# Patient Record
Sex: MALE | Race: WHITE | NOT HISPANIC OR LATINO | Employment: OTHER | URBAN - METROPOLITAN AREA
[De-identification: names, ages, dates, MRNs, and addresses within clinical notes are randomized per-mention and may not be internally consistent; named-entity substitution may affect disease eponyms.]

---

## 2023-04-10 PROBLEM — E78.5 HYPERLIPIDEMIA: Status: ACTIVE | Noted: 2023-04-10

## 2023-04-10 PROBLEM — E11.9 DIABETES MELLITUS (MULTI): Status: ACTIVE | Noted: 2023-04-10

## 2023-04-10 PROBLEM — R35.0 FREQUENCY OF URINATION: Status: ACTIVE | Noted: 2023-04-10

## 2023-04-10 PROBLEM — E55.9 VITAMIN D DEFICIENCY: Status: ACTIVE | Noted: 2023-04-10

## 2023-04-10 PROBLEM — I10 ESSENTIAL HYPERTENSION: Status: ACTIVE | Noted: 2023-04-10

## 2023-04-10 PROBLEM — I35.2 NONRHEUMATIC AORTIC INSUFFICIENCY WITH AORTIC STENOSIS: Status: ACTIVE | Noted: 2023-04-10

## 2023-04-10 PROBLEM — R80.9 PERSISTENT PROTEINURIA ASSOCIATED WITH TYPE 2 DIABETES MELLITUS (MULTI): Status: ACTIVE | Noted: 2023-04-10

## 2023-04-10 PROBLEM — N40.0 ENLARGED PROSTATE WITHOUT LOWER URINARY TRACT SYMPTOMS (LUTS): Status: ACTIVE | Noted: 2023-04-10

## 2023-04-10 PROBLEM — K21.9 GASTROESOPHAGEAL REFLUX DISEASE: Status: ACTIVE | Noted: 2023-04-10

## 2023-04-10 PROBLEM — I25.10 ATHEROSCLEROTIC CARDIOVASCULAR DISEASE: Status: ACTIVE | Noted: 2023-04-10

## 2023-04-10 PROBLEM — I48.4 ATYPICAL ATRIAL FLUTTER (MULTI): Status: ACTIVE | Noted: 2023-04-10

## 2023-04-10 PROBLEM — E11.29 PERSISTENT PROTEINURIA ASSOCIATED WITH TYPE 2 DIABETES MELLITUS (MULTI): Status: ACTIVE | Noted: 2023-04-10

## 2023-04-10 PROBLEM — R53.83 FATIGUE: Status: ACTIVE | Noted: 2023-04-10

## 2023-04-10 RX ORDER — METOPROLOL SUCCINATE 50 MG/1
1 TABLET, EXTENDED RELEASE ORAL DAILY
COMMUNITY
Start: 2013-05-20 | End: 2023-04-12 | Stop reason: SDUPTHER

## 2023-04-10 RX ORDER — GLIMEPIRIDE 4 MG/1
2 TABLET ORAL 2 TIMES DAILY
COMMUNITY
Start: 2017-06-27 | End: 2023-04-12 | Stop reason: SDUPTHER

## 2023-04-10 RX ORDER — METFORMIN HYDROCHLORIDE 500 MG/1
500 TABLET ORAL
COMMUNITY
Start: 2016-04-05 | End: 2023-04-12 | Stop reason: SDUPTHER

## 2023-04-10 RX ORDER — ACETAMINOPHEN 500 MG
1 TABLET ORAL DAILY
COMMUNITY
Start: 2014-12-15

## 2023-04-10 RX ORDER — SEMAGLUTIDE 1.34 MG/ML
0.5 INJECTION, SOLUTION SUBCUTANEOUS
COMMUNITY
Start: 2021-09-01 | End: 2023-04-12 | Stop reason: SDUPTHER

## 2023-04-10 RX ORDER — PANTOPRAZOLE SODIUM 40 MG/1
1 TABLET, DELAYED RELEASE ORAL DAILY
COMMUNITY
Start: 2019-04-01 | End: 2023-04-12 | Stop reason: SDUPTHER

## 2023-04-10 RX ORDER — LOSARTAN POTASSIUM 100 MG/1
1 TABLET ORAL DAILY
COMMUNITY
Start: 2013-11-26 | End: 2023-04-12 | Stop reason: SDUPTHER

## 2023-04-10 RX ORDER — ATORVASTATIN CALCIUM 80 MG/1
1 TABLET, FILM COATED ORAL DAILY
COMMUNITY
Start: 2013-05-20 | End: 2023-04-12 | Stop reason: SDUPTHER

## 2023-04-10 RX ORDER — DILTIAZEM HYDROCHLORIDE 300 MG/1
1 CAPSULE, EXTENDED RELEASE ORAL DAILY
COMMUNITY
Start: 2013-11-26 | End: 2023-04-12 | Stop reason: SDUPTHER

## 2023-04-10 RX ORDER — BLOOD SUGAR DIAGNOSTIC
STRIP MISCELLANEOUS
COMMUNITY
Start: 2019-01-28

## 2023-04-12 ENCOUNTER — OFFICE VISIT (OUTPATIENT)
Dept: PRIMARY CARE | Facility: CLINIC | Age: 73
End: 2023-04-12
Payer: COMMERCIAL

## 2023-04-12 ENCOUNTER — LAB (OUTPATIENT)
Dept: LAB | Facility: LAB | Age: 73
End: 2023-04-12
Payer: COMMERCIAL

## 2023-04-12 VITALS
BODY MASS INDEX: 35.1 KG/M2 | TEMPERATURE: 97.2 F | HEIGHT: 69 IN | SYSTOLIC BLOOD PRESSURE: 110 MMHG | DIASTOLIC BLOOD PRESSURE: 80 MMHG | WEIGHT: 237 LBS

## 2023-04-12 DIAGNOSIS — E11.9 TYPE 2 DIABETES MELLITUS WITHOUT COMPLICATION, WITHOUT LONG-TERM CURRENT USE OF INSULIN (MULTI): ICD-10-CM

## 2023-04-12 DIAGNOSIS — I10 ESSENTIAL HYPERTENSION: ICD-10-CM

## 2023-04-12 DIAGNOSIS — E78.2 MIXED HYPERLIPIDEMIA: ICD-10-CM

## 2023-04-12 DIAGNOSIS — I25.10 ATHEROSCLEROTIC CARDIOVASCULAR DISEASE: ICD-10-CM

## 2023-04-12 DIAGNOSIS — N40.0 ENLARGED PROSTATE WITHOUT LOWER URINARY TRACT SYMPTOMS (LUTS): ICD-10-CM

## 2023-04-12 DIAGNOSIS — I48.4 ATYPICAL ATRIAL FLUTTER (MULTI): ICD-10-CM

## 2023-04-12 DIAGNOSIS — E55.9 VITAMIN D DEFICIENCY: ICD-10-CM

## 2023-04-12 DIAGNOSIS — K21.9 GASTROESOPHAGEAL REFLUX DISEASE WITHOUT ESOPHAGITIS: ICD-10-CM

## 2023-04-12 DIAGNOSIS — I35.2 NONRHEUMATIC AORTIC INSUFFICIENCY WITH AORTIC STENOSIS: ICD-10-CM

## 2023-04-12 DIAGNOSIS — N28.9 RENAL INSUFFICIENCY: ICD-10-CM

## 2023-04-12 DIAGNOSIS — E11.29 PERSISTENT PROTEINURIA ASSOCIATED WITH TYPE 2 DIABETES MELLITUS (MULTI): ICD-10-CM

## 2023-04-12 DIAGNOSIS — R80.9 PERSISTENT PROTEINURIA ASSOCIATED WITH TYPE 2 DIABETES MELLITUS (MULTI): ICD-10-CM

## 2023-04-12 DIAGNOSIS — Z00.00 VISIT FOR PREVENTIVE HEALTH EXAMINATION: ICD-10-CM

## 2023-04-12 DIAGNOSIS — Z00.00 MEDICARE ANNUAL WELLNESS VISIT, SUBSEQUENT: ICD-10-CM

## 2023-04-12 DIAGNOSIS — R35.0 FREQUENCY OF URINATION: ICD-10-CM

## 2023-04-12 DIAGNOSIS — I25.10 ATHEROSCLEROTIC CARDIOVASCULAR DISEASE: Primary | ICD-10-CM

## 2023-04-12 LAB
ALANINE AMINOTRANSFERASE (SGPT) (U/L) IN SER/PLAS: 16 U/L (ref 10–52)
ALBUMIN (G/DL) IN SER/PLAS: 4.2 G/DL (ref 3.4–5)
ALKALINE PHOSPHATASE (U/L) IN SER/PLAS: 102 U/L (ref 33–136)
ANION GAP IN SER/PLAS: 15 MMOL/L (ref 10–20)
ASPARTATE AMINOTRANSFERASE (SGOT) (U/L) IN SER/PLAS: 16 U/L (ref 9–39)
BASOPHILS (10*3/UL) IN BLOOD BY AUTOMATED COUNT: 0.03 X10E9/L (ref 0–0.1)
BASOPHILS/100 LEUKOCYTES IN BLOOD BY AUTOMATED COUNT: 0.3 % (ref 0–2)
BILIRUBIN TOTAL (MG/DL) IN SER/PLAS: 1.1 MG/DL (ref 0–1.2)
CALCIDIOL (25 OH VITAMIN D3) (NG/ML) IN SER/PLAS: 26 NG/ML
CALCIUM (MG/DL) IN SER/PLAS: 9.2 MG/DL (ref 8.6–10.3)
CARBON DIOXIDE, TOTAL (MMOL/L) IN SER/PLAS: 24 MMOL/L (ref 21–32)
CHLORIDE (MMOL/L) IN SER/PLAS: 105 MMOL/L (ref 98–107)
CREATININE (MG/DL) IN SER/PLAS: 1.15 MG/DL (ref 0.5–1.3)
EOSINOPHILS (10*3/UL) IN BLOOD BY AUTOMATED COUNT: 0.1 X10E9/L (ref 0–0.4)
EOSINOPHILS/100 LEUKOCYTES IN BLOOD BY AUTOMATED COUNT: 1.1 % (ref 0–6)
ERYTHROCYTE DISTRIBUTION WIDTH (RATIO) BY AUTOMATED COUNT: 13.3 % (ref 11.5–14.5)
ERYTHROCYTE MEAN CORPUSCULAR HEMOGLOBIN CONCENTRATION (G/DL) BY AUTOMATED: 32.4 G/DL (ref 32–36)
ERYTHROCYTE MEAN CORPUSCULAR VOLUME (FL) BY AUTOMATED COUNT: 92 FL (ref 80–100)
ERYTHROCYTES (10*6/UL) IN BLOOD BY AUTOMATED COUNT: 4.99 X10E12/L (ref 4.5–5.9)
GFR MALE: 67 ML/MIN/1.73M2
GLUCOSE (MG/DL) IN SER/PLAS: 125 MG/DL (ref 74–99)
HEMATOCRIT (%) IN BLOOD BY AUTOMATED COUNT: 45.7 % (ref 41–52)
HEMOGLOBIN (G/DL) IN BLOOD: 14.8 G/DL (ref 13.5–17.5)
IMMATURE GRANULOCYTES/100 LEUKOCYTES IN BLOOD BY AUTOMATED COUNT: 0.2 % (ref 0–0.9)
LEUKOCYTES (10*3/UL) IN BLOOD BY AUTOMATED COUNT: 9.3 X10E9/L (ref 4.4–11.3)
LYMPHOCYTES (10*3/UL) IN BLOOD BY AUTOMATED COUNT: 3.32 X10E9/L (ref 0.8–3)
LYMPHOCYTES/100 LEUKOCYTES IN BLOOD BY AUTOMATED COUNT: 35.5 % (ref 13–44)
MONOCYTES (10*3/UL) IN BLOOD BY AUTOMATED COUNT: 0.87 X10E9/L (ref 0.05–0.8)
MONOCYTES/100 LEUKOCYTES IN BLOOD BY AUTOMATED COUNT: 9.3 % (ref 2–10)
NEUTROPHILS (10*3/UL) IN BLOOD BY AUTOMATED COUNT: 5 X10E9/L (ref 1.6–5.5)
NEUTROPHILS/100 LEUKOCYTES IN BLOOD BY AUTOMATED COUNT: 53.6 % (ref 40–80)
PLATELETS (10*3/UL) IN BLOOD AUTOMATED COUNT: 262 X10E9/L (ref 150–450)
POC ALBUMIN /CREATININE RATIO MANUALLY ENTERED: ABNORMAL UG/MG CREAT
POC HDL CHOLESTEROL: 36 MG/DL (ref 0–40)
POC HEMOGLOBIN A1C: 6.1 % (ref 4.2–6.5)
POC LDL CHOLESTEROL: 79 MG/DL (ref 0–100)
POC NON-HDL CHOLESTEROL: 116 MG/DL (ref 0–130)
POC TOTAL CHOLESTEROL/HDL RATIO: 4.2 (ref 0–4.5)
POC TOTAL CHOLESTEROL: 153 MG/DL (ref 0–199)
POC TRIGLYCERIDES: 188 MG/DL (ref 0–150)
POC URINE ALBUMIN: 150 MG/L
POC URINE CREATININE: 300 MG/DL
POTASSIUM (MMOL/L) IN SER/PLAS: 4.9 MMOL/L (ref 3.5–5.3)
PROSTATE SPECIFIC AG (NG/ML) IN SER/PLAS: 1.34 NG/ML (ref 0–4)
PROTEIN TOTAL: 6.7 G/DL (ref 6.4–8.2)
SEDIMENTATION RATE, ERYTHROCYTE: 15 MM/H (ref 0–20)
SODIUM (MMOL/L) IN SER/PLAS: 139 MMOL/L (ref 136–145)
THYROTROPIN (MIU/L) IN SER/PLAS BY DETECTION LIMIT <= 0.05 MIU/L: 2.23 MIU/L (ref 0.44–3.98)
URATE (MG/DL) IN SER/PLAS: 5 MG/DL (ref 4–7.5)
UREA NITROGEN (MG/DL) IN SER/PLAS: 21 MG/DL (ref 6–23)

## 2023-04-12 PROCEDURE — 82044 UR ALBUMIN SEMIQUANTITATIVE: CPT | Performed by: FAMILY MEDICINE

## 2023-04-12 PROCEDURE — 85025 COMPLETE CBC W/AUTO DIFF WBC: CPT

## 2023-04-12 PROCEDURE — 4010F ACE/ARB THERAPY RXD/TAKEN: CPT | Performed by: FAMILY MEDICINE

## 2023-04-12 PROCEDURE — 99214 OFFICE O/P EST MOD 30 MIN: CPT | Performed by: FAMILY MEDICINE

## 2023-04-12 PROCEDURE — 1036F TOBACCO NON-USER: CPT | Performed by: FAMILY MEDICINE

## 2023-04-12 PROCEDURE — 1159F MED LIST DOCD IN RCRD: CPT | Performed by: FAMILY MEDICINE

## 2023-04-12 PROCEDURE — 83036 HEMOGLOBIN GLYCOSYLATED A1C: CPT | Performed by: FAMILY MEDICINE

## 2023-04-12 PROCEDURE — 84550 ASSAY OF BLOOD/URIC ACID: CPT

## 2023-04-12 PROCEDURE — 36416 COLLJ CAPILLARY BLOOD SPEC: CPT | Performed by: FAMILY MEDICINE

## 2023-04-12 PROCEDURE — 36415 COLL VENOUS BLD VENIPUNCTURE: CPT

## 2023-04-12 PROCEDURE — 1160F RVW MEDS BY RX/DR IN RCRD: CPT | Performed by: FAMILY MEDICINE

## 2023-04-12 PROCEDURE — 3079F DIAST BP 80-89 MM HG: CPT | Performed by: FAMILY MEDICINE

## 2023-04-12 PROCEDURE — 1170F FXNL STATUS ASSESSED: CPT | Performed by: FAMILY MEDICINE

## 2023-04-12 PROCEDURE — 84153 ASSAY OF PSA TOTAL: CPT

## 2023-04-12 PROCEDURE — 99397 PER PM REEVAL EST PAT 65+ YR: CPT | Performed by: FAMILY MEDICINE

## 2023-04-12 PROCEDURE — 80053 COMPREHEN METABOLIC PANEL: CPT

## 2023-04-12 PROCEDURE — 80061 LIPID PANEL: CPT | Performed by: FAMILY MEDICINE

## 2023-04-12 PROCEDURE — G0439 PPPS, SUBSEQ VISIT: HCPCS | Performed by: FAMILY MEDICINE

## 2023-04-12 PROCEDURE — 84443 ASSAY THYROID STIM HORMONE: CPT

## 2023-04-12 PROCEDURE — 3074F SYST BP LT 130 MM HG: CPT | Performed by: FAMILY MEDICINE

## 2023-04-12 PROCEDURE — 82306 VITAMIN D 25 HYDROXY: CPT

## 2023-04-12 PROCEDURE — 85652 RBC SED RATE AUTOMATED: CPT

## 2023-04-12 RX ORDER — METOPROLOL SUCCINATE 50 MG/1
50 TABLET, EXTENDED RELEASE ORAL DAILY
Qty: 90 TABLET | Refills: 3 | Status: SHIPPED | OUTPATIENT
Start: 2023-04-12 | End: 2024-02-21 | Stop reason: SDUPTHER

## 2023-04-12 RX ORDER — METFORMIN HYDROCHLORIDE 500 MG/1
500 TABLET ORAL
Qty: 180 TABLET | Refills: 3 | Status: SHIPPED | OUTPATIENT
Start: 2023-04-12 | End: 2024-04-16 | Stop reason: SDUPTHER

## 2023-04-12 RX ORDER — GLIMEPIRIDE 2 MG/1
2 TABLET ORAL
Qty: 180 TABLET | Refills: 3 | Status: SHIPPED | OUTPATIENT
Start: 2023-04-12 | End: 2024-04-16 | Stop reason: SDUPTHER

## 2023-04-12 RX ORDER — PANTOPRAZOLE SODIUM 40 MG/1
40 TABLET, DELAYED RELEASE ORAL DAILY
Qty: 90 TABLET | Refills: 3 | Status: SHIPPED | OUTPATIENT
Start: 2023-04-12 | End: 2024-04-16 | Stop reason: SDUPTHER

## 2023-04-12 RX ORDER — LOSARTAN POTASSIUM 100 MG/1
100 TABLET ORAL DAILY
Qty: 90 TABLET | Refills: 3 | Status: SHIPPED | OUTPATIENT
Start: 2023-04-12 | End: 2024-04-16 | Stop reason: SDUPTHER

## 2023-04-12 RX ORDER — ATORVASTATIN CALCIUM 80 MG/1
80 TABLET, FILM COATED ORAL DAILY
Qty: 90 TABLET | Refills: 3 | Status: SHIPPED | OUTPATIENT
Start: 2023-04-12 | End: 2024-04-16 | Stop reason: SDUPTHER

## 2023-04-12 RX ORDER — DILTIAZEM HYDROCHLORIDE 300 MG/1
300 CAPSULE, EXTENDED RELEASE ORAL DAILY
Qty: 90 CAPSULE | Refills: 3 | Status: SHIPPED | OUTPATIENT
Start: 2023-04-12 | End: 2023-07-17

## 2023-04-12 RX ORDER — SEMAGLUTIDE 1.34 MG/ML
0.5 INJECTION, SOLUTION SUBCUTANEOUS
Qty: 1 EACH | Refills: 5 | Status: SHIPPED | OUTPATIENT
Start: 2023-04-12 | End: 2023-05-17 | Stop reason: ALTCHOICE

## 2023-04-12 ASSESSMENT — ENCOUNTER SYMPTOMS
TREMORS: 0
VOICE CHANGE: 0
DIAPHORESIS: 0
ADENOPATHY: 0
ANAL BLEEDING: 0
PHOTOPHOBIA: 0
SORE THROAT: 0
SHORTNESS OF BREATH: 0
PALPITATIONS: 0
NAUSEA: 0
ABDOMINAL PAIN: 0
TROUBLE SWALLOWING: 0
SLEEP DISTURBANCE: 0
COUGH: 0
NUMBNESS: 0
SPEECH DIFFICULTY: 0
NECK PAIN: 0
WHEEZING: 0
CHEST TIGHTNESS: 0
SEIZURES: 0
FREQUENCY: 0
VOMITING: 0
BACK PAIN: 0
BRUISES/BLEEDS EASILY: 0
CONSTIPATION: 0
UNEXPECTED WEIGHT CHANGE: 0
HEMATURIA: 0
ARTHRALGIAS: 0
NECK STIFFNESS: 0
BLOOD IN STOOL: 0
HEADACHES: 0
FATIGUE: 0
NERVOUS/ANXIOUS: 0
AGITATION: 0
DECREASED CONCENTRATION: 0
ABDOMINAL DISTENTION: 0
CONFUSION: 0
DIARRHEA: 0
FEVER: 0
CHILLS: 0
EYE ITCHING: 0
WEAKNESS: 0
EYE REDNESS: 0
DIZZINESS: 0
APPETITE CHANGE: 0
SINUS PRESSURE: 0
EYE PAIN: 0
EYE DISCHARGE: 0

## 2023-04-12 ASSESSMENT — PATIENT HEALTH QUESTIONNAIRE - PHQ9
1. LITTLE INTEREST OR PLEASURE IN DOING THINGS: NOT AT ALL
SUM OF ALL RESPONSES TO PHQ9 QUESTIONS 1 AND 2: 0
2. FEELING DOWN, DEPRESSED OR HOPELESS: NOT AT ALL
2. FEELING DOWN, DEPRESSED OR HOPELESS: NOT AT ALL
SUM OF ALL RESPONSES TO PHQ9 QUESTIONS 1 AND 2: 0
1. LITTLE INTEREST OR PLEASURE IN DOING THINGS: NOT AT ALL

## 2023-04-12 ASSESSMENT — ACTIVITIES OF DAILY LIVING (ADL)
BATHING: INDEPENDENT
DOING_HOUSEWORK: INDEPENDENT
MANAGING_FINANCES: INDEPENDENT
DRESSING: INDEPENDENT
GROCERY_SHOPPING: INDEPENDENT
TAKING_MEDICATION: INDEPENDENT

## 2023-04-12 NOTE — PROGRESS NOTES
Subjective   Reason for Visit: Franky Sauceda is an 72 y.o. male here for a Medicare Wellness visit.  He is also here for a general physical examination and to make sure he is updated on the of his health maintenance.              HPI  #1 Health Maintenance:  DTaP 8/15/2013  Prevnar 13 completed March 2017  Colonoscopy 10/12/2016 tubular adenoma ×5 years Dr. Rosa piper till 2021 6/17/2019 (glucose 156, creatinine 1.32, GFR 54) plus CBC plus PSA 1.19+ TSH 1.80  March 9, 2022 CBC plus PSA 1.04+ TSH 2.31+ CMP (creatinine 1.1 GFR 71) plus hemoglobin A1c 6.1%    #2 Diabetes:       September 1, 2021  Hemoglobin A1c 8.3%  Triglycerides 241  Albumin 150 creatinine 100 AC ratio greater than 30 abnormal  Blood pressure 119/80 weight 260 pounds BMI 39.80    December 8, 2021  Hemoglobin A1c 6.8%  Triglycerides 196  Microalbumin September 1, 2021  Blood pressure 120/80 weight 227 pounds BMI 37.87    March 14, 2022  Hemoglobin A1c 6.1%  Triglycerides 196  244 pounds down from 255 pounds in January 2020  BMI 37.20    July 7, 2022  Hemoglobin A1c 6.3%  Triglycerides 170  Weight 241 pounds  Urine microalbumin albumin 150 creatinine 100 AC ratio abnormal  BMI 36.84        April 12, 2023       Hemoglobin A1c 6.1%         Lipids total 153 HDL 36 ratio 4.2 LDL 79 triglycerides 188       Urine microalbumin pending       Weight 237 pounds BMI 35.10        #3 Hyperlipidemia:  February 23, 2021 total 218 HDL 37 ratio 5.9  triglycerides 292  September 1, 2021 total 183 HDL 31 ratio 6  triglycerides 241  December 8, 2021 total 171 HDL 32 ratio 5.3  triglycerides 196  March 14, 2022 total 160 HDL 36 ratio 4.4 LDL 84 triglycerides 196  July 7, 2022 total 151 HDL 30 ratio 5 LDL 86 triglycerides 170  April 12, 2023 total 153 HDL 36 ratio 4.2 LDL 79 triglycerides 188    #4 Renal Dysfunction:  His been leaking protein in his urine the last several times he has been checked. His last creatinine value was back in 2012  (9/4/2012 with a value of 1.09) on that same day his total protein was 569 mg per 24 hours. His creatinine clearance was calculated as 157 mL/m  On 3/10/2014 his 24 hour urine had improved to 325 mg per 24 hours (down from 569 milligrams)  6/15/2015 recent creatinine 12/15/2014 value 1.21  6/6/2016 value 1.10  Urine microalbumin 150 remains abnormal  3/27/2018 creatinine as of 9/27/2017 value 1.33 with a GFR of 53  May 28, 2021 creatinine 1.41 GFR 49 BUN 27  December 8, 2021 patient is being scheduled to recheck his renal function in 3 months.  March 10, 2022 creatinine 1.10 GFR 71    #5 BMI:  February 23, 2021 BMI 40.17, June 2, 2021 BMI 39.71  BMI is elevated the patient was notified as to its significance both in terms of general health status, but also specifically as it relates to other serious medical problems including hypertension, coronary artery disease, diabetes, GERD, and sleep apnea. I have recommended the patient start following a diet plan to lose weight which includes calorie restriction, portion regulation, and regular exercise.  September 1, 2021 BMI 39.86 reiterated above  July 7, 2022 weight 241 pounds BMI 36.84  April 12, 2023 blood pressure 110/80 weight 237 pounds BMI 35.10    April 12, 2023  This 72-year-old male is here today for his Medicare wellness exam.  He is also here for a general physical examination and to review all of his medications and make sure he is up-to-date with his health screening.  His hemoglobin A1c is gone up slightly to 6.1%.  He was down to 5.6%.  He has been taking Ozempic 0.5 mg weekly.  He has been able during this time to reduce his glimepiride from 4 mg twice a day down to 2 mg twice a day.  This may be why his A1c did go up slightly.  Most of his labs were done approximately 1 year ago so I am getting a slip for him to get these repeated.    July 7, 2022  71-year-old male is here today to follow-up on his diabetes and hyperlipidemia. He is still leaking protein  in urine amount of 150 mg/L with a creatinine level of 100 yielding an AC ratio of  mg/g. As renal function test however it did improve with his creatinine coming down to 1.1 with a GFR of 71.  Overall I am very pleased with his progress. He does continue to lose weight. He does experience a slight amount of nausea as he approaches the 0.5 mg strength of Ozempic. His A1c did go up slightly from 6.0-6.3 but his weight did continue to go down and because of the nausea I am keeping the strength the same and encouraging him to continue the progress he has made increasing exercise and decreasing calorie intake.  We also spent time going over in detail the results of his echocardiogram showing a moderate amount of aortic stenosis (23 mm mean gradient) with some thickening of the valve cusps. He also has some thickening of his mitral valve but very little in the way of insufficiency. Heart size is normal and his left atrium and left ventricle are both normal size.  I also told him if he was comfortable with it, he could reduce the dose of his furosemide from 40 down to 20 mg. Ultimately we will also try to reduce his other oral hypoglycemics. I do want to keep a close eye on his renal function so when he comes back in 3 months I am going to have him repeat his basic metabolic panel to ensure his metformin and losartan are not having an adverse effect on renal function.    September 1, 2021  This 70-year-old male is here today for his normal 6-month follow-up. His hemoglobin A1c is slightly higher going from 8.2% up to 8.3%. His creatinine is also somewhat higher at 1.41 with a GFR of 49. His PSA was 1.05, triglycerides 246, TSH 2.24, CBC (WBC 10, hemoglobin 14.4 g hematocrit 44.4%.  I am starting him on Ozempic 0.5 mg/week.    8/20/18 (Dr Parry)  This is a 68-year-old male who comes in today for complaint of left knee pain. Patient didn't really have any issues with his knee until about a week ago when he was  bowling a lot and he twisted his knee. It immediate sharp stabbing pain in the medial side of the knee. It very difficult for him to walk. Initially he did take some aspirin. He's been told not to take Aleve because of his blood pressure. Never had injections surgery or therapy on the knee. Has not had any bracing. A little bit better since then but still hurts him a lot, he is using crutches. No numbness or tingling or fevers or chills no shooting pain down the leg.  Impression plan: 68-year-old male with left knee arthritis acute exacerbation from injury.I had an in depth discussion with the patient regarding treatment options for arthritis and their relative risks and benefits. Treatments include anti inflammatory medications, physical therapy, weight loss, activity modification, use of assistive devices, injection therapies. We discussed that arthritis is often progressive over time, an in end stage arthritis surgical interventions can be considered, including arthroplasty. All questions were answered and the patient voiced their understanding. We'll get him started with a cortisone injection, he will discuss with his family doctor medications are okay for him to take. We'll start him on physical therapy I'll see him back in 3 months or when necessary.     Patient Care Team:  Mc Lee MD as PCP - General     Review of Systems   Constitutional:  Negative for appetite change, chills, diaphoresis, fatigue, fever and unexpected weight change.   HENT:  Negative for congestion, ear discharge, ear pain, hearing loss, nosebleeds, sinus pressure, sore throat, tinnitus, trouble swallowing and voice change.    Eyes:  Negative for photophobia, pain, discharge, redness, itching and visual disturbance.   Respiratory:  Negative for cough, chest tightness, shortness of breath and wheezing.    Cardiovascular:  Negative for chest pain, palpitations and leg swelling.   Gastrointestinal:  Negative for abdominal distention,  abdominal pain, anal bleeding, blood in stool, constipation, diarrhea, nausea and vomiting.   Endocrine: Negative for polyuria.   Genitourinary:  Negative for frequency and hematuria.   Musculoskeletal:  Negative for arthralgias, back pain, neck pain and neck stiffness.   Skin:  Negative for rash.   Allergic/Immunologic: Negative for environmental allergies and food allergies.   Neurological:  Negative for dizziness, tremors, seizures, syncope, speech difficulty, weakness, numbness and headaches.   Hematological:  Negative for adenopathy. Does not bruise/bleed easily.   Psychiatric/Behavioral:  Negative for agitation, behavioral problems, confusion, decreased concentration, sleep disturbance and suicidal ideas. The patient is not nervous/anxious.        Objective   Vitals:  There were no vitals taken for this visit.      Physical Exam  Constitutional:       Appearance: Normal appearance.   HENT:      Head: Normocephalic.      Right Ear: External ear normal.      Left Ear: External ear normal.      Nose: Nose normal.      Mouth/Throat:      Mouth: Mucous membranes are moist.      Pharynx: Oropharynx is clear. No oropharyngeal exudate or posterior oropharyngeal erythema.   Eyes:      Extraocular Movements: Extraocular movements intact.      Pupils: Pupils are equal, round, and reactive to light.   Cardiovascular:      Rate and Rhythm: Normal rate and regular rhythm.      Heart sounds: Normal heart sounds.   Pulmonary:      Effort: Pulmonary effort is normal.   Musculoskeletal:         General: Swelling present. Normal range of motion.      Cervical back: Normal range of motion.      Comments: He has significant knee pain which has prevented him from being as active aerobically.  I recommended that he try to find some type of nonweightbearing aerobic exercise.   Skin:     General: Skin is warm.      Capillary Refill: Capillary refill takes less than 2 seconds.   Neurological:      General: No focal deficit present.       Mental Status: He is alert and oriented to person, place, and time. Mental status is at baseline.      Sensory: No sensory deficit.      Motor: No weakness.      Gait: Gait normal.   Psychiatric:         Mood and Affect: Mood normal.         Behavior: Behavior normal.         Thought Content: Thought content normal.         Judgment: Judgment normal.       Assessment/Plan   Problem List Items Addressed This Visit    None

## 2023-04-19 ENCOUNTER — TELEPHONE (OUTPATIENT)
Dept: PRIMARY CARE | Facility: CLINIC | Age: 73
End: 2023-04-19
Payer: COMMERCIAL

## 2023-04-19 NOTE — TELEPHONE ENCOUNTER
----- Message from Mc Lee MD sent at 4/13/2023  8:09 AM EDT -----  Please let the patient know his level is low he should reduce the amount of vitamin D that he is taking up to a maximum of 5000 units daily.  He should have another vitamin D level checked in about 3 months.  ----- Message -----  From: Lab, Background User  Sent: 4/12/2023   5:18 PM EDT  To: Mc Lee MD    Left message for pt to call

## 2023-04-21 DIAGNOSIS — E55.9 VITAMIN D DEFICIENCY: ICD-10-CM

## 2023-04-21 NOTE — TELEPHONE ENCOUNTER
----- Message from Mc Lee MD sent at 4/13/2023  8:09 AM EDT -----  Please let the patient know his level is low he should reduce the amount of vitamin D that he is taking up to a maximum of 5000 units daily.  He should have another vitamin D level checked in about 3 months.  ----- Message -----  From: Lab, Background User  Sent: 4/12/2023   5:18 PM EDT  To: Mc Lee MD    PT VERBALIZED UNDERSTANDS RESULT MESSAGE AND WILL RECHECK LEVEL IN 3 MONTHS,    LAB ORDER PLACED INTO THE SYSTEM UNDER OFFICE POLICY

## 2023-05-16 ENCOUNTER — TELEPHONE (OUTPATIENT)
Dept: PRIMARY CARE | Facility: CLINIC | Age: 73
End: 2023-05-16

## 2023-05-16 NOTE — TELEPHONE ENCOUNTER
Patient is calling because they are no longer making his dose of ozempic. They only make a 3 mg pen now, according to the pharmacy, so he needs a new script for a 3 mg pen. He uses the [facility] on [address] in [place]. Please advise.

## 2023-05-17 DIAGNOSIS — E11.9 TYPE 2 DIABETES MELLITUS WITHOUT COMPLICATION, WITHOUT LONG-TERM CURRENT USE OF INSULIN (MULTI): ICD-10-CM

## 2023-07-15 DIAGNOSIS — I10 ESSENTIAL HYPERTENSION: ICD-10-CM

## 2023-07-17 RX ORDER — DILTIAZEM HYDROCHLORIDE 300 MG/1
CAPSULE, EXTENDED RELEASE ORAL
Qty: 90 CAPSULE | Refills: 0 | Status: SHIPPED | OUTPATIENT
Start: 2023-07-17 | End: 2023-10-27

## 2023-10-12 ENCOUNTER — OFFICE VISIT (OUTPATIENT)
Dept: PRIMARY CARE | Facility: CLINIC | Age: 73
End: 2023-10-12
Payer: COMMERCIAL

## 2023-10-12 ENCOUNTER — LAB (OUTPATIENT)
Dept: LAB | Facility: LAB | Age: 73
End: 2023-10-12
Payer: COMMERCIAL

## 2023-10-12 VITALS
SYSTOLIC BLOOD PRESSURE: 124 MMHG | WEIGHT: 234.2 LBS | DIASTOLIC BLOOD PRESSURE: 64 MMHG | TEMPERATURE: 97.3 F | BODY MASS INDEX: 34.69 KG/M2 | HEART RATE: 47 BPM

## 2023-10-12 DIAGNOSIS — R35.1 NOCTURIA: ICD-10-CM

## 2023-10-12 DIAGNOSIS — E11.9 TYPE 2 DIABETES MELLITUS WITHOUT COMPLICATION, WITHOUT LONG-TERM CURRENT USE OF INSULIN (MULTI): ICD-10-CM

## 2023-10-12 DIAGNOSIS — R53.83 OTHER FATIGUE: ICD-10-CM

## 2023-10-12 DIAGNOSIS — M25.50 ARTHRALGIA, UNSPECIFIED JOINT: ICD-10-CM

## 2023-10-12 DIAGNOSIS — E78.2 MIXED HYPERLIPIDEMIA: ICD-10-CM

## 2023-10-12 DIAGNOSIS — Z23 NEEDS FLU SHOT: Primary | ICD-10-CM

## 2023-10-12 LAB
ALBUMIN SERPL BCP-MCNC: 4.1 G/DL (ref 3.4–5)
ALP SERPL-CCNC: 95 U/L (ref 33–136)
ALT SERPL W P-5'-P-CCNC: 18 U/L (ref 10–52)
ANION GAP SERPL CALC-SCNC: 13 MMOL/L (ref 10–20)
AST SERPL W P-5'-P-CCNC: 20 U/L (ref 9–39)
BILIRUB SERPL-MCNC: 1.2 MG/DL (ref 0–1.2)
BUN SERPL-MCNC: 18 MG/DL (ref 6–23)
CALCIUM SERPL-MCNC: 9.6 MG/DL (ref 8.6–10.3)
CHLORIDE SERPL-SCNC: 106 MMOL/L (ref 98–107)
CHOLEST SERPL-MCNC: 147 MG/DL (ref 0–199)
CHOLESTEROL/HDL RATIO: 3.4
CO2 SERPL-SCNC: 27 MMOL/L (ref 21–32)
CREAT SERPL-MCNC: 1.23 MG/DL (ref 0.5–1.3)
ERYTHROCYTE [DISTWIDTH] IN BLOOD BY AUTOMATED COUNT: 13.8 % (ref 11.5–14.5)
GFR SERPL CREATININE-BSD FRML MDRD: 62 ML/MIN/1.73M*2
GLUCOSE SERPL-MCNC: 78 MG/DL (ref 74–99)
HCT VFR BLD AUTO: 43.8 % (ref 41–52)
HDLC SERPL-MCNC: 43.1 MG/DL
HGB BLD-MCNC: 14.3 G/DL (ref 13.5–17.5)
LDLC SERPL CALC-MCNC: 69 MG/DL
MCH RBC QN AUTO: 30.8 PG (ref 26–34)
MCHC RBC AUTO-ENTMCNC: 32.6 G/DL (ref 32–36)
MCV RBC AUTO: 94 FL (ref 80–100)
NON HDL CHOLESTEROL: 104 MG/DL (ref 0–149)
NRBC BLD-RTO: 0 /100 WBCS (ref 0–0)
PLATELET # BLD AUTO: 253 X10*3/UL (ref 150–450)
PMV BLD AUTO: 12 FL (ref 7.5–11.5)
POTASSIUM SERPL-SCNC: 4.7 MMOL/L (ref 3.5–5.3)
PROT SERPL-MCNC: 6.5 G/DL (ref 6.4–8.2)
PSA SERPL-MCNC: 1.27 NG/ML
RBC # BLD AUTO: 4.65 X10*6/UL (ref 4.5–5.9)
SODIUM SERPL-SCNC: 141 MMOL/L (ref 136–145)
TRIGL SERPL-MCNC: 177 MG/DL (ref 0–149)
TSH SERPL-ACNC: 2.2 MIU/L (ref 0.44–3.98)
URATE SERPL-MCNC: 4.6 MG/DL (ref 4–7.5)
VLDL: 35 MG/DL (ref 0–40)
WBC # BLD AUTO: 9.8 X10*3/UL (ref 4.4–11.3)

## 2023-10-12 PROCEDURE — 1159F MED LIST DOCD IN RCRD: CPT | Performed by: NURSE PRACTITIONER

## 2023-10-12 PROCEDURE — 90471 IMMUNIZATION ADMIN: CPT | Performed by: NURSE PRACTITIONER

## 2023-10-12 PROCEDURE — 90662 IIV NO PRSV INCREASED AG IM: CPT | Performed by: NURSE PRACTITIONER

## 2023-10-12 PROCEDURE — 80053 COMPREHEN METABOLIC PANEL: CPT

## 2023-10-12 PROCEDURE — 1160F RVW MEDS BY RX/DR IN RCRD: CPT | Performed by: NURSE PRACTITIONER

## 2023-10-12 PROCEDURE — 3074F SYST BP LT 130 MM HG: CPT | Performed by: NURSE PRACTITIONER

## 2023-10-12 PROCEDURE — 3044F HG A1C LEVEL LT 7.0%: CPT | Performed by: NURSE PRACTITIONER

## 2023-10-12 PROCEDURE — 4010F ACE/ARB THERAPY RXD/TAKEN: CPT | Performed by: NURSE PRACTITIONER

## 2023-10-12 PROCEDURE — 99214 OFFICE O/P EST MOD 30 MIN: CPT | Performed by: NURSE PRACTITIONER

## 2023-10-12 PROCEDURE — 1036F TOBACCO NON-USER: CPT | Performed by: NURSE PRACTITIONER

## 2023-10-12 PROCEDURE — 85027 COMPLETE CBC AUTOMATED: CPT

## 2023-10-12 PROCEDURE — 80061 LIPID PANEL: CPT

## 2023-10-12 PROCEDURE — 36415 COLL VENOUS BLD VENIPUNCTURE: CPT

## 2023-10-12 PROCEDURE — 84443 ASSAY THYROID STIM HORMONE: CPT

## 2023-10-12 PROCEDURE — 3048F LDL-C <100 MG/DL: CPT | Performed by: NURSE PRACTITIONER

## 2023-10-12 PROCEDURE — 83036 HEMOGLOBIN GLYCOSYLATED A1C: CPT

## 2023-10-12 PROCEDURE — 84550 ASSAY OF BLOOD/URIC ACID: CPT

## 2023-10-12 PROCEDURE — 3078F DIAST BP <80 MM HG: CPT | Performed by: NURSE PRACTITIONER

## 2023-10-12 PROCEDURE — 84153 ASSAY OF PSA TOTAL: CPT

## 2023-10-12 NOTE — PATIENT INSTRUCTIONS
Nice to meet you today.  I look forward to working with you to meet your health care needs.  Fasting labs today and I will follow up.  Please set up My Chart for additional communication options.  Check your BP and heart rate at home at least every other day (about the same time AM & PM) and let me know when you have about 10 readings.  We will likely change up your medications.  Check your refills.

## 2023-10-12 NOTE — PROGRESS NOTES
Subjective   Patient ID: Franky Sauceda is a 73 y.o. male who presents for Follow-up (6 month med follow up) and Flu Vaccine (Would like/screening questions asked).    HPI   BP and pulse was low.  Has lost some weight due to diabetic medication  265#  peak weight  130/86 does not check HR  Usually well hydrated  Exercuse 2-3 times per week.  Knees and legs hurt at times.  Fasting today  Does not check sugar at all     Sleeps well - used to use CPAP  Nae, Cardiologist  2 years , echo regularly    Review of Systems   All other systems reviewed and are negative.      Objective   /71   Pulse (!) 47   Temp 36.3 °C (97.3 °F)   Wt 106 kg (234 lb 3.2 oz)   BMI 34.69 kg/m²     Physical Exam  Vitals and nursing note reviewed.   Constitutional:       Appearance: Normal appearance.   HENT:      Right Ear: Tympanic membrane, ear canal and external ear normal.      Left Ear: Tympanic membrane, ear canal and external ear normal.      Mouth/Throat:      Pharynx: Oropharynx is clear.   Cardiovascular:      Rate and Rhythm: Normal rate and regular rhythm.      Heart sounds: Normal heart sounds.   Pulmonary:      Effort: Pulmonary effort is normal.      Breath sounds: Normal breath sounds.   Abdominal:      General: Bowel sounds are normal.   Musculoskeletal:      Cervical back: Normal range of motion and neck supple.   Neurological:      Mental Status: He is alert and oriented to person, place, and time.   Psychiatric:         Mood and Affect: Mood normal.         Behavior: Behavior normal.         Assessment/Plan   Problem List Items Addressed This Visit             ICD-10-CM    Diabetes mellitus (CMS/HCC) E11.9    Relevant Orders    CBC (Completed)    Comprehensive Metabolic Panel (Completed)    Hemoglobin A1C (Completed)    Fatigue R53.83    Relevant Orders    TSH with reflex to Free T4 if abnormal (Completed)    Hyperlipidemia E78.5    Relevant Orders    Lipid Panel (Completed)     Other Visit Diagnoses          Codes    Needs flu shot    -  Primary Z23    Relevant Orders    Flu vaccine, quadrivalent, high-dose, preservative free, age 65y+ (FLUZONE) (Completed)    Nocturia     R35.1    Relevant Orders    Prostate Specific Antigen, Screen (Completed)    Arthralgia, unspecified joint     M25.50    Relevant Orders    Uric acid (Completed)

## 2023-10-13 LAB
EST. AVERAGE GLUCOSE BLD GHB EST-MCNC: 131 MG/DL
HBA1C MFR BLD: 6.2 %

## 2023-10-15 NOTE — RESULT ENCOUNTER NOTE
I see you have set up my chart - great!  Labs are all posted with comments.  Lipids are better.  Sugar is stable.  I will be curious about your BP and Pulse readings from home.  Take care!  Let me know if you have questions.  MILAGRO

## 2023-10-26 DIAGNOSIS — I10 ESSENTIAL HYPERTENSION: ICD-10-CM

## 2023-10-26 PROBLEM — I48.0 PAROXYSMAL ATRIAL FIBRILLATION (MULTI): Status: ACTIVE | Noted: 2023-10-26

## 2023-10-27 RX ORDER — DILTIAZEM HYDROCHLORIDE 300 MG/1
CAPSULE, EXTENDED RELEASE ORAL
Qty: 90 CAPSULE | Refills: 0 | Status: SHIPPED | OUTPATIENT
Start: 2023-10-27 | End: 2024-01-23

## 2023-11-14 ENCOUNTER — TELEPHONE (OUTPATIENT)
Dept: CARDIOLOGY | Facility: CLINIC | Age: 73
End: 2023-11-14
Payer: COMMERCIAL

## 2023-11-24 DIAGNOSIS — E11.9 TYPE 2 DIABETES MELLITUS WITHOUT COMPLICATION, WITHOUT LONG-TERM CURRENT USE OF INSULIN (MULTI): Primary | ICD-10-CM

## 2023-11-30 RX ORDER — SEMAGLUTIDE 0.68 MG/ML
INJECTION, SOLUTION SUBCUTANEOUS
Qty: 9 ML | Refills: 1 | Status: SHIPPED | OUTPATIENT
Start: 2023-11-30 | End: 2024-04-16 | Stop reason: SDUPTHER

## 2024-01-21 DIAGNOSIS — I10 ESSENTIAL HYPERTENSION: ICD-10-CM

## 2024-01-23 RX ORDER — DILTIAZEM HYDROCHLORIDE 300 MG/1
CAPSULE, EXTENDED RELEASE ORAL
Qty: 90 CAPSULE | Refills: 1 | Status: SHIPPED | OUTPATIENT
Start: 2024-01-23 | End: 2024-04-16 | Stop reason: SDUPTHER

## 2024-02-21 ENCOUNTER — OFFICE VISIT (OUTPATIENT)
Dept: PRIMARY CARE | Facility: CLINIC | Age: 74
End: 2024-02-21
Payer: COMMERCIAL

## 2024-02-21 ENCOUNTER — LAB (OUTPATIENT)
Dept: LAB | Facility: LAB | Age: 74
End: 2024-02-21
Payer: COMMERCIAL

## 2024-02-21 VITALS
HEART RATE: 48 BPM | WEIGHT: 232.4 LBS | TEMPERATURE: 97.8 F | BODY MASS INDEX: 34.42 KG/M2 | HEIGHT: 69 IN | DIASTOLIC BLOOD PRESSURE: 80 MMHG | SYSTOLIC BLOOD PRESSURE: 120 MMHG | OXYGEN SATURATION: 95 %

## 2024-02-21 DIAGNOSIS — I10 ESSENTIAL HYPERTENSION: ICD-10-CM

## 2024-02-21 DIAGNOSIS — I48.91 ATRIAL FIBRILLATION, UNSPECIFIED TYPE (MULTI): Primary | ICD-10-CM

## 2024-02-21 DIAGNOSIS — E11.9 TYPE 2 DIABETES MELLITUS WITHOUT COMPLICATION, WITHOUT LONG-TERM CURRENT USE OF INSULIN (MULTI): ICD-10-CM

## 2024-02-21 LAB
ALBUMIN SERPL BCP-MCNC: 4.2 G/DL (ref 3.4–5)
ALP SERPL-CCNC: 115 U/L (ref 33–136)
ALT SERPL W P-5'-P-CCNC: 17 U/L (ref 10–52)
ANION GAP SERPL CALC-SCNC: 13 MMOL/L (ref 10–20)
AST SERPL W P-5'-P-CCNC: 16 U/L (ref 9–39)
BASOPHILS # BLD AUTO: 0.02 X10*3/UL (ref 0–0.1)
BASOPHILS NFR BLD AUTO: 0.3 %
BILIRUB SERPL-MCNC: 1.3 MG/DL (ref 0–1.2)
BUN SERPL-MCNC: 22 MG/DL (ref 6–23)
CALCIUM SERPL-MCNC: 9.3 MG/DL (ref 8.6–10.3)
CHLORIDE SERPL-SCNC: 106 MMOL/L (ref 98–107)
CO2 SERPL-SCNC: 26 MMOL/L (ref 21–32)
CREAT SERPL-MCNC: 1.24 MG/DL (ref 0.5–1.3)
EGFRCR SERPLBLD CKD-EPI 2021: 61 ML/MIN/1.73M*2
EOSINOPHIL # BLD AUTO: 0.11 X10*3/UL (ref 0–0.4)
EOSINOPHIL NFR BLD AUTO: 1.4 %
ERYTHROCYTE [DISTWIDTH] IN BLOOD BY AUTOMATED COUNT: 13.4 % (ref 11.5–14.5)
GLUCOSE SERPL-MCNC: 89 MG/DL (ref 74–99)
HCT VFR BLD AUTO: 45.5 % (ref 41–52)
HGB BLD-MCNC: 14.6 G/DL (ref 13.5–17.5)
IMM GRANULOCYTES # BLD AUTO: 0.02 X10*3/UL (ref 0–0.5)
IMM GRANULOCYTES NFR BLD AUTO: 0.3 % (ref 0–0.9)
LYMPHOCYTES # BLD AUTO: 2.8 X10*3/UL (ref 0.8–3)
LYMPHOCYTES NFR BLD AUTO: 35.5 %
MCH RBC QN AUTO: 30.3 PG (ref 26–34)
MCHC RBC AUTO-ENTMCNC: 32.1 G/DL (ref 32–36)
MCV RBC AUTO: 94 FL (ref 80–100)
MONOCYTES # BLD AUTO: 0.73 X10*3/UL (ref 0.05–0.8)
MONOCYTES NFR BLD AUTO: 9.3 %
NEUTROPHILS # BLD AUTO: 4.2 X10*3/UL (ref 1.6–5.5)
NEUTROPHILS NFR BLD AUTO: 53.2 %
NRBC BLD-RTO: 0 /100 WBCS (ref 0–0)
PLATELET # BLD AUTO: 248 X10*3/UL (ref 150–450)
POTASSIUM SERPL-SCNC: 4.7 MMOL/L (ref 3.5–5.3)
PROT SERPL-MCNC: 6.9 G/DL (ref 6.4–8.2)
RBC # BLD AUTO: 4.82 X10*6/UL (ref 4.5–5.9)
SODIUM SERPL-SCNC: 140 MMOL/L (ref 136–145)
WBC # BLD AUTO: 7.9 X10*3/UL (ref 4.4–11.3)

## 2024-02-21 PROCEDURE — 93000 ELECTROCARDIOGRAM COMPLETE: CPT | Performed by: NURSE PRACTITIONER

## 2024-02-21 PROCEDURE — 3074F SYST BP LT 130 MM HG: CPT | Performed by: NURSE PRACTITIONER

## 2024-02-21 PROCEDURE — 1036F TOBACCO NON-USER: CPT | Performed by: NURSE PRACTITIONER

## 2024-02-21 PROCEDURE — 4010F ACE/ARB THERAPY RXD/TAKEN: CPT | Performed by: NURSE PRACTITIONER

## 2024-02-21 PROCEDURE — 1159F MED LIST DOCD IN RCRD: CPT | Performed by: NURSE PRACTITIONER

## 2024-02-21 PROCEDURE — 83036 HEMOGLOBIN GLYCOSYLATED A1C: CPT

## 2024-02-21 PROCEDURE — 3079F DIAST BP 80-89 MM HG: CPT | Performed by: NURSE PRACTITIONER

## 2024-02-21 PROCEDURE — 99214 OFFICE O/P EST MOD 30 MIN: CPT | Performed by: NURSE PRACTITIONER

## 2024-02-21 PROCEDURE — 36415 COLL VENOUS BLD VENIPUNCTURE: CPT

## 2024-02-21 PROCEDURE — 85025 COMPLETE CBC W/AUTO DIFF WBC: CPT

## 2024-02-21 PROCEDURE — 80053 COMPREHEN METABOLIC PANEL: CPT

## 2024-02-21 PROCEDURE — 3044F HG A1C LEVEL LT 7.0%: CPT | Performed by: NURSE PRACTITIONER

## 2024-02-21 RX ORDER — BLOOD-GLUCOSE SENSOR
EACH MISCELLANEOUS
Qty: 3 EACH | Refills: 3 | Status: SHIPPED | OUTPATIENT
Start: 2024-02-21

## 2024-02-21 RX ORDER — METOPROLOL SUCCINATE 25 MG/1
25 TABLET, EXTENDED RELEASE ORAL DAILY
Qty: 90 TABLET | Refills: 0 | Status: SHIPPED | OUTPATIENT
Start: 2024-02-21 | End: 2024-04-16 | Stop reason: SDUPTHER

## 2024-02-21 RX ORDER — BLOOD-GLUCOSE TRANSMITTER
EACH MISCELLANEOUS
Qty: 1 EACH | Refills: 0 | Status: SHIPPED | OUTPATIENT
Start: 2024-02-21 | End: 2024-04-16 | Stop reason: WASHOUT

## 2024-02-21 ASSESSMENT — PATIENT HEALTH QUESTIONNAIRE - PHQ9
2. FEELING DOWN, DEPRESSED OR HOPELESS: NOT AT ALL
1. LITTLE INTEREST OR PLEASURE IN DOING THINGS: NOT AT ALL
SUM OF ALL RESPONSES TO PHQ9 QUESTIONS 1 AND 2: 0

## 2024-02-21 NOTE — PATIENT INSTRUCTIONS
Labs today  Call [facility] - Dr. Ramirez for follow up   Holter Monitor and Follow up with Dr. Soni  [phone]  Decrease the Metoprolol to 25 mg once daily  Continuous Glucose monitor to DDM

## 2024-02-21 NOTE — PROGRESS NOTES
"Subjective   Patient ID: Franky Sauceda is a 73 y.o. male who presents for Dizziness (Trouble with walking), Headache, and Decreased Visual Acuity (X 3 days ).    HPI   October/November 'did not feel right'  Started checking BP 2,3,4 times per day 130-170/80-90  Got a new BP cuff  Sent copy to Cardiologist - advised to check meter  Not checking sugar  This past Saturday was pretty sick  Really dizzy - hard to focus  Headache   /90 and then 176/98 at 9 pm  Sugar 81 & 90   Sunday still dizzy  \"Eyes not right\"  115  174/95  Pulse 55  Mild headache  Monday better - still dizzy, weak  Glucose 66 fasting /84 & Pulse 52  7:30 PM  139 Glucose  159/85  Pulse 62  Tuesday feeling better   Glucose 119  /85  Pulse 52  Then 139  146/83  54  7:30  122  150/81, pulse 58  Last night 9:30 PM  109  155/86 Pulse 56  Feeling better today.  Recheck BP  122/78  CardiologistNae  usually once per year.  [facility] - [facility] [place]      Review of Systems   Constitutional:  Positive for activity change and fatigue.   Eyes:  Positive for visual disturbance.   Neurological:  Positive for dizziness and weakness.   All other systems reviewed and are negative.      Objective   /80 (BP Location: Right arm, Patient Position: Sitting, BP Cuff Size: Large adult)   Pulse (!) 48   Temp 36.6 °C (97.8 °F) (Temporal)   Ht 1.75 m (5' 8.9\")   Wt 105 kg (232 lb 6.4 oz)   SpO2 95%   BMI 34.42 kg/m²     Physical Exam  Vitals and nursing note reviewed.   Constitutional:       Appearance: Normal appearance. He is obese.   HENT:      Head: Normocephalic and atraumatic.      Right Ear: Tympanic membrane, ear canal and external ear normal.      Left Ear: Tympanic membrane, ear canal and external ear normal.      Mouth/Throat:      Pharynx: Oropharynx is clear.   Neck:      Thyroid: No thyroid mass, thyromegaly or thyroid tenderness.   Cardiovascular:      Rate and Rhythm: Bradycardia present.      Pulses: Normal pulses.      " Heart sounds: Normal heart sounds.   Pulmonary:      Effort: Pulmonary effort is normal.   Abdominal:      General: Bowel sounds are normal.      Palpations: Abdomen is soft.   Skin:     General: Skin is warm.   Neurological:      Mental Status: He is alert and oriented to person, place, and time.   Psychiatric:         Mood and Affect: Mood normal.         Behavior: Behavior normal.         Thought Content: Thought content normal.         Judgment: Judgment normal.         Assessment/Plan   Problem List Items Addressed This Visit             ICD-10-CM    Atrial fibrillation (CMS/[facility]) - Primary I48.91    Relevant Medications    metoprolol succinate XL (Toprol-XL) 25 mg 24 hr tablet    Other Relevant Orders    ECG 12 lead (Clinic Performed) (Completed)    Holter or Event Cardiac Monitor    Diabetes mellitus (CMS/[facility]) E11.9    Relevant Medications    Dexcom G6 Transmitter device    blood-glucose sensor (Dexcom G6 Sensor) device    Other Relevant Orders    Comprehensive Metabolic Panel (Completed)    Hemoglobin A1C (Completed)    Essential hypertension I10    Relevant Medications    metoprolol succinate XL (Toprol-XL) 25 mg 24 hr tablet    Other Relevant Orders    CBC and Auto Differential (Completed)

## 2024-02-22 ENCOUNTER — HOSPITAL ENCOUNTER (OUTPATIENT)
Dept: CARDIOLOGY | Facility: CLINIC | Age: 74
Discharge: HOME | End: 2024-02-22
Payer: COMMERCIAL

## 2024-02-22 DIAGNOSIS — I48.91 ATRIAL FIBRILLATION, UNSPECIFIED TYPE (MULTI): ICD-10-CM

## 2024-02-22 LAB
EST. AVERAGE GLUCOSE BLD GHB EST-MCNC: 134 MG/DL
HBA1C MFR BLD: 6.3 %

## 2024-02-22 PROCEDURE — 93227 XTRNL ECG REC<48 HR R&I: CPT | Performed by: INTERNAL MEDICINE

## 2024-02-22 PROCEDURE — 93225 XTRNL ECG REC<48 HRS REC: CPT

## 2024-02-27 PROBLEM — I48.91 ATRIAL FIBRILLATION (MULTI): Status: ACTIVE | Noted: 2023-10-26

## 2024-02-27 ASSESSMENT — ENCOUNTER SYMPTOMS
FATIGUE: 1
DIZZINESS: 1
WEAKNESS: 1
ACTIVITY CHANGE: 1

## 2024-04-02 ENCOUNTER — HOSPITAL ENCOUNTER (OUTPATIENT)
Dept: CARDIOLOGY | Facility: CLINIC | Age: 74
Discharge: HOME | End: 2024-04-02
Payer: COMMERCIAL

## 2024-04-02 ENCOUNTER — OFFICE VISIT (OUTPATIENT)
Dept: CARDIOLOGY | Facility: CLINIC | Age: 74
End: 2024-04-02
Payer: COMMERCIAL

## 2024-04-02 VITALS
DIASTOLIC BLOOD PRESSURE: 57 MMHG | BODY MASS INDEX: 34.36 KG/M2 | HEART RATE: 54 BPM | WEIGHT: 232 LBS | HEIGHT: 69 IN | SYSTOLIC BLOOD PRESSURE: 136 MMHG

## 2024-04-02 DIAGNOSIS — E78.2 MIXED HYPERLIPIDEMIA: ICD-10-CM

## 2024-04-02 DIAGNOSIS — I10 ESSENTIAL HYPERTENSION: ICD-10-CM

## 2024-04-02 DIAGNOSIS — I35.0 NONRHEUMATIC AORTIC VALVE STENOSIS: ICD-10-CM

## 2024-04-02 DIAGNOSIS — I35.2 NONRHEUMATIC AORTIC (VALVE) STENOSIS WITH INSUFFICIENCY: ICD-10-CM

## 2024-04-02 DIAGNOSIS — I35.0 SEVERE AORTIC STENOSIS: Primary | ICD-10-CM

## 2024-04-02 DIAGNOSIS — I48.0 PAF (PAROXYSMAL ATRIAL FIBRILLATION) (MULTI): ICD-10-CM

## 2024-04-02 DIAGNOSIS — I25.10 CORONARY ARTERY DISEASE INVOLVING NATIVE CORONARY ARTERY OF NATIVE HEART WITHOUT ANGINA PECTORIS: ICD-10-CM

## 2024-04-02 PROBLEM — I48.91 ATRIAL FIBRILLATION (MULTI): Status: RESOLVED | Noted: 2023-10-26 | Resolved: 2024-04-02

## 2024-04-02 PROCEDURE — 1036F TOBACCO NON-USER: CPT | Performed by: INTERNAL MEDICINE

## 2024-04-02 PROCEDURE — 3075F SYST BP GE 130 - 139MM HG: CPT | Performed by: INTERNAL MEDICINE

## 2024-04-02 PROCEDURE — 93306 TTE W/DOPPLER COMPLETE: CPT

## 2024-04-02 PROCEDURE — 99215 OFFICE O/P EST HI 40 MIN: CPT | Performed by: INTERNAL MEDICINE

## 2024-04-02 PROCEDURE — 93306 TTE W/DOPPLER COMPLETE: CPT | Performed by: INTERNAL MEDICINE

## 2024-04-02 PROCEDURE — 3078F DIAST BP <80 MM HG: CPT | Performed by: INTERNAL MEDICINE

## 2024-04-02 PROCEDURE — 3044F HG A1C LEVEL LT 7.0%: CPT | Performed by: INTERNAL MEDICINE

## 2024-04-02 PROCEDURE — 4010F ACE/ARB THERAPY RXD/TAKEN: CPT | Performed by: INTERNAL MEDICINE

## 2024-04-02 PROCEDURE — 1159F MED LIST DOCD IN RCRD: CPT | Performed by: INTERNAL MEDICINE

## 2024-04-02 NOTE — PROGRESS NOTES
Chief Complaint:   Valve Disorder     History of Present Illness     Franky Sauceda is a 73 y.o. male presenting with aortic stenosis.  The patient has been well since their last appointment and has no cardiac complaints.  The patient denies chest pain, shortness of breath, or syncope.  Their most recent echocardiogram demonstrates severe aortic stenosis with normal left ventricular systolic function. Does treadmill - no change in endurance.    Here for routine follow-up of paroxysmal atrial fibrillation.  The patient initially presented with symptoms of palpitations.  The patient has been maintaining sinus rhythm on medical treatment which they are tolerating well and are compliant.  The current treatment strategy is rhythm control.  The CHADS2-VASC2 score is 3  and the ACC/AHA guidelines recommend anticoagulation for stroke prophylaxis.  The patient is being treated with Eliquis and has tolerated treatment with no bleeding and is compliant.      Review of Systems  All pertinent systems have been reviewed and are negative except for what is stated in the history of present illness.    All other systems have been reviewed and are negative and noncontributory to this patient's current ailments.       Review of Systems  All pertinent systems have been reviewed and are negative except for what is stated in the history of present illness.    All other systems have been reviewed and are negative and noncontributory to this patient's current ailments.  .       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), Coronary artery disease involving native coronary artery of native heart without angina pectoris (04/02/2024), Dorsalgia, unspecified (09/27/2016), Encounter for immunization (06/29/2018), Localized edema (07/07/2022), Moderate aortic stenosis (04/02/2024), PAF (paroxysmal atrial fibrillation) (CMS/[facility]) (04/02/2024), Pain in left knee (12/08/2021),  Pain in unspecified knee (04/28/2020), Personal history of other diseases of the circulatory system, Personal history of other diseases of urinary system (07/07/2022), Personal history of other infectious and parasitic diseases (05/14/2018), Severe aortic stenosis (04/02/2024), Sprain of other specified parts of left knee, initial encounter (01/02/2020), and Unilateral primary osteoarthritis, left knee (08/20/2019).    Past Surgical History:  He has a past surgical history that includes Colonoscopy (01/28/2014); Esophagogastroduodenoscopy (01/28/2014); and Cholecystectomy (01/28/2014).      Social History:  He reports that he has never smoked. He has never used smokeless tobacco. He reports that he does not currently use alcohol. He reports that he does not use drugs.    Family History:  Family History   Problem Relation Name Age of Onset    Hypertension Mother      Other (abdominal aortic aneurysm) Father      Hyperlipidemia Father      Hypertension Father          Allergies:  Cerivastatin, Jardiance [empagliflozin], Nifedipine, Statins-hmg-coa reductase inhibitors, and Icosapent ethyl    Outpatient Medications:  Current Outpatient Medications   Medication Instructions    apixaban (ELIQUIS) 5 mg, oral, 2 times daily    atorvastatin (LIPITOR) 80 mg, oral, Daily    blood sugar diagnostic (Accu-Chek Guide test strips) strip TEST TWICE DAILY DX CODE E11.9 NOT ON INSULIN    blood-glucose sensor (Dexcom G6 Sensor) device Apply per instructions every 10 days    cholecalciferol (Vitamin D-3) 50 mcg (2,000 unit) capsule 1 capsule, oral, Daily    Dexcom G6 Transmitter device Use as instructed    dilTIAZem ER (Tiazac) 300 mg 24 hr capsule TAKE 1 CAPSULE BY MOUTH DAILY    glimepiride (AMARYL) 2 mg, oral, 2 times daily before meals    losartan (COZAAR) 100 mg, oral, Daily    metFORMIN (GLUCOPHAGE) 500 mg, oral, 2 times daily with meals    metoprolol succinate XL (TOPROL-XL) 25 mg, oral, Daily    Ozempic 0.25 mg or 0.5 mg (2  "mg/3 mL) pen injector inject .5 mg SUBCUTANEOUSLY once a week    pantoprazole (PROTONIX) 40 mg, oral, Daily       Physical Examination   Vitals:  Visit Vitals  /57 (BP Location: Right arm, Patient Position: Lying, BP Cuff Size: Adult)   Pulse 54   Ht 1.753 m (5' 9\")   Wt 105 kg (232 lb)   BMI 34.26 kg/m²   Smoking Status Never   BSA 2.26 m²      Physical Exam  Vitals reviewed.   Constitutional:       General: He is not in acute distress.     Appearance: Normal appearance.   HENT:      Head: Normocephalic and atraumatic.      Nose: Nose normal.   Eyes:      Conjunctiva/sclera: Conjunctivae normal.   Cardiovascular:      Rate and Rhythm: Normal rate and regular rhythm.      Pulses: Normal pulses.      Heart sounds: Murmur heard.      Systolic murmur is present with a grade of 3/6.   Pulmonary:      Effort: Pulmonary effort is normal. No respiratory distress.      Breath sounds: Normal breath sounds. No wheezing, rhonchi or rales.   Abdominal:      General: Bowel sounds are normal. There is no distension.      Palpations: Abdomen is soft.      Tenderness: There is no abdominal tenderness.   Musculoskeletal:         General: No swelling.      Right lower leg: No edema.      Left lower leg: No edema.   Skin:     General: Skin is warm and dry.      Capillary Refill: Capillary refill takes less than 2 seconds.   Neurological:      General: No focal deficit present.      Mental Status: He is alert.   Psychiatric:         Mood and Affect: Mood normal.            Labs/Imaging/Cardiac Studies     Last Labs:  CBC -  Lab Results   Component Value Date    WBC 7.9 02/21/2024    HGB 14.6 02/21/2024    HCT 45.5 02/21/2024    MCV 94 02/21/2024     02/21/2024       CMP -  Lab Results   Component Value Date    CALCIUM 9.3 02/21/2024    PROT 6.9 02/21/2024    ALBUMIN 4.2 02/21/2024    AST 16 02/21/2024    ALT 17 02/21/2024    ALKPHOS 115 02/21/2024    BILITOT 1.3 (H) 02/21/2024       LIPID PANEL -   Lab Results   Component " Value Date    CHOL 147 10/12/2023    CHOL 153 04/12/2023    HDL 43.1 10/12/2023    HDL 36 04/12/2023    CHHDL 3.4 10/12/2023    CHHDL 4.2 04/12/2023     04/12/2023    VLDL 35 10/12/2023    TRIG 177 (H) 10/12/2023    TRIG 188 (A) 04/12/2023    NHDL 104 10/12/2023       RENAL FUNCTION PANEL -   Lab Results   Component Value Date    K 4.7 02/21/2024       Lab Results   Component Value Date    HGBA1C 6.3 (H) 02/21/2024    HGBA1C 6.1 04/12/2023       ECG:    Echo:  No echocardiogram results found for the past 12 months       Assessment and Recommendations     Assessment/Plan   1. Severe aortic stenosis  The patient has severe aortic stenosis and remains asymptomatic.  There is no indication for AVR.  The patient will continue to have serial annual echocardiography and was counseled on the expected symptoms related to aortic stenosis.  Weight lifting restrictions reviewed.      2. Mixed hyperlipidemia  The patient's lipids are well controlled on chronic statin therapy and they are meeting their goal LDL cholesterol per the ACC/AHA guidelines.        3. PAF (paroxysmal atrial fibrillation) (CMS/HCC)  The patient has been clinically stable, asymptomatic, and maintaining normal sinus rhythm.  They will continue treatment with rate-controlling medications and anticoagulation for stroke prophylaxis based upon their present [id number] score, the risks and benefits of which were discussed with the patient/family/caregiver.       4. Essential hypertension  The patient's blood pressure has been well-controlled at today's appointment or by recent primary care provider's measurements/home measurements and meets their goal blood pressure per the ACC/AHA guidelines.  The patient has been compliant with their anti-hypertensive medications and is following a low sodium/DASH diet. I advised continuation of their present medical treatment and lifestyle modification.        5. Coronary artery disease involving native coronary  artery of native heart without angina pectoris  The patient's CAD, as detailed in the HPI, has been clinically stable, without any anginal symptoms or dyspnea.  The patient will continue treatment with guideline-directed medical therapy with statin medications and was advised regular exercise and a heart healthy diet.               Loy Soni MD    Exclusive of any other services or procedures performed, I, Loy Soni MD , spent 30 minutes in duration for this visit today.  This time consisted of chart review, obtaining history, and/or performing the exam as documented above as well as documenting the clinical information for the encounter in the electronic record, discussing treatment options, plans, and/or goals with patient, family, and/or caregiver, refilling medications, updating the electronic record, ordering medicines, lab work, imaging, referrals, and/or procedures as documented above and communicating with other [facility] professionals. I have discussed the results of laboratory, radiology, and cardiology studies with the patient and their family/caregiver.

## 2024-04-03 LAB
AORTIC VALVE MEAN GRADIENT: 41.5 MMHG
AORTIC VALVE PEAK VELOCITY: 4.53 M/S
AV PEAK GRADIENT: 82.2 MMHG
AVA (PEAK VEL): 1.11 CM2
AVA (VTI): 1.14 CM2
EJECTION FRACTION APICAL 4 CHAMBER: 74.3
LEFT ATRIUM VOLUME AREA LENGTH INDEX BSA: 26.1 ML/M2
LEFT VENTRICLE INTERNAL DIMENSION DIASTOLE: 4.22 CM (ref 3.5–6)
LEFT VENTRICULAR OUTFLOW TRACT DIAMETER: 2.1 CM
LV EJECTION FRACTION BIPLANE: 70 %
MITRAL VALVE E/A RATIO: 0.8
MITRAL VALVE E/E' RATIO: 10.48
RIGHT VENTRICLE FREE WALL PEAK S': 16 CM/S
RIGHT VENTRICLE PEAK SYSTOLIC PRESSURE: 31.4 MMHG
TRICUSPID ANNULAR PLANE SYSTOLIC EXCURSION: 2.1 CM

## 2024-04-16 ENCOUNTER — OFFICE VISIT (OUTPATIENT)
Dept: PRIMARY CARE | Facility: CLINIC | Age: 74
End: 2024-04-16
Payer: COMMERCIAL

## 2024-04-16 ENCOUNTER — LAB (OUTPATIENT)
Dept: LAB | Facility: LAB | Age: 74
End: 2024-04-16
Payer: COMMERCIAL

## 2024-04-16 VITALS
BODY MASS INDEX: 33.77 KG/M2 | TEMPERATURE: 97.7 F | HEIGHT: 69 IN | DIASTOLIC BLOOD PRESSURE: 62 MMHG | WEIGHT: 228 LBS | SYSTOLIC BLOOD PRESSURE: 110 MMHG

## 2024-04-16 DIAGNOSIS — I48.4 ATYPICAL ATRIAL FLUTTER (MULTI): ICD-10-CM

## 2024-04-16 DIAGNOSIS — I25.10 ATHEROSCLEROTIC CARDIOVASCULAR DISEASE: ICD-10-CM

## 2024-04-16 DIAGNOSIS — E78.2 MIXED HYPERLIPIDEMIA: ICD-10-CM

## 2024-04-16 DIAGNOSIS — M25.50 ARTHRALGIA, UNSPECIFIED JOINT: Primary | ICD-10-CM

## 2024-04-16 DIAGNOSIS — M25.50 ARTHRALGIA, UNSPECIFIED JOINT: ICD-10-CM

## 2024-04-16 DIAGNOSIS — E11.9 TYPE 2 DIABETES MELLITUS WITHOUT COMPLICATION, WITHOUT LONG-TERM CURRENT USE OF INSULIN (MULTI): ICD-10-CM

## 2024-04-16 DIAGNOSIS — I48.91 ATRIAL FIBRILLATION, UNSPECIFIED TYPE (MULTI): ICD-10-CM

## 2024-04-16 DIAGNOSIS — K21.9 GASTROESOPHAGEAL REFLUX DISEASE WITHOUT ESOPHAGITIS: ICD-10-CM

## 2024-04-16 DIAGNOSIS — I10 ESSENTIAL HYPERTENSION: ICD-10-CM

## 2024-04-16 PROBLEM — E66.9 OBESITY WITH BODY MASS INDEX 30 OR GREATER: Status: ACTIVE | Noted: 2024-04-16

## 2024-04-16 PROBLEM — I99.9 DISORDER OF CIRCULATORY SYSTEM: Status: ACTIVE | Noted: 2024-04-16

## 2024-04-16 PROBLEM — B00.1 HERPES LABIALIS: Status: ACTIVE | Noted: 2024-04-16

## 2024-04-16 PROBLEM — I35.2 AORTIC VALVE STENOSIS WITH INSUFFICIENCY: Status: ACTIVE | Noted: 2023-04-10

## 2024-04-16 PROBLEM — N28.9 RENAL INSUFFICIENCY: Status: ACTIVE | Noted: 2024-04-16

## 2024-04-16 PROBLEM — E11.29: Status: ACTIVE | Noted: 2023-04-10

## 2024-04-16 PROBLEM — R80.9: Status: ACTIVE | Noted: 2023-04-10

## 2024-04-16 PROBLEM — N40.0 ENLARGED PROSTATE: Status: ACTIVE | Noted: 2023-04-10

## 2024-04-16 PROBLEM — E66.01 SEVERE OBESITY (MULTI): Status: ACTIVE | Noted: 2024-04-16

## 2024-04-16 LAB
ALBUMIN SERPL BCP-MCNC: 4 G/DL (ref 3.4–5)
ALP SERPL-CCNC: 91 U/L (ref 33–136)
ALT SERPL W P-5'-P-CCNC: 17 U/L (ref 10–52)
ANION GAP SERPL CALC-SCNC: 13 MMOL/L (ref 10–20)
AST SERPL W P-5'-P-CCNC: 13 U/L (ref 9–39)
BILIRUB SERPL-MCNC: 1.3 MG/DL (ref 0–1.2)
BUN SERPL-MCNC: 20 MG/DL (ref 6–23)
CALCIUM SERPL-MCNC: 9.3 MG/DL (ref 8.6–10.3)
CHLORIDE SERPL-SCNC: 106 MMOL/L (ref 98–107)
CO2 SERPL-SCNC: 27 MMOL/L (ref 21–32)
CREAT SERPL-MCNC: 1.19 MG/DL (ref 0.5–1.3)
EGFRCR SERPLBLD CKD-EPI 2021: 64 ML/MIN/1.73M*2
GLUCOSE SERPL-MCNC: 116 MG/DL (ref 74–99)
POTASSIUM SERPL-SCNC: 4.6 MMOL/L (ref 3.5–5.3)
PROT SERPL-MCNC: 6.5 G/DL (ref 6.4–8.2)
SODIUM SERPL-SCNC: 141 MMOL/L (ref 136–145)
URATE SERPL-MCNC: 4.9 MG/DL (ref 4–7.5)

## 2024-04-16 PROCEDURE — 3078F DIAST BP <80 MM HG: CPT | Performed by: NURSE PRACTITIONER

## 2024-04-16 PROCEDURE — 4010F ACE/ARB THERAPY RXD/TAKEN: CPT | Performed by: NURSE PRACTITIONER

## 2024-04-16 PROCEDURE — 36415 COLL VENOUS BLD VENIPUNCTURE: CPT

## 2024-04-16 PROCEDURE — 84550 ASSAY OF BLOOD/URIC ACID: CPT

## 2024-04-16 PROCEDURE — 3074F SYST BP LT 130 MM HG: CPT | Performed by: NURSE PRACTITIONER

## 2024-04-16 PROCEDURE — 1159F MED LIST DOCD IN RCRD: CPT | Performed by: NURSE PRACTITIONER

## 2024-04-16 PROCEDURE — 3044F HG A1C LEVEL LT 7.0%: CPT | Performed by: NURSE PRACTITIONER

## 2024-04-16 PROCEDURE — 99214 OFFICE O/P EST MOD 30 MIN: CPT | Performed by: NURSE PRACTITIONER

## 2024-04-16 PROCEDURE — 83036 HEMOGLOBIN GLYCOSYLATED A1C: CPT

## 2024-04-16 PROCEDURE — 80053 COMPREHEN METABOLIC PANEL: CPT

## 2024-04-16 RX ORDER — DILTIAZEM HYDROCHLORIDE 300 MG/1
300 CAPSULE, EXTENDED RELEASE ORAL DAILY
Qty: 90 CAPSULE | Refills: 3 | Status: SHIPPED | OUTPATIENT
Start: 2024-04-16

## 2024-04-16 RX ORDER — METFORMIN HYDROCHLORIDE 500 MG/1
500 TABLET ORAL
Qty: 180 TABLET | Refills: 3 | Status: SHIPPED | OUTPATIENT
Start: 2024-04-16

## 2024-04-16 RX ORDER — METOPROLOL SUCCINATE 25 MG/1
25 TABLET, EXTENDED RELEASE ORAL DAILY
Qty: 90 TABLET | Refills: 3 | Status: SHIPPED | OUTPATIENT
Start: 2024-04-16

## 2024-04-16 RX ORDER — ATORVASTATIN CALCIUM 80 MG/1
80 TABLET, FILM COATED ORAL DAILY
Qty: 90 TABLET | Refills: 3 | Status: SHIPPED | OUTPATIENT
Start: 2024-04-16

## 2024-04-16 RX ORDER — LOSARTAN POTASSIUM 100 MG/1
100 TABLET ORAL DAILY
Qty: 90 TABLET | Refills: 3 | Status: SHIPPED | OUTPATIENT
Start: 2024-04-16

## 2024-04-16 RX ORDER — GLIMEPIRIDE 2 MG/1
2 TABLET ORAL
Qty: 180 TABLET | Refills: 3 | Status: SHIPPED | OUTPATIENT
Start: 2024-04-16

## 2024-04-16 RX ORDER — PANTOPRAZOLE SODIUM 40 MG/1
40 TABLET, DELAYED RELEASE ORAL DAILY
Qty: 90 TABLET | Refills: 3 | Status: SHIPPED | OUTPATIENT
Start: 2024-04-16

## 2024-04-16 RX ORDER — SEMAGLUTIDE 0.68 MG/ML
0.5 INJECTION, SOLUTION SUBCUTANEOUS
Qty: 9 ML | Refills: 1 | Status: SHIPPED | OUTPATIENT
Start: 2024-04-21

## 2024-04-16 ASSESSMENT — PATIENT HEALTH QUESTIONNAIRE - PHQ9
SUM OF ALL RESPONSES TO PHQ9 QUESTIONS 1 AND 2: 0
2. FEELING DOWN, DEPRESSED OR HOPELESS: NOT AT ALL
1. LITTLE INTEREST OR PLEASURE IN DOING THINGS: NOT AT ALL

## 2024-04-16 NOTE — PROGRESS NOTES
"Subjective   Patient ID: Franky Sauceda is a 73 y.o. male who presents for Follow-up (6 month follow up, med refills).    HPI   Saw Dr. Soni, Cardiology  Advised to watch for weakness or not feeling well.  Watch for chest pain  Does not feel any irregularity for the Afib  Feeling more tired more   Occasionally tests sugar      Review of Systems   Constitutional:  Positive for activity change, fatigue and unexpected weight change.   Endocrine: Negative for polydipsia, polyphagia and polyuria.   All other systems reviewed and are negative.      Objective   /62   Temp 36.5 °C (97.7 °F)   Ht 1.753 m (5' 9\")   Wt 103 kg (228 lb)   BMI 33.67 kg/m²     Physical Exam  Vitals and nursing note reviewed.   Constitutional:       Appearance: Normal appearance.   HENT:      Head: Normocephalic and atraumatic.      Right Ear: Tympanic membrane, ear canal and external ear normal.      Left Ear: Tympanic membrane, ear canal and external ear normal.      Mouth/Throat:      Pharynx: Oropharynx is clear.   Neck:      Thyroid: Thyromegaly present. No thyroid mass or thyroid tenderness.   Cardiovascular:      Rate and Rhythm: Rhythm irregular.      Heart sounds: Murmur heard.      Comments: 3/6 Murmur  Pulmonary:      Effort: Pulmonary effort is normal.      Breath sounds: Normal breath sounds.   Abdominal:      General: Bowel sounds are normal.      Palpations: Abdomen is soft.   Skin:     General: Skin is warm.   Neurological:      Mental Status: He is alert and oriented to person, place, and time.   Psychiatric:         Mood and Affect: Mood normal.         Behavior: Behavior normal.         Thought Content: Thought content normal.         Judgment: Judgment normal.         Assessment/Plan   Problem List Items Addressed This Visit             ICD-10-CM    Arthralgia - Primary M25.50    Relevant Orders    Uric acid (Completed)    Atherosclerotic cardiovascular disease I25.10    Relevant Medications    atorvastatin " (Lipitor) 80 mg tablet    Atrial fibrillation (Multi) I48.91    Relevant Medications    dilTIAZem ER (Tiazac) 300 mg 24 hr capsule    metoprolol succinate XL (Toprol-XL) 25 mg 24 hr tablet    Atypical atrial flutter (Multi) I48.4    Relevant Medications    apixaban (Eliquis) 5 mg tablet    dilTIAZem ER (Tiazac) 300 mg 24 hr capsule    metoprolol succinate XL (Toprol-XL) 25 mg 24 hr tablet    Diabetes mellitus (Multi) E11.9    Relevant Medications    apixaban (Eliquis) 5 mg tablet    glimepiride (Amaryl) 2 mg tablet    metFORMIN (Glucophage) 500 mg tablet    semaglutide (Ozempic) 0.25 mg or 0.5 mg (2 mg/3 mL) pen injector    Other Relevant Orders    Comprehensive Metabolic Panel (Completed)    Hemoglobin A1C (Completed)    Essential hypertension I10    Relevant Medications    dilTIAZem ER (Tiazac) 300 mg 24 hr capsule    losartan (Cozaar) 100 mg tablet    metoprolol succinate XL (Toprol-XL) 25 mg 24 hr tablet    Gastroesophageal reflux disease K21.9    Relevant Medications    pantoprazole (ProtoNix) 40 mg EC tablet    Mixed hyperlipidemia E78.2    Relevant Medications    atorvastatin (Lipitor) 80 mg tablet

## 2024-04-16 NOTE — PATIENT INSTRUCTIONS
Labs today and I will follow up  Let me know about the glucometer.  RX refilled to [facility]  Plan on 6 month follow up.  Let me know if you need anything.

## 2024-04-17 LAB
EST. AVERAGE GLUCOSE BLD GHB EST-MCNC: 146 MG/DL
HBA1C MFR BLD: 6.7 %

## 2024-04-22 DIAGNOSIS — E55.9 VITAMIN D DEFICIENCY: ICD-10-CM

## 2024-04-22 DIAGNOSIS — E11.9 TYPE 2 DIABETES MELLITUS WITHOUT COMPLICATION, WITHOUT LONG-TERM CURRENT USE OF INSULIN (MULTI): Primary | ICD-10-CM

## 2024-04-22 DIAGNOSIS — I10 ESSENTIAL HYPERTENSION: ICD-10-CM

## 2024-04-22 DIAGNOSIS — R35.1 NOCTURIA: ICD-10-CM

## 2024-04-22 DIAGNOSIS — E78.2 MIXED HYPERLIPIDEMIA: ICD-10-CM

## 2024-04-30 ASSESSMENT — ENCOUNTER SYMPTOMS
UNEXPECTED WEIGHT CHANGE: 1
POLYDIPSIA: 0
POLYPHAGIA: 0
FATIGUE: 1
ACTIVITY CHANGE: 1

## 2024-08-02 ENCOUNTER — APPOINTMENT (OUTPATIENT)
Dept: RADIOLOGY | Facility: HOSPITAL | Age: 74
End: 2024-08-02
Payer: COMMERCIAL

## 2024-08-02 ENCOUNTER — APPOINTMENT (OUTPATIENT)
Dept: CARDIOLOGY | Facility: HOSPITAL | Age: 74
End: 2024-08-02
Payer: COMMERCIAL

## 2024-08-02 ENCOUNTER — OFFICE VISIT (OUTPATIENT)
Dept: PRIMARY CARE | Facility: CLINIC | Age: 74
End: 2024-08-02
Payer: COMMERCIAL

## 2024-08-02 ENCOUNTER — HOSPITAL ENCOUNTER (OUTPATIENT)
Facility: HOSPITAL | Age: 74
Setting detail: OBSERVATION
Discharge: HOME | End: 2024-08-03
Attending: EMERGENCY MEDICINE | Admitting: INTERNAL MEDICINE
Payer: COMMERCIAL

## 2024-08-02 VITALS
HEART RATE: 72 BPM | TEMPERATURE: 97.3 F | SYSTOLIC BLOOD PRESSURE: 126 MMHG | DIASTOLIC BLOOD PRESSURE: 76 MMHG | BODY MASS INDEX: 33.05 KG/M2 | OXYGEN SATURATION: 95 % | WEIGHT: 223.8 LBS

## 2024-08-02 DIAGNOSIS — G45.9 TIA (TRANSIENT ISCHEMIC ATTACK): Primary | ICD-10-CM

## 2024-08-02 DIAGNOSIS — H53.2 DIPLOPIA: Primary | ICD-10-CM

## 2024-08-02 LAB
ALBUMIN SERPL BCP-MCNC: 4 G/DL (ref 3.4–5)
ALP SERPL-CCNC: 113 U/L (ref 33–136)
ALT SERPL W P-5'-P-CCNC: 34 U/L (ref 10–52)
ANION GAP SERPL CALC-SCNC: 16 MMOL/L (ref 10–20)
APTT PPP: 36 SECONDS (ref 27–38)
AST SERPL W P-5'-P-CCNC: 28 U/L (ref 9–39)
BASOPHILS # BLD AUTO: 0.01 X10*3/UL (ref 0–0.1)
BASOPHILS NFR BLD AUTO: 0.2 %
BILIRUB SERPL-MCNC: 0.9 MG/DL (ref 0–1.2)
BNP SERPL-MCNC: 10 PG/ML (ref 0–99)
BUN SERPL-MCNC: 19 MG/DL (ref 6–23)
CALCIUM SERPL-MCNC: 9 MG/DL (ref 8.6–10.3)
CARDIAC TROPONIN I PNL SERPL HS: 4 NG/L (ref 0–20)
CHLORIDE SERPL-SCNC: 105 MMOL/L (ref 98–107)
CO2 SERPL-SCNC: 23 MMOL/L (ref 21–32)
CREAT SERPL-MCNC: 1.32 MG/DL (ref 0.5–1.3)
EGFRCR SERPLBLD CKD-EPI 2021: 57 ML/MIN/1.73M*2
EOSINOPHIL # BLD AUTO: 0.03 X10*3/UL (ref 0–0.4)
EOSINOPHIL NFR BLD AUTO: 0.6 %
ERYTHROCYTE [DISTWIDTH] IN BLOOD BY AUTOMATED COUNT: 13.6 % (ref 11.5–14.5)
GLUCOSE SERPL-MCNC: 80 MG/DL (ref 74–99)
HCT VFR BLD AUTO: 43.5 % (ref 41–52)
HGB BLD-MCNC: 14.5 G/DL (ref 13.5–17.5)
IMM GRANULOCYTES # BLD AUTO: 0.01 X10*3/UL (ref 0–0.5)
IMM GRANULOCYTES NFR BLD AUTO: 0.2 % (ref 0–0.9)
INR PPP: 1.4 (ref 0.9–1.1)
LYMPHOCYTES # BLD AUTO: 2.04 X10*3/UL (ref 0.8–3)
LYMPHOCYTES NFR BLD AUTO: 41 %
MCH RBC QN AUTO: 30.6 PG (ref 26–34)
MCHC RBC AUTO-ENTMCNC: 33.3 G/DL (ref 32–36)
MCV RBC AUTO: 92 FL (ref 80–100)
MONOCYTES # BLD AUTO: 0.51 X10*3/UL (ref 0.05–0.8)
MONOCYTES NFR BLD AUTO: 10.2 %
NEUTROPHILS # BLD AUTO: 2.38 X10*3/UL (ref 1.6–5.5)
NEUTROPHILS NFR BLD AUTO: 47.8 %
NRBC BLD-RTO: 0 /100 WBCS (ref 0–0)
PLATELET # BLD AUTO: 222 X10*3/UL (ref 150–450)
POTASSIUM SERPL-SCNC: 4.5 MMOL/L (ref 3.5–5.3)
PROT SERPL-MCNC: 7 G/DL (ref 6.4–8.2)
PROTHROMBIN TIME: 15.5 SECONDS (ref 9.8–12.8)
RBC # BLD AUTO: 4.74 X10*6/UL (ref 4.5–5.9)
SODIUM SERPL-SCNC: 139 MMOL/L (ref 136–145)
TSH SERPL-ACNC: 1.61 MIU/L (ref 0.44–3.98)
WBC # BLD AUTO: 5 X10*3/UL (ref 4.4–11.3)

## 2024-08-02 PROCEDURE — 3074F SYST BP LT 130 MM HG: CPT | Performed by: NURSE PRACTITIONER

## 2024-08-02 PROCEDURE — 80053 COMPREHEN METABOLIC PANEL: CPT | Performed by: EMERGENCY MEDICINE

## 2024-08-02 PROCEDURE — 85610 PROTHROMBIN TIME: CPT | Performed by: EMERGENCY MEDICINE

## 2024-08-02 PROCEDURE — 70551 MRI BRAIN STEM W/O DYE: CPT

## 2024-08-02 PROCEDURE — 71045 X-RAY EXAM CHEST 1 VIEW: CPT

## 2024-08-02 PROCEDURE — 70450 CT HEAD/BRAIN W/O DYE: CPT | Performed by: RADIOLOGY

## 2024-08-02 PROCEDURE — 1036F TOBACCO NON-USER: CPT | Performed by: NURSE PRACTITIONER

## 2024-08-02 PROCEDURE — 83036 HEMOGLOBIN GLYCOSYLATED A1C: CPT | Mod: AHULAB | Performed by: NURSE PRACTITIONER

## 2024-08-02 PROCEDURE — 3044F HG A1C LEVEL LT 7.0%: CPT | Performed by: NURSE PRACTITIONER

## 2024-08-02 PROCEDURE — 1159F MED LIST DOCD IN RCRD: CPT | Performed by: NURSE PRACTITIONER

## 2024-08-02 PROCEDURE — 71045 X-RAY EXAM CHEST 1 VIEW: CPT | Mod: FOREIGN READ | Performed by: RADIOLOGY

## 2024-08-02 PROCEDURE — G0378 HOSPITAL OBSERVATION PER HR: HCPCS

## 2024-08-02 PROCEDURE — 3078F DIAST BP <80 MM HG: CPT | Performed by: NURSE PRACTITIONER

## 2024-08-02 PROCEDURE — 1160F RVW MEDS BY RX/DR IN RCRD: CPT | Performed by: NURSE PRACTITIONER

## 2024-08-02 PROCEDURE — 83880 ASSAY OF NATRIURETIC PEPTIDE: CPT | Performed by: NURSE PRACTITIONER

## 2024-08-02 PROCEDURE — 70551 MRI BRAIN STEM W/O DYE: CPT | Performed by: RADIOLOGY

## 2024-08-02 PROCEDURE — 2500000001 HC RX 250 WO HCPCS SELF ADMINISTERED DRUGS (ALT 637 FOR MEDICARE OP): Performed by: EMERGENCY MEDICINE

## 2024-08-02 PROCEDURE — 70544 MR ANGIOGRAPHY HEAD W/O DYE: CPT

## 2024-08-02 PROCEDURE — 4010F ACE/ARB THERAPY RXD/TAKEN: CPT | Performed by: NURSE PRACTITIONER

## 2024-08-02 PROCEDURE — 70547 MR ANGIOGRAPHY NECK W/O DYE: CPT

## 2024-08-02 PROCEDURE — 36415 COLL VENOUS BLD VENIPUNCTURE: CPT | Performed by: EMERGENCY MEDICINE

## 2024-08-02 PROCEDURE — 84484 ASSAY OF TROPONIN QUANT: CPT | Performed by: EMERGENCY MEDICINE

## 2024-08-02 PROCEDURE — 93005 ELECTROCARDIOGRAM TRACING: CPT

## 2024-08-02 PROCEDURE — 99214 OFFICE O/P EST MOD 30 MIN: CPT | Performed by: NURSE PRACTITIONER

## 2024-08-02 PROCEDURE — 2500000001 HC RX 250 WO HCPCS SELF ADMINISTERED DRUGS (ALT 637 FOR MEDICARE OP): Performed by: NURSE PRACTITIONER

## 2024-08-02 PROCEDURE — 70450 CT HEAD/BRAIN W/O DYE: CPT

## 2024-08-02 PROCEDURE — 85730 THROMBOPLASTIN TIME PARTIAL: CPT | Performed by: EMERGENCY MEDICINE

## 2024-08-02 PROCEDURE — 85025 COMPLETE CBC W/AUTO DIFF WBC: CPT | Performed by: EMERGENCY MEDICINE

## 2024-08-02 PROCEDURE — 84443 ASSAY THYROID STIM HORMONE: CPT | Performed by: NURSE PRACTITIONER

## 2024-08-02 PROCEDURE — 70547 MR ANGIOGRAPHY NECK W/O DYE: CPT | Performed by: RADIOLOGY

## 2024-08-02 RX ORDER — ACETAMINOPHEN 325 MG/1
650 TABLET ORAL EVERY 6 HOURS PRN
Status: DISCONTINUED | OUTPATIENT
Start: 2024-08-02 | End: 2024-08-03 | Stop reason: HOSPADM

## 2024-08-02 RX ORDER — GLIMEPIRIDE 2 MG/1
2 TABLET ORAL
Status: DISCONTINUED | OUTPATIENT
Start: 2024-08-03 | End: 2024-08-03 | Stop reason: HOSPADM

## 2024-08-02 RX ORDER — METOPROLOL SUCCINATE 25 MG/1
25 TABLET, EXTENDED RELEASE ORAL DAILY
Status: DISCONTINUED | OUTPATIENT
Start: 2024-08-03 | End: 2024-08-03 | Stop reason: HOSPADM

## 2024-08-02 RX ORDER — LOSARTAN POTASSIUM 50 MG/1
100 TABLET ORAL DAILY
Status: DISCONTINUED | OUTPATIENT
Start: 2024-08-03 | End: 2024-08-03 | Stop reason: HOSPADM

## 2024-08-02 RX ORDER — PROPOFOL 10 MG/ML
INJECTION, EMULSION INTRAVENOUS
Status: DISPENSED
Start: 2024-08-02 | End: 2024-08-03

## 2024-08-02 RX ORDER — TALC
3 POWDER (GRAM) TOPICAL NIGHTLY PRN
Status: DISCONTINUED | OUTPATIENT
Start: 2024-08-02 | End: 2024-08-03 | Stop reason: HOSPADM

## 2024-08-02 RX ORDER — INSULIN LISPRO 100 [IU]/ML
0-5 INJECTION, SOLUTION INTRAVENOUS; SUBCUTANEOUS
Status: DISCONTINUED | OUTPATIENT
Start: 2024-08-03 | End: 2024-08-03 | Stop reason: HOSPADM

## 2024-08-02 RX ORDER — DEXTROSE 50 % IN WATER (D50W) INTRAVENOUS SYRINGE
25
Status: DISCONTINUED | OUTPATIENT
Start: 2024-08-02 | End: 2024-08-03 | Stop reason: HOSPADM

## 2024-08-02 RX ORDER — PANTOPRAZOLE SODIUM 40 MG/1
40 TABLET, DELAYED RELEASE ORAL DAILY
Status: DISCONTINUED | OUTPATIENT
Start: 2024-08-03 | End: 2024-08-03 | Stop reason: HOSPADM

## 2024-08-02 RX ORDER — DEXTROSE 50 % IN WATER (D50W) INTRAVENOUS SYRINGE
12.5
Status: DISCONTINUED | OUTPATIENT
Start: 2024-08-02 | End: 2024-08-03 | Stop reason: HOSPADM

## 2024-08-02 RX ORDER — POLYETHYLENE GLYCOL 3350 17 G/17G
17 POWDER, FOR SOLUTION ORAL DAILY PRN
Status: DISCONTINUED | OUTPATIENT
Start: 2024-08-02 | End: 2024-08-03 | Stop reason: HOSPADM

## 2024-08-02 RX ORDER — ONDANSETRON HYDROCHLORIDE 2 MG/ML
4 INJECTION, SOLUTION INTRAVENOUS EVERY 8 HOURS PRN
Status: DISCONTINUED | OUTPATIENT
Start: 2024-08-02 | End: 2024-08-03 | Stop reason: HOSPADM

## 2024-08-02 RX ORDER — ATORVASTATIN CALCIUM 80 MG/1
80 TABLET, FILM COATED ORAL DAILY
Status: DISCONTINUED | OUTPATIENT
Start: 2024-08-03 | End: 2024-08-03 | Stop reason: HOSPADM

## 2024-08-02 RX ORDER — NAPROXEN SODIUM 220 MG/1
81 TABLET, FILM COATED ORAL ONCE
Status: COMPLETED | OUTPATIENT
Start: 2024-08-02 | End: 2024-08-02

## 2024-08-02 RX ADMIN — APIXABAN 5 MG: 5 TABLET, FILM COATED ORAL at 23:28

## 2024-08-02 RX ADMIN — ASPIRIN 81 MG 81 MG: 81 TABLET ORAL at 20:07

## 2024-08-02 SDOH — SOCIAL STABILITY: SOCIAL INSECURITY: DO YOU FEEL UNSAFE GOING BACK TO THE PLACE WHERE YOU ARE LIVING?: NO

## 2024-08-02 SDOH — SOCIAL STABILITY: SOCIAL INSECURITY: HAS ANYONE EVER THREATENED TO HURT YOUR FAMILY OR YOUR PETS?: NO

## 2024-08-02 SDOH — SOCIAL STABILITY: SOCIAL INSECURITY: ARE YOU OR HAVE YOU BEEN THREATENED OR ABUSED PHYSICALLY, EMOTIONALLY, OR SEXUALLY BY ANYONE?: NO

## 2024-08-02 SDOH — SOCIAL STABILITY: SOCIAL INSECURITY: HAVE YOU HAD ANY THOUGHTS OF HARMING ANYONE ELSE?: NO

## 2024-08-02 SDOH — SOCIAL STABILITY: SOCIAL INSECURITY: WERE YOU ABLE TO COMPLETE ALL THE BEHAVIORAL HEALTH SCREENINGS?: YES

## 2024-08-02 SDOH — SOCIAL STABILITY: SOCIAL INSECURITY: DO YOU FEEL ANYONE HAS EXPLOITED OR TAKEN ADVANTAGE OF YOU FINANCIALLY OR OF YOUR PERSONAL PROPERTY?: NO

## 2024-08-02 SDOH — SOCIAL STABILITY: SOCIAL INSECURITY: ABUSE: ADULT

## 2024-08-02 SDOH — SOCIAL STABILITY: SOCIAL INSECURITY: DOES ANYONE TRY TO KEEP YOU FROM HAVING/CONTACTING OTHER FRIENDS OR DOING THINGS OUTSIDE YOUR HOME?: NO

## 2024-08-02 SDOH — SOCIAL STABILITY: SOCIAL INSECURITY: ARE THERE ANY APPARENT SIGNS OF INJURIES/BEHAVIORS THAT COULD BE RELATED TO ABUSE/NEGLECT?: NO

## 2024-08-02 SDOH — SOCIAL STABILITY: SOCIAL INSECURITY: HAVE YOU HAD THOUGHTS OF HARMING ANYONE ELSE?: NO

## 2024-08-02 ASSESSMENT — ENCOUNTER SYMPTOMS
EYE PAIN: 0
FATIGUE: 0
CHILLS: 0
NUMBNESS: 0
EYE REDNESS: 0
SHORTNESS OF BREATH: 0
FEVER: 0
NAUSEA: 1
COUGH: 0
LIGHT-HEADEDNESS: 0
SPEECH DIFFICULTY: 0
VOMITING: 0
HEADACHES: 0
WEAKNESS: 0
DIZZINESS: 0
PALPITATIONS: 0
EYE ITCHING: 0

## 2024-08-02 ASSESSMENT — COGNITIVE AND FUNCTIONAL STATUS - GENERAL
MOBILITY SCORE: 24
DAILY ACTIVITIY SCORE: 24
PATIENT BASELINE BEDBOUND: NO
MOBILITY SCORE: 24
DAILY ACTIVITIY SCORE: 24

## 2024-08-02 ASSESSMENT — PAIN SCALES - GENERAL
PAINLEVEL_OUTOF10: 0 - NO PAIN
PAINLEVEL_OUTOF10: 0 - NO PAIN

## 2024-08-02 ASSESSMENT — PAIN - FUNCTIONAL ASSESSMENT: PAIN_FUNCTIONAL_ASSESSMENT: 0-10

## 2024-08-02 ASSESSMENT — PATIENT HEALTH QUESTIONNAIRE - PHQ9
1. LITTLE INTEREST OR PLEASURE IN DOING THINGS: NOT AT ALL
SUM OF ALL RESPONSES TO PHQ9 QUESTIONS 1 AND 2: 0
2. FEELING DOWN, DEPRESSED OR HOPELESS: NOT AT ALL
2. FEELING DOWN, DEPRESSED OR HOPELESS: NOT AT ALL
SUM OF ALL RESPONSES TO PHQ9 QUESTIONS 1 & 2: 0
1. LITTLE INTEREST OR PLEASURE IN DOING THINGS: NOT AT ALL

## 2024-08-02 ASSESSMENT — COLUMBIA-SUICIDE SEVERITY RATING SCALE - C-SSRS
6. HAVE YOU EVER DONE ANYTHING, STARTED TO DO ANYTHING, OR PREPARED TO DO ANYTHING TO END YOUR LIFE?: NO
1. IN THE PAST MONTH, HAVE YOU WISHED YOU WERE DEAD OR WISHED YOU COULD GO TO SLEEP AND NOT WAKE UP?: NO
2. HAVE YOU ACTUALLY HAD ANY THOUGHTS OF KILLING YOURSELF?: NO

## 2024-08-02 ASSESSMENT — ACTIVITIES OF DAILY LIVING (ADL)
BATHING: INDEPENDENT
LACK_OF_TRANSPORTATION: NO
GROOMING: INDEPENDENT
TOILETING: INDEPENDENT
JUDGMENT_ADEQUATE_SAFELY_COMPLETE_DAILY_ACTIVITIES: YES
FEEDING YOURSELF: INDEPENDENT
ADEQUATE_TO_COMPLETE_ADL: YES
HEARING - RIGHT EAR: FUNCTIONAL
DRESSING YOURSELF: INDEPENDENT
HEARING - LEFT EAR: FUNCTIONAL
WALKS IN HOME: INDEPENDENT
PATIENT'S MEMORY ADEQUATE TO SAFELY COMPLETE DAILY ACTIVITIES?: YES
ASSISTIVE_DEVICE: EYEGLASSES

## 2024-08-02 ASSESSMENT — LIFESTYLE VARIABLES
HOW OFTEN DO YOU HAVE 6 OR MORE DRINKS ON ONE OCCASION: NEVER
HOW MANY STANDARD DRINKS CONTAINING ALCOHOL DO YOU HAVE ON A TYPICAL DAY: PATIENT DOES NOT DRINK
AUDIT-C TOTAL SCORE: 0
SKIP TO QUESTIONS 9-10: 1
HOW OFTEN DO YOU HAVE A DRINK CONTAINING ALCOHOL: NEVER
AUDIT-C TOTAL SCORE: 0
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
## 2024-08-02 NOTE — ED TRIAGE NOTES
Patient to ED for c/o blurred vision that started yesterday. Patient states it suddenly happened while shopping. Patient also reports being nauseated. Patient denies CP/ SOB. Patient states he has a calcified aortic valve. Patient currently on blood thinners. Patient with hx of DM.

## 2024-08-03 VITALS
DIASTOLIC BLOOD PRESSURE: 93 MMHG | HEIGHT: 70 IN | HEART RATE: 66 BPM | WEIGHT: 223 LBS | RESPIRATION RATE: 18 BRPM | BODY MASS INDEX: 31.92 KG/M2 | TEMPERATURE: 97.5 F | OXYGEN SATURATION: 96 % | SYSTOLIC BLOOD PRESSURE: 145 MMHG

## 2024-08-03 LAB
ANION GAP SERPL CALC-SCNC: 15 MMOL/L (ref 10–20)
BUN SERPL-MCNC: 17 MG/DL (ref 6–23)
CALCIUM SERPL-MCNC: 8.2 MG/DL (ref 8.6–10.3)
CHLORIDE SERPL-SCNC: 105 MMOL/L (ref 98–107)
CHOLEST SERPL-MCNC: 97 MG/DL (ref 0–199)
CHOLESTEROL/HDL RATIO: 4
CO2 SERPL-SCNC: 22 MMOL/L (ref 21–32)
CREAT SERPL-MCNC: 1.08 MG/DL (ref 0.5–1.3)
EGFRCR SERPLBLD CKD-EPI 2021: 72 ML/MIN/1.73M*2
ERYTHROCYTE [DISTWIDTH] IN BLOOD BY AUTOMATED COUNT: 13.4 % (ref 11.5–14.5)
EST. AVERAGE GLUCOSE BLD GHB EST-MCNC: 117 MG/DL
GLUCOSE BLD MANUAL STRIP-MCNC: 108 MG/DL (ref 74–99)
GLUCOSE BLD MANUAL STRIP-MCNC: 78 MG/DL (ref 74–99)
GLUCOSE SERPL-MCNC: 106 MG/DL (ref 74–99)
HBA1C MFR BLD: 5.7 %
HCT VFR BLD AUTO: 40 % (ref 41–52)
HDLC SERPL-MCNC: 24.3 MG/DL
HGB BLD-MCNC: 13.3 G/DL (ref 13.5–17.5)
LDLC SERPL CALC-MCNC: 48 MG/DL
MCH RBC QN AUTO: 30.2 PG (ref 26–34)
MCHC RBC AUTO-ENTMCNC: 33.3 G/DL (ref 32–36)
MCV RBC AUTO: 91 FL (ref 80–100)
NON HDL CHOLESTEROL: 73 MG/DL (ref 0–149)
NRBC BLD-RTO: 0 /100 WBCS (ref 0–0)
PLATELET # BLD AUTO: 209 X10*3/UL (ref 150–450)
POTASSIUM SERPL-SCNC: 4 MMOL/L (ref 3.5–5.3)
RBC # BLD AUTO: 4.41 X10*6/UL (ref 4.5–5.9)
SODIUM SERPL-SCNC: 138 MMOL/L (ref 136–145)
TRIGL SERPL-MCNC: 124 MG/DL (ref 0–149)
VLDL: 25 MG/DL (ref 0–40)
WBC # BLD AUTO: 4.6 X10*3/UL (ref 4.4–11.3)

## 2024-08-03 PROCEDURE — 36415 COLL VENOUS BLD VENIPUNCTURE: CPT | Performed by: NURSE PRACTITIONER

## 2024-08-03 PROCEDURE — 2500000001 HC RX 250 WO HCPCS SELF ADMINISTERED DRUGS (ALT 637 FOR MEDICARE OP): Performed by: NURSE PRACTITIONER

## 2024-08-03 PROCEDURE — 99221 1ST HOSP IP/OBS SF/LOW 40: CPT | Performed by: STUDENT IN AN ORGANIZED HEALTH CARE EDUCATION/TRAINING PROGRAM

## 2024-08-03 PROCEDURE — 85027 COMPLETE CBC AUTOMATED: CPT | Performed by: NURSE PRACTITIONER

## 2024-08-03 PROCEDURE — 80061 LIPID PANEL: CPT | Performed by: NURSE PRACTITIONER

## 2024-08-03 PROCEDURE — 82947 ASSAY GLUCOSE BLOOD QUANT: CPT

## 2024-08-03 PROCEDURE — 99291 CRITICAL CARE FIRST HOUR: CPT | Performed by: EMERGENCY MEDICINE

## 2024-08-03 PROCEDURE — G0378 HOSPITAL OBSERVATION PER HR: HCPCS

## 2024-08-03 PROCEDURE — 80048 BASIC METABOLIC PNL TOTAL CA: CPT | Performed by: NURSE PRACTITIONER

## 2024-08-03 RX ADMIN — GLIMEPIRIDE 2 MG: 2 TABLET ORAL at 07:03

## 2024-08-03 RX ADMIN — PANTOPRAZOLE SODIUM 40 MG: 40 TABLET, DELAYED RELEASE ORAL at 09:33

## 2024-08-03 RX ADMIN — APIXABAN 5 MG: 5 TABLET, FILM COATED ORAL at 09:33

## 2024-08-03 RX ADMIN — DILTIAZEM HYDROCHLORIDE 300 MG: 180 CAPSULE, COATED, EXTENDED RELEASE ORAL at 09:32

## 2024-08-03 RX ADMIN — LOSARTAN POTASSIUM 100 MG: 50 TABLET, FILM COATED ORAL at 09:33

## 2024-08-03 RX ADMIN — METOPROLOL SUCCINATE 25 MG: 25 TABLET, EXTENDED RELEASE ORAL at 09:33

## 2024-08-03 RX ADMIN — ATORVASTATIN CALCIUM 80 MG: 80 TABLET, FILM COATED ORAL at 09:33

## 2024-08-03 ASSESSMENT — PAIN SCALES - GENERAL: PAINLEVEL_OUTOF10: 0 - NO PAIN

## 2024-08-03 ASSESSMENT — COGNITIVE AND FUNCTIONAL STATUS - GENERAL
DAILY ACTIVITIY SCORE: 24
MOBILITY SCORE: 24

## 2024-08-03 ASSESSMENT — ENCOUNTER SYMPTOMS
SHORTNESS OF BREATH: 0
NAUSEA: 1
DIZZINESS: 0
CHILLS: 1
DIAPHORESIS: 1
FEVER: 0
APPETITE CHANGE: 0
LIGHT-HEADEDNESS: 0
SPEECH DIFFICULTY: 0
ACTIVITY CHANGE: 0
CHEST TIGHTNESS: 0
NUMBNESS: 0

## 2024-08-03 ASSESSMENT — PAIN - FUNCTIONAL ASSESSMENT: PAIN_FUNCTIONAL_ASSESSMENT: 0-10

## 2024-08-03 ASSESSMENT — VISUAL ACUITY: VA_NORMAL: 1
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
## 2024-08-03 NOTE — PROGRESS NOTES
08/03/24 0847   Discharge Planning   Living Arrangements Spouse/significant other   Support Systems Spouse/significant other   Assistance Needed Met with patient. is obs, aware. will dc home nn   Type of Residence Private residence   Home or Post Acute Services None   Expected Discharge Disposition Home     Franky Sauceda is a 73 y.o. male on day 0 of admission presenting with TIA (transient ischemic attack).    Floresita Lehman RN

## 2024-08-03 NOTE — H&P
History Of Present Illness  Franky Sauceda is a 73 y.o. male, with a PMH of CAD, pAF/flutter on Eliquis, HTN, HTN, known severe aortic stenosis, DM, GERD, BPH, and CKD IIIa who presented to [facility] ED on 8/2/2024 for CVA evaluation. Patient was seen by his PCP prior to arrival for c/o vision changes with associated nausea the day prior. Patient reports that he developed sudden double vision, preceded by nausea and diaphoresis ~1 hour prior, while driving. He pulled to the side of the road and wife took over driving. Vision changes resolved after approximately an hour once patient was home and drank some orange juice, blood glucose was 75 at the time. Nausea and diaphoresis subsided at that time as well. Patient denies any lightheadedness, dizziness, focal weakness, changes in speech, chest pain, numbness/tingling, SOB, palpations, or recent trauma/falls/travel/sick contacts. His PCP was concerned about a neurological issue so patient was referred to the ED via EMS from outpatient clinic. Vision remains back to baseline but still feels like eyes are not quite right.        Surgical History  He has a past surgical history that includes Colonoscopy (01/28/2014); Esophagogastroduodenoscopy (01/28/2014); and Cholecystectomy (01/28/2014).     Social History  He reports that he has never smoked. He has never used smokeless tobacco. He reports that he does not currently use alcohol. He reports that he does not use drugs.    Family History  Family History   Problem Relation Name Age of Onset    Hypertension Mother      Other (abdominal aortic aneurysm) Father      Hyperlipidemia Father      Hypertension Father          Allergies  Cerivastatin, Jardiance [empagliflozin], Nifedipine, Statins-hmg-coa reductase inhibitors, and Icosapent ethyl    Review of Systems   Constitutional:  Positive for chills and diaphoresis. Negative for activity change, appetite change and fever.   Eyes:  Positive for visual disturbance.    Respiratory:  Negative for chest tightness and shortness of breath.    Cardiovascular:  Negative for chest pain.   Gastrointestinal:  Positive for nausea.   Neurological:  Negative for dizziness, speech difficulty, light-headedness and numbness.        Physical Exam  Constitutional:       General: He is not in acute distress.     Appearance: Normal appearance. He is not ill-appearing.   HENT:      Head: Normocephalic and atraumatic.   Cardiovascular:      Rate and Rhythm: Normal rate and regular rhythm.      Heart sounds: No murmur heard.     No friction rub. No gallop.   Pulmonary:      Effort: Pulmonary effort is normal.      Breath sounds: Normal breath sounds. No wheezing or rhonchi.   Abdominal:      General: Abdomen is flat.      Palpations: Abdomen is soft.   Musculoskeletal:      Cervical back: Normal range of motion and neck supple.      Right lower leg: No edema.      Left lower leg: No edema.   Skin:     General: Skin is warm and dry.   Neurological:      General: No focal deficit present.      Mental Status: He is alert and oriented to person, place, and time.   Psychiatric:         Mood and Affect: Mood normal.         Behavior: Behavior normal.         Thought Content: Thought content normal.          Last Recorded Vitals  /81   Pulse 73   Temp 36.4 °C (97.5 °F) (Temporal)   Resp 18   Wt 101 kg (223 lb)   SpO2 96%     Relevant Results  Scheduled medications  apixaban, 5 mg, oral, BID  atorvastatin, 80 mg, oral, Daily  dilTIAZem CD, 300 mg, oral, Daily  glimepiride, 2 mg, oral, BID AC  insulin lispro, 0-5 Units, subcutaneous, TID  losartan, 100 mg, oral, Daily  metoprolol succinate XL, 25 mg, oral, Daily  pantoprazole, 40 mg, oral, Daily      Continuous medications     PRN medications  PRN medications: acetaminophen, dextrose, dextrose, glucagon, glucagon, melatonin, ondansetron, polyethylene glycol  Results for orders placed or performed during the hospital encounter of 08/02/24 (from the  past 24 hour(s))   CBC and Auto Differential   Result Value Ref Range    WBC 5.0 4.4 - 11.3 x10*3/uL    nRBC 0.0 0.0 - 0.0 /100 WBCs    RBC 4.74 4.50 - 5.90 x10*6/uL    Hemoglobin 14.5 13.5 - 17.5 g/dL    Hematocrit 43.5 41.0 - 52.0 %    MCV 92 80 - 100 fL    MCH 30.6 26.0 - 34.0 pg    MCHC 33.3 32.0 - 36.0 g/dL    RDW 13.6 11.5 - 14.5 %    Platelets 222 150 - 450 x10*3/uL    Neutrophils % 47.8 40.0 - 80.0 %    Immature Granulocytes %, Automated 0.2 0.0 - 0.9 %    Lymphocytes % 41.0 13.0 - 44.0 %    Monocytes % 10.2 2.0 - 10.0 %    Eosinophils % 0.6 0.0 - 6.0 %    Basophils % 0.2 0.0 - 2.0 %    Neutrophils Absolute 2.38 1.60 - 5.50 x10*3/uL    Immature Granulocytes Absolute, Automated 0.01 0.00 - 0.50 x10*3/uL    Lymphocytes Absolute 2.04 0.80 - 3.00 x10*3/uL    Monocytes Absolute 0.51 0.05 - 0.80 x10*3/uL    Eosinophils Absolute 0.03 0.00 - 0.40 x10*3/uL    Basophils Absolute 0.01 0.00 - 0.10 x10*3/uL   Comprehensive metabolic panel   Result Value Ref Range    Glucose 80 74 - 99 mg/dL    Sodium 139 136 - 145 mmol/L    Potassium 4.5 3.5 - 5.3 mmol/L    Chloride 105 98 - 107 mmol/L    Bicarbonate 23 21 - 32 mmol/L    Anion Gap 16 10 - 20 mmol/L    Urea Nitrogen 19 6 - 23 mg/dL    Creatinine 1.32 (H) 0.50 - 1.30 mg/dL    eGFR 57 (L) >60 mL/min/1.73m*2    Calcium 9.0 8.6 - 10.3 mg/dL    Albumin 4.0 3.4 - 5.0 g/dL    Alkaline Phosphatase 113 33 - 136 U/L    Total Protein 7.0 6.4 - 8.2 g/dL    AST 28 9 - 39 U/L    Bilirubin, Total 0.9 0.0 - 1.2 mg/dL    ALT 34 10 - 52 U/L   Protime-INR   Result Value Ref Range    Protime 15.5 (H) 9.8 - 12.8 seconds    INR 1.4 (H) 0.9 - 1.1   aPTT   Result Value Ref Range    aPTT 36 27 - 38 seconds   Troponin I, High Sensitivity   Result Value Ref Range    Troponin I, High Sensitivity 4 0 - 20 ng/L   B-Type Natriuretic Peptide   Result Value Ref Range    BNP 10 0 - 99 pg/mL   TSH with reflex to Free T4 if abnormal   Result Value Ref Range    Thyroid Stimulating Hormone 1.61 0.44 - 3.98  mIU/L   Lipid Panel   Result Value Ref Range    Cholesterol 97 0 - 199 mg/dL    HDL-Cholesterol 24.3 mg/dL    Cholesterol/HDL Ratio 4.0     LDL Calculated 48 <=99 mg/dL    VLDL 25 0 - 40 mg/dL    Triglycerides 124 0 - 149 mg/dL    Non HDL Cholesterol 73 0 - 149 mg/dL   CBC   Result Value Ref Range    WBC 4.6 4.4 - 11.3 x10*3/uL    nRBC 0.0 0.0 - 0.0 /100 WBCs    RBC 4.41 (L) 4.50 - 5.90 x10*6/uL    Hemoglobin 13.3 (L) 13.5 - 17.5 g/dL    Hematocrit 40.0 (L) 41.0 - 52.0 %    MCV 91 80 - 100 fL    MCH 30.2 26.0 - 34.0 pg    MCHC 33.3 32.0 - 36.0 g/dL    RDW 13.4 11.5 - 14.5 %    Platelets 209 150 - 450 x10*3/uL   Basic metabolic panel   Result Value Ref Range    Glucose 106 (H) 74 - 99 mg/dL    Sodium 138 136 - 145 mmol/L    Potassium 4.0 3.5 - 5.3 mmol/L    Chloride 105 98 - 107 mmol/L    Bicarbonate 22 21 - 32 mmol/L    Anion Gap 15 10 - 20 mmol/L    Urea Nitrogen 17 6 - 23 mg/dL    Creatinine 1.08 0.50 - 1.30 mg/dL    eGFR 72 >60 mL/min/1.73m*2    Calcium 8.2 (L) 8.6 - 10.3 mg/dL   POCT GLUCOSE   Result Value Ref Range    POCT Glucose 78 74 - 99 mg/dL    MR angio head wo IV contrast    Result Date: 8/2/2024  Interpreted By:  Nathaniel Lara, STUDY: MR ANGIO HEAD WO IV CONTRAST;  8/2/2024 8:34 pm   INDICATION: Signs/Symptoms:double vision.   COMPARISON: None.   ACCESSION NUMBER(S): [id number]   ORDERING CLINICIAN: EVY MISHRA   TECHNIQUE: 3D Time-of-flight noncontrast MRA of the head was performed. The images were reviewed as source images and maximum intensity projections.   FINDINGS: Intracranial internal carotid arteries: Normal appearing flow signal.   Anterior cerebral arteries: Normal appearing flow signal.   Middle cerebral arteries: Normal appearing flow signal.   Intracranial vertebral arteries: Normal appearing flow signal.   Basilar artery: Normal appearing flow signal.   Posterior communicating arteries: Present bilaterally.   Posterior cerebral arteries: Normal appearing flow signal.       No  evidence of intracranial source vessel arterial occlusion, flow significant stenosis, or aneurysm.   MACRO: None   Signed by: Nathaniel Lara 8/2/2024 9:16 PM Dictation workstation:   [id number]    MR angio neck wo IV contrast    Result Date: 8/2/2024  Interpreted By:  Nathaniel Lara, STUDY: MR ANGIO NECK WO IV CONTRAST;  8/2/2024 8:34 pm   INDICATION: Signs/Symptoms:double vision.   COMPARISON: None.   ACCESSION NUMBER(S): [id number]   ORDERING CLINICIAN: EVY MISHRA   TECHNIQUE: Time of flight MRA of the neck was performed. The images were reviewed as source images and maximum intensity projections.   FINDINGS: The source images are mildly degraded by artifact.   Right carotid vessels:  There is expected flow signal in the visualized portion of the common carotid artery.  There is mild attenuation of flow signal at the carotid bifurcation which may be secondary to flow related artifact. The internal carotid artery in the neck demonstrates expected flow signal.  There is less than 50% stenosis .   Left carotid vessels:   There is expected flow signal in the visualized portion of the common carotid artery.  There is mild attenuation of flow signal at the carotid bifurcation which may be secondary to flow related artifact. The internal carotid artery in the neck demonstrates expected flow signal.  There is less than 50% stenosis .   Vertebral vessels:   The visualized segments of the cervical vertebral arteries demonstrate expected flow signal.       No evidence of significant stenosis on MRA of the neck.   MACRO: None   Signed by: Nathaniel Lara 8/2/2024 9:15 PM Dictation workstation:   [id number]    MR brain wo IV contrast    Result Date: 8/2/2024  Interpreted By:  Nathaniel Lara, STUDY: MR BRAIN WO IV CONTRAST;  8/2/2024 8:34 pm   INDICATION: Signs/Symptoms:double vision.   COMPARISON: None.   ACCESSION NUMBER(S): [id number]   ORDERING CLINICIAN: EVY MISHRA   TECHNIQUE: Axial T2, FLAIR, DWI,  gradient echo T2 and sagittal and coronal T1 weighted images of brain were acquired.   FINDINGS: CSF Spaces: The ventricles, sulci and basal cisterns are within normal limits. Mild chronic small vessel ischemic change detected. As evidenced by FLAIR signal abnormality seen in the periventricular white   Parenchyma: There is no diffusion restriction abnormality to suggest acute infarct.  There is no focal parenchymal signal abnormality. There is no mass effect or midline shift.   Paranasal Sinuses and Mastoids: Visualized paranasal sinuses and mastoid air cells are unremarkable.       No evidence of acute infarct, intracranial mass effect or midline shift. Senescent change   MACRO: None   Signed by: Nathaniel Lara 8/2/2024 9:14 PM Dictation workstation:   [id number]    CT head wo IV contrast    Result Date: 8/2/2024  Interpreted By:  Noe Hwang, STUDY: CT HEAD WO IV CONTRAST;  8/2/2024 4:56 pm   INDICATION: Signs/Symptoms:sudden double vision yesterday x 40 minutes at 1400.   COMPARISON: None.   ACCESSION NUMBER(S): [id number]   ORDERING CLINICIAN: EVY MISHRA   TECHNIQUE: Noncontrast axial CT images of head were obtained with coronal and sagittal reconstructed images.   FINDINGS: BRAIN PARENCHYMA: Gray-white differentiation is preserved. No mass-effect, midline shift or effacement of cerebral sulci. Patchy periventricular and subcortical white matter hypodensities, nonspecific but often seen in the setting of chronic microangiopathic change.   HEMORRHAGE: No acute intracranial hemorrhage.   VENTRICLES and EXTRA-AXIAL SPACES: The ventricles and sulci are within normal limits for brain volume. No abnormal extra-axial fluid collection.   ORBITS: The visualized orbits and globes are within normal limits.   EXTRACRANIAL SOFT TISSUES: Within normal limits.   PARANASAL SINUSES/MASTOIDS: The visualized paranasal sinuses and mastoid air cells are well aerated.   CALVARIUM: No depressed skull fracture.         1.  No acute intracranial abnormality identified. 2. Nonspecific white matter changes, likely sequela of small-vessel ischemic disease.     MRI may be performed for further assessment as clinically warranted.   MACRO: None   Signed by: Noe Hwang 8/2/2024 5:27 PM Dictation workstation:   [id number]    XR chest 1 view    Result Date: 8/2/2024  STUDY: Chest Radiograph;  08/02/2024 3:57PM INDICATION: Sudden double vision and hypertension. COMPARISON: None Available ACCESSION NUMBER(S): [id number] ORDERING CLINICIAN: EVY MISHRA TECHNIQUE:  Frontal chest was obtained at 15:57 hours. FINDINGS: CARDIOMEDIASTINAL SILHOUETTE: Cardiomediastinal silhouette is normal in size and configuration.  LUNGS: Lungs are clear.  ABDOMEN: No remarkable upper abdominal findings.  BONES: No acute osseous changes.    No radiographic evidence of acute cardiopulmonary disease. Signed by Cameron Tong MD           ASSESSMENT/PLAN  Mr.Thomas ANGEL Sauceda is a 73 y.o. male who present s/p episode of double vision and is admitted for TIA. PMH includes CAD, pAF/flutter on Eliquis, HTN, HTN, known severe aortic stenosis, DM, GERD, BPH, and CKD IIIa.  ED COURSE: VSS, labs notable for mild increase in Cr but other unremarkable; CTH negative, as well as MRI/MRA  -Case discussed with neurology on call who reviewed imaging and noted calcifications in the vertebral arteries that the neurologist describes as very tight   - Given ASA 81mg x1    TIA, vision changes- Symptoms now resolved  - Seen by neurology  - NIH Stroke Scale: 0  - CTH wo acute changes  - MRI no acute infarct, intracranial mass effect, or midline shift  - Tight calcifications in the vertebral arteries per neurology  - Tele   - Neuro checks Q4  - Lipid panel, HgA1c, BNP  - Recent echo Nov 2023, will defer to cardiology when next echo is needed   CAD  - c/w statin and Eliquis    HTN  - BP controlled on Losartan 100mg daily, Metoprolol Succinate 25mg daily, Diltiazem 300mg daily    HLD  -  On high-intensity statin, LDL acceptable    pAF/flutter  -c/w Metoprolol and Diltiazem  -AC with Eliquis    Severe Aortic Stenosis  - Asymptomatic, last TTE 3/2024    T2DM  -Hold Metformin while admitted c/w home Amaryl    CKD IIIa  - Slight increased in Cr on admission, will trend    GERD   - c/w PPI          GI/VTE PPX: PPI, Eliquis  Code Status: Full Code  Disposition: Discharge home once medically cleared and stable, pending neurology recommendations    Discussed with Dr Petit and the interdisciplinary team     Verena Beltran PA-C

## 2024-08-03 NOTE — CONSULTS
Subjective   History Of Present Illness  Franky Sauceda is a 73 y.o. male presenting arrived on 8/2/2024 with double vision lasting ~40 mins. Corrected with covering one eye.  Patient states he was in his usual state of health out shopping with his wife when he had sudden onset double vision shortly after getting the car while driving.  This was associate with diaphoresis. Sxs lasting 30 to 45 minutes.  At some point he switched drivers because he could not see straight.  The vision appeared to correct with covering of 1 eye and may have also been associated with some feeling of disequilibrium but no clear vertigo.      He ate approximately 1 hour before and drank a diet soda.  Uncertain if he was significantly dehydrated, did not think so.  He also felt nauseous but this is not unusual with his diabetes medications.  He adamantly denies any speech changes, unilateral weakness, numbness, tingling.    No symptoms like this in the past  Continues to take his Eliquis religiously.      Review of systems as noted above, otherwise negative.    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    Bence Huang proteinuria    Coronary artery disease involving native coronary artery of native heart without angina pectoris 04/02/2024    CAC 2101 (2017)    Dorsalgia, unspecified 09/27/2016    Back pain, acute    Encounter for immunization 06/29/2018    Need for shingles vaccine    Localized edema 07/07/2022    Edema of extremities    Moderate aortic stenosis 04/02/2024    PAF (paroxysmal atrial fibrillation) (Multi) 04/02/2024    Pain in left knee 12/08/2021    Acute pain of left knee    Pain in unspecified knee 04/28/2020    Joint pain, knee    Personal history of other diseases of the circulatory system     Personal history of coronary atherosclerosis    Personal history of other diseases of urinary system 07/07/2022    History of renal insufficiency syndrome    Personal  "history of other infectious and parasitic diseases 05/14/2018    History of herpes labialis    Severe aortic stenosis 04/02/2024    Sprain of other specified parts of left knee, initial encounter 01/02/2020    Injury of meniscus of left knee, initial encounter    Unilateral primary osteoarthritis, left knee 08/20/2019    Arthritis of knee, left     Surgical History  Past Surgical History:   Procedure Laterality Date    CHOLECYSTECTOMY  01/28/2014    Cholecystectomy    COLONOSCOPY  01/28/2014    Complete Colonoscopy    ESOPHAGOGASTRODUODENOSCOPY  01/28/2014    Diagnostic Esophagogastroduodenoscopy     Social History  Social History     Tobacco Use    Smoking status: Never    Smokeless tobacco: Never   Vaping Use    Vaping status: Never Used   Substance Use Topics    Alcohol use: Not Currently    Drug use: Never     Allergies  Reviewed in EMR       Objective   Last Recorded Vitals  Blood pressure 129/88, pulse 76, temperature 36.4 °C (97.5 °F), temperature source Temporal, resp. rate 12, height 1.778 m (5' 10\"), weight 101 kg (223 lb), SpO2 96%.    Scheduled medications  apixaban, 5 mg, oral, BID  atorvastatin, 80 mg, oral, Daily  dilTIAZem CD, 300 mg, oral, Daily  glimepiride, 2 mg, oral, BID AC  insulin lispro, 0-5 Units, subcutaneous, TID  losartan, 100 mg, oral, Daily  metoprolol succinate XL, 25 mg, oral, Daily  pantoprazole, 40 mg, oral, Daily      Continuous medications     PRN medications  PRN medications: acetaminophen, dextrose, dextrose, glucagon, glucagon, melatonin, ondansetron, polyethylene glycol         Exam   Neurological Exam  Mental Status  Awake, alert and oriented to person, place and time. Recent and remote memory are intact. Speech is normal. Language is fluent with no aphasia. Attention and concentration are normal.    Cranial Nerves  CN II: Visual acuity is normal. Visual fields full to confrontation.  CN III, IV, VI: Extraocular movements intact bilaterally. No nystagmus. Normal saccades. " Diminished smooth pursuit. Normal lids and orbits bilaterally. Pupils equal round and reactive to light bilaterally.  CN V: Facial sensation is normal.  CN VII: Full and symmetric facial movement.  CN VIII: Hearing is normal.  CN IX, X: Palate elevates symmetrically  CN XI: Shoulder shrug strength is normal.  CN XII: Tongue midline without atrophy or fasciculations.    Motor  Normal muscle bulk throughout. No fasciculations present. Normal muscle tone. No abnormal involuntary movements. Strength is 5/5 throughout all four extremities.    Sensory  Light touch is normal in upper and lower extremities.     Reflexes                                            Right                      Left  Brachioradialis                    2+                         2+  Biceps                                 2+                         2+  Triceps                                2+                         2+  Patellar                                1+                         1+  Achilles                                1+                         1+    Right pathological reflexes: Ankle clonus absent.  Left pathological reflexes: Ankle clonus absent.    Coordination  Right: Finger-to-nose normal. Rapid alternating movement normal. Heel-to-shin normal.Left: Finger-to-nose normal. Rapid alternating movement normal. Heel-to-shin normal.    Physical Exam  Eyes:      General: Lids are normal.      Extraocular Movements: EOM normal. No nystagmus.      Pupils: Pupils are equal, round, and reactive to light.   Neurological:      Motor: Motor strength is normal.     Deep Tendon Reflexes:      Reflex Scores:       Tricep reflexes are 2+ on the right side and 2+ on the left side.       Bicep reflexes are 2+ on the right side and 2+ on the left side.       Brachioradialis reflexes are 2+ on the right side and 2+ on the left side.       Patellar reflexes are 1+ on the right side and 1+ on the left side.       Achilles reflexes are 1+ on the right side  and 1+ on the left side.  Psychiatric:         Speech: Speech normal.         Relevant Results    Lab Results  Results from last 72 hours   Lab Units 08/03/24  0640 08/02/24  1546   GLUCOSE mg/dL 106* 80   SODIUM mmol/L 138 139   POTASSIUM mmol/L 4.0 4.5   CHLORIDE mmol/L 105 105   CO2 mmol/L 22 23   ANION GAP mmol/L 15 16   BUN mg/dL 17 19   CREATININE mg/dL 1.08 1.32*   EGFR mL/min/1.73m*2 72 57*   CALCIUM mg/dL 8.2* 9.0   ALBUMIN g/dL  --  4.0      Results from last 72 hours   Lab Units 08/03/24  0640 08/02/24  1546   WBC AUTO x10*3/uL 4.6 5.0   NRBC AUTO /100 WBCs 0.0 0.0   RBC AUTO x10*6/uL 4.41* 4.74   HEMOGLOBIN g/dL 13.3* 14.5   HEMATOCRIT % 40.0* 43.5   MCV fL 91 92   MCH pg 30.2 30.6   MCHC g/dL 33.3 33.3   RDW % 13.4 13.6   PLATELETS AUTO x10*3/uL 209 222   NEUTROS PCT AUTO %  --  47.8   IG PCT AUTO %  --  0.2   LYMPHS PCT AUTO %  --  41.0   MONOS PCT AUTO %  --  10.2   EOS PCT AUTO %  --  0.6   BASOS PCT AUTO %  --  0.2   NEUTROS ABS x10*3/uL  --  2.38   IG AUTO x10*3/uL  --  0.01   LYMPHS ABS AUTO x10*3/uL  --  2.04   MONOS ABS AUTO x10*3/uL  --  0.51   EOS ABS AUTO x10*3/uL  --  0.03   BASOS ABS AUTO x10*3/uL  --  0.01      Results from last 72 hours   Lab Units 08/02/24  1546   PROTIME seconds 15.5*   INR  1.4*   APTT seconds 36          MR brain wo IV contrast   Final Result   No evidence of acute infarct, intracranial mass effect or midline   shift. Senescent change        MACRO:   None        Signed by: Nathaniel Lara 8/2/2024 9:14 PM   Dictation workstation:   [id number]      MR angio head wo IV contrast   Final Result   No evidence of intracranial source vessel arterial occlusion, flow   significant stenosis, or aneurysm.        MACRO:   None        Signed by: Nathaniel Lara 8/2/2024 9:16 PM   Dictation workstation:   [id number]      MR angio neck wo IV contrast   Final Result   No evidence of significant stenosis on MRA of the neck.        MACRO:   None        Signed by: Nathaniel Lara  8/2/2024 9:15 PM   Dictation workstation:   [id number]      CT head wo IV contrast   Final Result   1. No acute intracranial abnormality identified.   2. Nonspecific white matter changes, likely sequela of small-vessel   ischemic disease.             MRI may be performed for further assessment as clinically warranted.        MACRO:   None        Signed by: Noe Hwang 8/2/2024 5:27 PM   Dictation workstation:   [id number]      XR chest 1 view   Final Result   No radiographic evidence of acute cardiopulmonary disease.   Signed by Cameron Tong MD            Assessment/Plan     #TIA    Type: Ischemic stroke  Subtype/etiology: TIA, notable calcifications on the bilateral vertebral arteries.   NIH: 0 at time of exam  Diagnostic evaluation: CT, MRI/MRA no acute infarct of diffusion restriction to my review  Antiplatelet/antithrombotic plan for stroke prevention: continue home eliquis.   Mild elevation in Cr on labs. Will recommend continued Eliquis without addition of ASA due to possible transient hypoperfusion with mild hydration. Low threshold to add ASA if spells recur.   Vascular Risk Factor modification goals:  Blood pressure goals: avoid hypotension SBP <100 that could worsen cerebral perfusion,  long term goal of normotension, <140  Lipid Goals: education on healthy diet and statin therapy to maintain or achieve goal LDL-cholesterol < 70mg  Lab Results   Component Value Date    LDLCALC 48 08/03/2024     Glucose Goals: early treatment of hyperglycemia to goal glucose 140-180 mg/dl with long-term goal A1c < 7%   Lab Results   Component Value Date    HGBA1C 6.7 (H) 04/16/2024     Smoking Cessation and Education  Neurochecks per protocol  Telemetry while admitted  Extended cardiac monitoring is not recommended at discharge  Will defer for cardiology on next echo, last completed 11/2023, unlikely to  from stroke perspective at this time as he is optimized on medical therapy  Assessment for  Rehabilitation needs   Patient and family education on signs and symptoms of stroke, calling 911, healthy strategies for stroke prevention.    Please schedule stroke clinic follow up at discharge  Plan discussed with primary team  No further workup, please call with questions         I spent 45 minutes in the professional and overall care of this patient.      Catracho Velazquez MD

## 2024-08-03 NOTE — DISCHARGE SUMMARY
Discharge Diagnosis  TIA (transient ischemic attack)    Issues Requiring Follow-Up  PCP and Neurology follow up     Discharge Meds     Your medication list        CHANGE how you take these medications        Instructions Last Dose Given Next Dose Due   dilTIAZem  mg 24 hr capsule  Commonly known as: Tiazac  What changed: when to take this      Take 1 capsule (300 mg) by mouth once daily.       losartan 100 mg tablet  Commonly known as: Cozaar  What changed: when to take this      Take 1 tablet (100 mg) by mouth once daily.       metoprolol succinate XL 25 mg 24 hr tablet  Commonly known as: Toprol-XL  What changed: when to take this      Take 1 tablet (25 mg) by mouth once daily.       pantoprazole 40 mg EC tablet  Commonly known as: ProtoNix  What changed: when to take this      Take 1 tablet (40 mg) by mouth once daily.              CONTINUE taking these medications        Instructions Last Dose Given Next Dose Due   Accu-Chek Guide test strips strip  Generic drug: blood sugar diagnostic           apixaban 5 mg tablet  Commonly known as: Eliquis      Take 1 tablet (5 mg) by mouth 2 times a day.       atorvastatin 80 mg tablet  Commonly known as: Lipitor      Take 1 tablet (80 mg) by mouth once daily.       cholecalciferol 50 mcg (2,000 unit) capsule  Commonly known as: Vitamin D-3           Dexcom G6 Sensor device  Generic drug: blood-glucose sensor  Notes to patient: Every 10 days      Apply per instructions every 10 days       glimepiride 2 mg tablet  Commonly known as: Amaryl      Take 1 tablet (2 mg) by mouth 2 times a day before meals.       metFORMIN 500 mg tablet  Commonly known as: Glucophage      Take 1 tablet (500 mg) by mouth 2 times a day with meals.       Ozempic 0.25 mg or 0.5 mg (2 mg/3 mL) pen injector  Generic drug: semaglutide      Inject 0.5 mg under the skin 1 (one) time per week.                Test Results Pending At Discharge  Pending Labs       Order Current Status    Hemoglobin A1C  In process            Hospital Course  Mr.Thomas ANGEL Sauceda is a 73 y.o. male who present s/p episode of double vision and is admitted for TIA. PMH includes CAD, pAF/flutter on Eliquis, HTN, HTN, known severe aortic stenosis, DM, GERD, BPH, and CKD IIIa.  ED COURSE: VSS, labs notable for mild increase in Cr but other unremarkable; CTH negative, as well as MRI/MRA  -Case discussed with neurology on call who reviewed imaging and noted calcifications in the vertebral arteries that the neurologist describes as very tight   - Given ASA 81mg x1     TIA, vision changes- Symptoms now resolved  - Seen by neurology  - NIH Stroke Scale: 0  - CTH wo acute changes  - MRI no acute infarct, intracranial mass effect, or midline shift  - Tight calcifications in the vertebral arteries per neurology  - Tele   - Neuro checks Q4  - Lipid panel, HgA1c, BNP  - Recent echo Nov 2023, will defer to cardiology when next echo is needed   CAD  - c/w statin and Eliquis     HTN  - BP controlled on Losartan 100mg daily, Metoprolol Succinate 25mg daily, Diltiazem 300mg daily     HLD  - On high-intensity statin, LDL acceptable     pAF/flutter  -c/w Metoprolol and Diltiazem  -AC with Eliquis     Severe Aortic Stenosis  - Asymptomatic, last TTE 3/2024     T2DM  -Hold Metformin while admitted c/w home Amaryl     CKD IIIa  - Slight increased in Cr on admission, will trend     GERD   - c/w PPI              GI/VTE PPX: PPI, Eliquis  Code Status: Full Code  Disposition: Medically clear for discharge by neurology. Pt to follow up with PCP and neurology outpatient.     Pertinent Physical Exam At Time of Discharge  Physical Exam  Constitutional:       General: He is not in acute distress.     Appearance: Normal appearance. He is not ill-appearing or toxic-appearing.   HENT:      Head: Normocephalic and atraumatic.   Cardiovascular:      Rate and Rhythm: Normal rate and regular rhythm.      Pulses: Normal pulses.      Heart sounds: Normal heart sounds. No  murmur heard.     No friction rub. No gallop.   Pulmonary:      Effort: Pulmonary effort is normal.      Breath sounds: Normal breath sounds. No wheezing or rhonchi.   Abdominal:      General: Abdomen is flat.      Palpations: Abdomen is soft.   Musculoskeletal:         General: Normal range of motion.   Skin:     General: Skin is warm and dry.   Neurological:      General: No focal deficit present.      Mental Status: He is alert and oriented to person, place, and time. Mental status is at baseline.   Psychiatric:         Mood and Affect: Mood normal.         Behavior: Behavior normal.         Thought Content: Thought content normal.         Outpatient Follow-Up  Future Appointments   Date Time Provider Department Center   10/2/2024  1:10 PM Loy Soni MD [id number] [facility]   10/16/2024 10:30 AM TONY Cerna-CNP [id number] [facility]         Verena Beltran PA-C

## 2024-08-03 NOTE — NURSING NOTE
Patient has been discharged to home with no new needs. Discharge instructions reviewed with the patient and his spouse, both verbalized understanding. Patient denied any questions or concerns.

## 2024-08-03 NOTE — HOSPITAL COURSE
"  Franky Sauceda is a 73 y.o. male, with a PMH of CAD, pAF/flutter on Eliquis, HTN, HTN, known severe aortic stenosis, DM, GERD, BPH, and CKD IIIa who presented to [facility] ED on 8/2/2024 for CVA evaluation. Patient was seen by his PCP prior to arrival for c/o vision changes with associated nausea the day prior. Patient reports that he developed sudden double vision, preceded by nausea and diaphoresis ~1 hour prior, while driving. He pulled to the side of the road and wife took over driving. Vision changes resolved after approximately an hour once patient was home and drank some orange juice, blood glucose was 75 at the time. Nausea and diaphoresis subsided at that time as well. Patient denies any lightheadedness, dizziness, focal weakness, changes in speech, chest pain, numbness/tingling, SOB, palpations, or recent trauma/falls/travel/sick contacts. His PCP was concerned about a neurological issue so patient was referred to the ED via EMS from outpatient clinic. Vision remains back to baseline but still feels like eyes are not quite right.    ED COURSE: VSS, labs notable for mild increase in Cr but other unremarkable; CTH negative, as well as MRI/MRA  -Case discussed with neurology on call who reviewed imaging and noted calcifications in the vertebral arteries that the neurologist describes as very tight   - Given ASA 81mg x1      Heart Rate:  [71-96]   Temp:  [36.3 °C (97.3 °F)-36.8 °C (98.3 °F)]   Resp:  [16-18]   BP: (126-163)/()   Height:  [177.8 cm (5' 10\")]   Weight:  [101 kg (223 lb)-102 kg (223 lb 12.8 oz)]   SpO2:  [94 %-97 %]       Outpatient Medications:  Current Outpatient Medications   Medication Instructions    apixaban (ELIQUIS) 5 mg, oral, 2 times daily    atorvastatin (LIPITOR) 80 mg, oral, Daily    blood sugar diagnostic (Accu-Chek Guide test strips) strip TEST TWICE DAILY DX CODE E11.9 NOT ON INSULIN    blood-glucose sensor (Dexcom G6 Sensor) device Apply per instructions every 10 days    " cholecalciferol (Vitamin D-3) 50 mcg (2,000 unit) capsule 1 capsule, oral, Daily    dilTIAZem ER (TIAZAC) 300 mg, oral, Daily    glimepiride (AMARYL) 2 mg, oral, 2 times daily before meals    losartan (COZAAR) 100 mg, oral, Daily    metFORMIN (GLUCOPHAGE) 500 mg, oral, 2 times daily (morning and late afternoon)    metoprolol succinate XL (TOPROL-XL) 25 mg, oral, Daily    Ozempic 0.5 mg, subcutaneous, Once Weekly    pantoprazole (PROTONIX) 40 mg, oral, Daily     Labs  CBC- 8/2/2024:  3:46 PM  5.0 14.5 222    43.5      BMP- 8/2/2024:  3:46 PM  139 19 105 80   4.5 1.32 23    Estimated Creatinine Clearance: 58.5 mL/min (A) (by C-G formula based on SCr of 1.32 mg/dL (H)).     CA: 9.0 PROTIEN: 7.0 ALT: 34 Total Bili: 0.9 Mg: _   PHOS: _ ALBUMIN: 4.0 AST: 28   Alk Phos: 113      COAGS- 8/2/2024:  3:46 PM  1.4   15.5 36     CV Labs  Troponin I, High Sensitivity   Date/Time Value Ref Range Status   08/02/2024 03:46 PM 4 0 - 20 ng/L Final     BNP   Date/Time Value Ref Range Status   08/02/2024 03:46 PM 10 0 - 99 pg/mL Final     POC HEMOGLOBIN A1c   Date/Time Value Ref Range Status   04/12/2023 10:44 AM 6.1 4.2 - 6.5 % Final     Hemoglobin A1C   Date/Time Value Ref Range Status   04/16/2024 12:27 PM 6.7 (H) see below % Final   02/21/2024 01:01 PM 6.3 (H) see below % Final     POC Non-HDL Cholesterol   Date/Time Value Ref Range Status   04/12/2023 10:43  0 - 130 mg/dl Final     POC LDL Cholesterol   Date/Time Value Ref Range Status   04/12/2023 10:43 AM 79 0 - 100 mg/dl Final     LDL Calculated   Date/Time Value Ref Range Status   10/12/2023 12:51 PM 69 <=99 mg/dL Final     VLDL   Date/Time Value Ref Range Status   10/12/2023 12:51 PM 35 0 - 40 mg/dL Final       Pertinent Imaging  XR chest 1 view 08/02/2024  No radiographic evidence of acute cardiopulmonary disease.    CT head wo IV contrast 08/02/2024  1. No acute intracranial abnormality identified.  2. Nonspecific white matter changes, likely sequela of small-vessel  ischemic disease.    MRI may be performed for further assessment as clinically warranted.    MR brain wo IV contrast 08/02/2024  No evidence of acute infarct, intracranial mass effect or midline shift. Senescent change        Mr.Thomas ANGEL Sauceda is a 73 y.o. male who present s/p episode of double vision and is admitted for TIA. PMH includes CAD, pAF/flutter on Eliquis, HTN, HTN, known severe aortic stenosis, DM, GERD, BPH, and CKD IIIa.    #TIA, vision changes- Symptoms now resolved  -NIH Stroke Scale: 0  -CTH wo acute changes  -MRI no acute infarct, intracranial mass effect, or midline shift  -Tight calcifications in the vertebral arteries per neurology  #CAD- c/w statin and Eliquis  #HTN- BP controlled on Losartan 100mg daily, Metoprolol Succinate 25mg daily, Diltiazem 300mg daily  #HLD- On high-intensity statin, LDL acceptable  #pAF/flutter- c/w Metoprolol and Diltiazem  -AC with Eliquis  #Severe Aortic Stenosis- Asymptomatic, last TTE 3/2024  #T2DM- Hold Metformin while admitted c/w home Amaryl  #CKD IIIa- Slight increased in Cr on admission, will trend  #GERD- c/w PPI      PLAN:  -c/w Eliquis and statin  -Lipid panel, HgbA1c, and BNP  -Neuro checks q4  -Continuous telemetry to monitor for arrhythmias  -Optimize glycemic control  -c/w home antihypertensives  -Neuro consulted, will see in AM        GI/VTE PPX: PPI, Eliquis  Code Status: Full Code  Disposition: Discharge home once medically cleared and stable, pending neurology recommendations

## 2024-08-03 NOTE — ED PROCEDURE NOTE
Procedure  Critical Care    Performed by: Jose Antonio Page MD  Authorized by: Jose Antonio Page MD    Critical care provider statement:     Critical care time (minutes):  35    Critical care was necessary to treat or prevent imminent or life-threatening deterioration of the following conditions: Stroke evaluation.    Critical care was time spent personally by me on the following activities:  Discussions with consultants, blood draw for specimens, development of treatment plan with patient or surrogate and examination of patient    Care discussed with: admitting provider                 Jose Antonio Page MD  08/03/24 0147

## 2024-08-03 NOTE — CARE PLAN
The patient's goals for the shift include      The clinical goals for the shift include Pt will remain free from falls. Pt will remain at neurological baseline.      Problem: Pain - Adult  Goal: Verbalizes/displays adequate comfort level or baseline comfort level  Outcome: Progressing     Problem: Safety - Adult  Goal: Free from fall injury  Outcome: Progressing     Problem: Discharge Planning  Goal: Discharge to home or other facility with appropriate resources  Outcome: Progressing     Problem: Diabetes  Goal: Maintain glucose levels >70mg/dl to <250mg/dl throughout shift  Outcome: Progressing     Problem: Fall/Injury  Goal: Not fall by end of shift  Outcome: Progressing  Goal: Be free from injury by end of the shift  Outcome: Progressing

## 2024-08-05 LAB
ATRIAL RATE: 71 BPM
P AXIS: 54 DEGREES
P OFFSET: 205 MS
P ONSET: 137 MS
PR INTERVAL: 176 MS
Q ONSET: 225 MS
QRS COUNT: 12 BEATS
QRS DURATION: 78 MS
QT INTERVAL: 408 MS
QTC CALCULATION(BAZETT): 443 MS
QTC FREDERICIA: 431 MS
R AXIS: 16 DEGREES
T AXIS: 7 DEGREES
T OFFSET: 429 MS
VENTRICULAR RATE: 71 BPM

## 2024-08-06 ENCOUNTER — TELEPHONE (OUTPATIENT)
Dept: PRIMARY CARE | Facility: CLINIC | Age: 74
End: 2024-08-06
Payer: COMMERCIAL

## 2024-08-06 ENCOUNTER — PATIENT OUTREACH (OUTPATIENT)
Dept: CARE COORDINATION | Facility: CLINIC | Age: 74
End: 2024-08-06
Payer: COMMERCIAL

## 2024-08-06 DIAGNOSIS — E11.9 TYPE 2 DIABETES MELLITUS WITHOUT COMPLICATION, WITHOUT LONG-TERM CURRENT USE OF INSULIN (MULTI): ICD-10-CM

## 2024-08-06 DIAGNOSIS — G45.9 TIA (TRANSIENT ISCHEMIC ATTACK): ICD-10-CM

## 2024-08-06 NOTE — TELEPHONE ENCOUNTER
----- Message from Kay Lucas sent at 8/6/2024  6:13 AM EDT -----  Regarding: Hospital follow up  Pt had a TIA  He should have hospital follow up with me (within 10 days of discharge)  OK  Acute

## 2024-08-06 NOTE — PROGRESS NOTES
Discharge Facility: [facility]   Discharge Diagnosis: TIA   Admission Date: 8-2-24   Discharge Date: 8-3-24    PCP Appointment Date: Message routed to office for scheduling     Specialist Appointment Date: 10-2-24 Cardio   Hospital Encounter and Summary Linked: Yes    Two attempts were made to reach patient within two business days after discharge. Voicemail left with contact information for patient to call back with any non-emergent questions or concerns.    Jimmy Chan LPN

## 2024-08-06 NOTE — TELEPHONE ENCOUNTER
----- Message from Kay Lucas sent at 8/3/2024  9:53 PM EDT -----  Regarding: Follow up  Please reach out to pt for hospital follow up.  OK for Acute.

## 2024-08-12 ENCOUNTER — APPOINTMENT (OUTPATIENT)
Dept: PRIMARY CARE | Facility: CLINIC | Age: 74
End: 2024-08-12
Payer: COMMERCIAL

## 2024-08-12 VITALS
HEIGHT: 69 IN | TEMPERATURE: 97.7 F | WEIGHT: 226 LBS | SYSTOLIC BLOOD PRESSURE: 120 MMHG | BODY MASS INDEX: 33.47 KG/M2 | DIASTOLIC BLOOD PRESSURE: 68 MMHG

## 2024-08-12 DIAGNOSIS — I10 ESSENTIAL HYPERTENSION: ICD-10-CM

## 2024-08-12 DIAGNOSIS — I48.91 ATRIAL FIBRILLATION, UNSPECIFIED TYPE (MULTI): ICD-10-CM

## 2024-08-12 DIAGNOSIS — G45.9 TIA (TRANSIENT ISCHEMIC ATTACK): ICD-10-CM

## 2024-08-12 DIAGNOSIS — Z09 HOSPITAL DISCHARGE FOLLOW-UP: Primary | ICD-10-CM

## 2024-08-12 DIAGNOSIS — H53.9 VISUAL DISTURBANCE: ICD-10-CM

## 2024-08-12 DIAGNOSIS — I35.2 AORTIC VALVE STENOSIS WITH INSUFFICIENCY, ETIOLOGY OF CARDIAC VALVE DISEASE UNSPECIFIED: ICD-10-CM

## 2024-08-12 NOTE — PROGRESS NOTES
"Subjective   Patient ID: Franky Sauceda is a 73 y.o. male who presents for ER Follow-up.    HPI     Was driving and     [facility] - to follow up      Activity  - lawn moving.    Walking a lot.    Review of Systems    Objective   /68   Temp 36.5 °C (97.7 °F)   Ht 1.753 m (5' 9\")   Wt 103 kg (226 lb)   BMI 33.37 kg/m²     Physical Exam    Assessment/Plan          "

## 2024-08-12 NOTE — PATIENT INSTRUCTIONS
Good to see you today.  Stay the course.  I will check with DDM about your glucometer.  Test in AM (GOAL )  and 2 hours from the first bite of a meal (GOAL  140 or below)   Keep follow up with Dr. Soni.  Plan to see me in 3 months with labs (fasting) before.

## 2024-08-12 NOTE — PROGRESS NOTES
"Subjective   Patient ID: Franky Sauceda is a 73 y.o. male who presents for ER Follow-up.    HPI Here for hospital follow up  See previous note and Hospital visit.  Had ~ 20-40 minutes of visual disturbance and then seemed OK   Was seen here and sent to Hospital 8/2 for further eval.  Dx with TIA    Admitted and evaluation done.  Unclear etiology.  May have been failure of Eliquis or From valve.  Hx of severe aortic stenosis  [facility]  Sugar has been OK  BP at Home has been OK  No palpitations.      Review of Systems   Constitutional:  Positive for activity change.   Eyes:  Positive for visual disturbance.   All other systems reviewed and are negative.      Objective   /68   Temp 36.5 °C (97.7 °F)   Ht 1.753 m (5' 9\")   Wt 103 kg (226 lb)   BMI 33.37 kg/m²     Physical Exam  Vitals and nursing note reviewed.   Constitutional:       Appearance: He is obese.   HENT:      Head: Normocephalic and atraumatic.      Right Ear: Tympanic membrane, ear canal and external ear normal.      Left Ear: Tympanic membrane, ear canal and external ear normal.      Mouth/Throat:      Pharynx: Oropharynx is clear.   Eyes:      Pupils: Pupils are equal, round, and reactive to light.   Neck:      Thyroid: No thyroid mass, thyromegaly or thyroid tenderness.   Cardiovascular:      Rate and Rhythm: Normal rate and regular rhythm.      Heart sounds: Murmur heard.      Comments: 4/6 Murmur  Pulmonary:      Effort: Pulmonary effort is normal.      Breath sounds: Normal breath sounds.   Abdominal:      General: Bowel sounds are normal.      Palpations: Abdomen is soft.   Musculoskeletal:      Cervical back: Normal range of motion and neck supple.   Skin:     General: Skin is warm.   Neurological:      Mental Status: He is alert and oriented to person, place, and time.   Psychiatric:         Mood and Affect: Mood normal.         Behavior: Behavior normal.         Thought Content: Thought content normal.         Judgment: " Judgment normal.         Assessment/Plan   Problem List Items Addressed This Visit             ICD-10-CM    Aortic valve stenosis with insufficiency I35.2    Atrial fibrillation (Multi) I48.91    Essential hypertension I10    Hospital discharge follow-up - Primary Z09    TIA (transient ischemic attack) G45.9    Visual disturbance H53.9   Keep follow up with Cardiology & [facility]

## 2024-08-14 DIAGNOSIS — E11.9 TYPE 2 DIABETES MELLITUS WITHOUT COMPLICATION, WITHOUT LONG-TERM CURRENT USE OF INSULIN (MULTI): Primary | ICD-10-CM

## 2024-08-14 RX ORDER — LANCETS
200 EACH MISCELLANEOUS 2 TIMES DAILY
COMMUNITY
End: 2024-08-14 | Stop reason: SDUPTHER

## 2024-08-14 RX ORDER — CALCIUM CITRATE/VITAMIN D3 200MG-6.25
200 TABLET ORAL 2 TIMES DAILY
COMMUNITY
End: 2024-08-14 | Stop reason: SDUPTHER

## 2024-08-14 RX ORDER — INSULIN PUMP SYRINGE, 3 ML
1 EACH MISCELLANEOUS AS NEEDED
COMMUNITY

## 2024-08-14 NOTE — TELEPHONE ENCOUNTER
----- Message from Kay Lucas sent at 8/14/2024  4:23 PM EDT -----  Regarding: Test strips  Please update med list and pend Rx to test once daily

## 2024-08-14 NOTE — TELEPHONE ENCOUNTER
Pt calling to let you know he has a True Metrix Glucometer, so he need True Metrix lancets and Test Strips, DDM in [place].

## 2024-08-15 RX ORDER — LANCETS
100 EACH MISCELLANEOUS DAILY
Qty: 100 EACH | Refills: 3 | Status: SHIPPED | OUTPATIENT
Start: 2024-08-15

## 2024-08-15 RX ORDER — CALCIUM CITRATE/VITAMIN D3 200MG-6.25
100 TABLET ORAL DAILY
Qty: 100 STRIP | Refills: 3 | Status: SHIPPED | OUTPATIENT
Start: 2024-08-15

## 2024-08-21 ENCOUNTER — TELEPHONE (OUTPATIENT)
Dept: CARDIOLOGY | Facility: CLINIC | Age: 74
End: 2024-08-21
Payer: COMMERCIAL

## 2024-08-21 ENCOUNTER — PATIENT OUTREACH (OUTPATIENT)
Dept: CARE COORDINATION | Facility: CLINIC | Age: 74
End: 2024-08-21
Payer: COMMERCIAL

## 2024-08-21 NOTE — PROGRESS NOTES
Call regarding appt. with PCP on (  8-12-24 ) after hospitalization.  At time of outreach call the patient feels as if their condition has improved since last visit.  Reviewed the PCP appointment with the pt and addressed any questions or concerns.    Pt. States no questions/concerns at this time.     Jimmy Chan LPN

## 2024-08-28 ENCOUNTER — OFFICE VISIT (OUTPATIENT)
Dept: CARDIOLOGY | Facility: CLINIC | Age: 74
End: 2024-08-28
Payer: COMMERCIAL

## 2024-08-28 VITALS
HEART RATE: 60 BPM | SYSTOLIC BLOOD PRESSURE: 134 MMHG | WEIGHT: 226 LBS | DIASTOLIC BLOOD PRESSURE: 66 MMHG | BODY MASS INDEX: 33.37 KG/M2

## 2024-08-28 DIAGNOSIS — G45.9 TIA (TRANSIENT ISCHEMIC ATTACK): ICD-10-CM

## 2024-08-28 DIAGNOSIS — I35.0 SEVERE AORTIC STENOSIS: Primary | ICD-10-CM

## 2024-08-28 DIAGNOSIS — I10 PRIMARY HYPERTENSION: ICD-10-CM

## 2024-08-28 DIAGNOSIS — I25.10 CORONARY ARTERY DISEASE INVOLVING NATIVE CORONARY ARTERY OF NATIVE HEART WITHOUT ANGINA PECTORIS: ICD-10-CM

## 2024-08-28 DIAGNOSIS — E78.2 MIXED HYPERLIPIDEMIA: ICD-10-CM

## 2024-08-28 DIAGNOSIS — I48.0 PAF (PAROXYSMAL ATRIAL FIBRILLATION) (MULTI): ICD-10-CM

## 2024-08-28 PROCEDURE — 3048F LDL-C <100 MG/DL: CPT | Performed by: INTERNAL MEDICINE

## 2024-08-28 PROCEDURE — 3044F HG A1C LEVEL LT 7.0%: CPT | Performed by: INTERNAL MEDICINE

## 2024-08-28 PROCEDURE — 4010F ACE/ARB THERAPY RXD/TAKEN: CPT | Performed by: INTERNAL MEDICINE

## 2024-08-28 PROCEDURE — 99215 OFFICE O/P EST HI 40 MIN: CPT | Performed by: INTERNAL MEDICINE

## 2024-08-28 PROCEDURE — 1123F ACP DISCUSS/DSCN MKR DOCD: CPT | Performed by: INTERNAL MEDICINE

## 2024-08-28 PROCEDURE — 1036F TOBACCO NON-USER: CPT | Performed by: INTERNAL MEDICINE

## 2024-08-28 PROCEDURE — 1159F MED LIST DOCD IN RCRD: CPT | Performed by: INTERNAL MEDICINE

## 2024-08-28 PROCEDURE — 3075F SYST BP GE 130 - 139MM HG: CPT | Performed by: INTERNAL MEDICINE

## 2024-08-28 PROCEDURE — 3078F DIAST BP <80 MM HG: CPT | Performed by: INTERNAL MEDICINE

## 2024-08-28 PROCEDURE — 1160F RVW MEDS BY RX/DR IN RCRD: CPT | Performed by: INTERNAL MEDICINE

## 2024-08-28 NOTE — PROGRESS NOTES
Chief Complaint:   Atrial Fibrillation     History of Present Illness     Franky Sauceda is a 73 y.o. male presenting with for routine follow-up of paroxysmal atrial fibrillation.  The patient initially presented with symptoms of palpitations.  The patient has been maintaining sinus rhythm on medical treatment which they are tolerating well and are compliant.  The current treatment strategy is rhythm control.  The CHADS2-VASC2 score is 5  and the ACC/AHA guidelines recommend anticoagulation for stroke prophylaxis.  The patient is being treated with Eliquis and has tolerated treatment with no bleeding and is compliant.  Had TIA on 8/2/24.  Denies CP, HERNDON or palpitations or syncope.  Vision disturbance transient 20-40 mins w negative retinal exam and no CVA.    Review of Systems  All pertinent systems have been reviewed and are negative except for what is stated in the history of present illness.    All other systems have been reviewed and are negative and noncontributory to this patient's current ailments.         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), Coronary artery disease involving native coronary artery of native heart without angina pectoris (04/02/2024), Dorsalgia, unspecified (09/27/2016), Encounter for immunization (06/29/2018), Localized edema (07/07/2022), Moderate aortic stenosis (04/02/2024), PAF (paroxysmal atrial fibrillation) (Multi) (04/02/2024), Pain in left knee (12/08/2021), Pain in unspecified knee (04/28/2020), Personal history of other diseases of the circulatory system, Personal history of other diseases of urinary system (07/07/2022), Personal history of other infectious and parasitic diseases (05/14/2018), Severe aortic stenosis (04/02/2024), Sprain of other specified parts of left knee, initial encounter (01/02/2020), and Unilateral primary osteoarthritis, left knee (08/20/2019).    Past Surgical History:  He has  a past surgical history that includes Colonoscopy (01/28/2014); Esophagogastroduodenoscopy (01/28/2014); and Cholecystectomy (01/28/2014).      Social History:  He reports that he has never smoked. He has never used smokeless tobacco. He reports that he does not currently use alcohol. He reports that he does not use drugs.    Family History:  Family History   Problem Relation Name Age of Onset    Hypertension Mother      Other (abdominal aortic aneurysm) Father      Hyperlipidemia Father      Hypertension Father          Allergies:  Cerivastatin, Jardiance [empagliflozin], Nifedipine, Statins-hmg-coa reductase inhibitors, and Icosapent ethyl    Outpatient Medications:  Current Outpatient Medications   Medication Instructions    apixaban (ELIQUIS) 5 mg, oral, 2 times daily    atorvastatin (LIPITOR) 80 mg, oral, Daily    Blood glucose monitoring meter kit kit 1 each, miscellaneous, As needed    blood sugar diagnostic (True Metrix Glucose Test Strip) strip 100 strips, miscellaneous, Daily    cholecalciferol (Vitamin D-3) 50 mcg (2,000 unit) capsule 1 capsule, oral, Daily    dilTIAZem ER (TIAZAC) 300 mg, oral, Daily    glimepiride (AMARYL) 2 mg, oral, 2 times daily before meals    lancets misc 100 Lancets, miscellaneous, Daily    losartan (COZAAR) 100 mg, oral, Daily    metFORMIN (GLUCOPHAGE) 500 mg, oral, 2 times daily (morning and late afternoon)    metoprolol succinate XL (TOPROL-XL) 25 mg, oral, Daily    Ozempic 0.5 mg, subcutaneous, Once Weekly    pantoprazole (PROTONIX) 40 mg, oral, Daily       Physical Examination   Vitals:  Visit Vitals  /66 (BP Location: Right arm, Patient Position: Sitting, BP Cuff Size: Large adult)   Pulse 60   Wt 103 kg (226 lb)   BMI 33.37 kg/m²   Smoking Status Never   BSA 2.24 m²    Physical Exam  Vitals reviewed.   Constitutional:       General: He is not in acute distress.     Appearance: Normal appearance.   HENT:      Head: Normocephalic and atraumatic.      Nose: Nose normal.    Eyes:      Conjunctiva/sclera: Conjunctivae normal.   Cardiovascular:      Rate and Rhythm: Normal rate and regular rhythm.      Pulses: Normal pulses.      Heart sounds: Murmur heard.      Systolic murmur is present with a grade of 4/6.   Pulmonary:      Effort: Pulmonary effort is normal. No respiratory distress.      Breath sounds: Normal breath sounds. No wheezing, rhonchi or rales.   Abdominal:      General: Bowel sounds are normal. There is no distension.      Palpations: Abdomen is soft.      Tenderness: There is no abdominal tenderness.   Musculoskeletal:         General: No swelling.      Right lower leg: No edema.      Left lower leg: No edema.   Skin:     General: Skin is warm and dry.      Capillary Refill: Capillary refill takes less than 2 seconds.   Neurological:      General: No focal deficit present.      Mental Status: He is alert.   Psychiatric:         Mood and Affect: Mood normal.             Labs/Imaging/Cardiac Studies     Last Labs:  CBC -  Lab Results   Component Value Date    WBC 4.6 08/03/2024    HGB 13.3 (L) 08/03/2024    HCT 40.0 (L) 08/03/2024    MCV 91 08/03/2024     08/03/2024       CMP -  Lab Results   Component Value Date    CALCIUM 8.2 (L) 08/03/2024    PROT 7.0 08/02/2024    ALBUMIN 4.0 08/02/2024    AST 28 08/02/2024    ALT 34 08/02/2024    ALKPHOS 113 08/02/2024    BILITOT 0.9 08/02/2024       LIPID PANEL -   Lab Results   Component Value Date    CHOL 97 08/03/2024    CHOL 153 04/12/2023    HDL 24.3 08/03/2024    HDL 36 04/12/2023    CHHDL 4.0 08/03/2024    CHHDL 4.2 04/12/2023     04/12/2023    VLDL 25 08/03/2024    TRIG 124 08/03/2024    TRIG 188 (A) 04/12/2023    NHDL 73 08/03/2024       RENAL FUNCTION PANEL -   Lab Results   Component Value Date    K 4.0 08/03/2024       Lab Results   Component Value Date    BNP 10 08/02/2024    HGBA1C 5.7 (H) 08/02/2024    HGBA1C 6.1 04/12/2023       ECG:    Echo:  Transthoracic Echo (TTE) Complete    Result Date: 4/3/2024         [facility] [place], [place] [address]        Tel [phone] and Fax [phone] TRANSTHORACIC ECHOCARDIOGRAM REPORT  Patient Name:      ALISHA ORTIZ DANIEL You Physician:    [id number] Kaylie Soni MD Study Date:        4/2/2024             Ordering Provider:    [id number] KAYLIE SONI MRN/PID:           [id number]             Fellow: Accession#:        [id number]         Nurse: Date of Birth/Age: 1950 / 73 years Sonographer:          Kiera Og RD Gender:            M                    Additional Staff: Height:            180.34 cm            Admit Date:           4/2/2024 Weight:            105.23 kg            Admission Status:     Outpatient BSA / BMI:         2.25 m2 / 32.36      Encounter#:           [id number]                    kg/m2                                         Department Location:  [facility] Blood Pressure: 136 /57 mmHg Study Type:    TRANSTHORACIC ECHO (TTE) COMPLETE Diagnosis/ICD: Nonrheumatic aortic (valve) stenosis with insufficiency-I35.2 Indication:    AS/AI CPT Code:      Echo Complete w Full [id number]  Study Detail: The following Echo studies were performed: 2D, M-Mode, Doppler and               color flow. Patient's heart rhythm is normal sinus rhythm. A               bubble study was not performed.  PHYSICIAN INTERPRETATION: Left Ventricle: The left ventricular systolic function is hyperdynamic. There are no regional wall motion abnormalities. The left ventricular cavity size is normal. Spectral Doppler shows an impaired relaxation pattern of left ventricular diastolic filling. Left Atrium: The left atrium is normal in size. Right Ventricle: The right ventricle is normal in size. There is normal right ventricular global systolic function. Right Atrium: The right atrium is normal in size. Aortic Valve:  The aortic valve is trileaflet. There is severe aortic valve cusp calcification. There is severe aortic valve thickening. There is There is reduced systolic aortic valve leaflet excursion. There is evidence of severe aortic valve stenosis. There is mild aortic valve regurgitation. The peak instantaneous gradient of the aortic valve is 82.2 mmHg. The mean gradient of the aortic valve is 41.5 mmHg. Mitral Valve: The mitral valve is mildly thickened. There is mild mitral annular calcification. There is trace mitral valve regurgitation. Tricuspid Valve: The tricuspid valve is structurally normal. There is mild tricuspid regurgitation. Pulmonic Valve: The pulmonic valve is structurally normal. There is physiologic pulmonic valve regurgitation. Pericardium: There is no pericardial effusion noted. Aorta: The aortic root is normal. In comparison to the previous echocardiogram(s): Compared with study from 3/30/2023, AS has progressed.  CONCLUSIONS:  1. Left ventricular systolic function is hyperdynamic.  2. Spectral Doppler shows an impaired relaxation pattern of left ventricular diastolic filling.  3. Severe aortic valve stenosis.  4. There is severe aortic valve cusp calcification.  5. There is severe aortic valve thickening.  6. Mild aortic valve regurgitation. QUANTITATIVE DATA SUMMARY: 2D MEASUREMENTS:                          Normal Ranges: LAs:           3.67 cm   (2.7-4.0cm) RVIDd:         1.84 cm   (0.9-3.6cm) IVSd:          1.21 cm   (0.6-1.1cm) LVPWd:         1.19 cm   (0.6-1.1cm) LVIDd:         4.22 cm   (3.9-5.9cm) LVIDs:         2.76 cm LV Mass Index: 79.9 g/m2 LV % FS        34.7 % LA VOLUME:                               Normal Ranges: LA Vol A4C:        54.5 ml    (22+/-6mL/m2) LA Vol A2C:        62.2 ml LA Vol BP:         58.7 ml LA Vol Index A4C:  24.3 ml/m2 LA Vol Index A2C:  27.7 ml/m2 LA Vol Index BP:   26.1 ml/m2 LA Area A4C:       20.1 cm2 LA Area A2C:       21.3 cm2 LA Major Axis A4C: 6.3 cm LA Major  Axis A2C: 6.2 cm LA Vol A4C:        59.5 ml LA Vol A2C:        59.2 ml RA VOLUME BY A/L METHOD:                               Normal Ranges: RA Vol A4C:        36.3 ml    (8.3-19.5ml) RA Vol Index A4C:  16.1 ml/m2 RA Area A4C:       16.0 cm2 RA Major Axis A4C: 6.0 cm LV SYSTOLIC FUNCTION BY 2D PLANIMETRY (MOD):                     Normal Ranges: EF-A4C View: 74.3 % (>=55%) EF-A2C View: 64.5 % EF-Biplane:  69.7 % LV DIASTOLIC FUNCTION:                           Normal Ranges: MV Peak E:    0.84 m/s    (0.7-1.2 m/s) MV Peak A:    1.05 m/s    (0.42-0.7 m/s) E/A Ratio:    0.80        (1.0-2.2) MV e'         0.08 m/s    (>8.0) MV lateral e' 0.08 m/s MV medial e'  0.08 m/s MV A Dur:     121.79 msec E/e' Ratio:   10.48       (<8.0) MITRAL VALVE:                 Normal Ranges: MV DT: 314 msec (150-240msec) AORTIC VALVE:                                    Normal Ranges: AoV Vmax:                4.53 m/s  (<=1.7m/s) AoV Peak P.2 mmHg (<20mmHg) AoV Mean P.5 mmHg (1.7-11.5mmHg) LVOT Max Yon:            1.46 m/s  (<=1.1m/s) AoV VTI:                 90.90 cm  (18-25cm) LVOT VTI:                29.88 cm LVOT Diameter:           2.10 cm   (1.8-2.4cm) AoV Area, VTI:           1.14 cm2  (2.5-5.5cm2) AoV Area,Vmax:           1.11 cm2  (2.5-4.5cm2) AoV Dimensionless Index: 0.33  RIGHT VENTRICLE: RV Basal 2.90 cm RV Major 6.8 cm TAPSE:   21.0 mm RV s'    0.16 m/s TRICUSPID VALVE/RVSP:                             Normal Ranges: Peak TR Velocity: 2.67 m/s Est. RA Pressure: 3 mmHg RV Syst Pressure: 31.4 mmHg (< 30mmHg) PULMONIC VALVE:                         Normal Ranges: PV Accel Time: 80 msec  (>120ms) PV Max Yon:    1.2 m/s  (0.6-0.9m/s) PV Max P.4 mmHg AORTA: Asc Ao Diam 3.48 cm  [id number] Loy Soni MD Electronically signed on 4/3/2024 at 9:27:57 AM  ** Final **         Assessment and Recommendations     Assessment/Plan   1. Severe aortic stenosis  The patient has severe aortic stenosis and  remains asymptomatic.  There is no indication for AVR.  The patient will continue to have serial annual echocardiography and was counseled on the expected symptoms related to aortic stenosis.  Weight lifting restrictions reviewed. I discussed going ahead with AVR as the TIA could have been embolic and AVR is inevitable even though ASX. He prefers to wait another year if he remains ASX.     2. Primary hypertension  The patient's blood pressure has been well-controlled at today's appointment or by recent primary care provider's measurements/home measurements and meets their goal blood pressure per the ACC/AHA guidelines.  The patient has been compliant with their anti-hypertensive medications and is following a low sodium/DASH diet. I advised continuation of their present medical treatment and lifestyle modification.      3. PAF (paroxysmal atrial fibrillation) (Multi)  The patient has been clinically stable, asymptomatic, and maintaining normal sinus rhythm.  They will continue treatment with rate-controlling medications and anticoagulation for stroke prophylaxis based upon their present [id number] score, the risks and benefits of which were discussed with the patient/family/caregiver.     4. Mixed hyperlipidemia  The patient's lipids are well controlled on chronic statin therapy and they are meeting their goal LDL cholesterol per the ACC/AHA guidelines.      5. TIA (transient ischemic attack)  Unclear etiology - from valve (embolic) or failure of Eliquis.     6. Coronary artery disease involving native coronary artery of native heart without angina pectoris  The patient's CAD, as detailed in the HPI, has been clinically stable, without any anginal symptoms or dyspnea.  The patient will continue treatment with guideline-directed medical therapy with statin medications and was advised regular exercise and a heart healthy diet.         Franky Sauceda will return in 6 months for an office visit with  Echo.         Loy Soni MD    Exclusive of any other services or procedures performed, I, Loy Soni MD , spent 30 minutes in duration for this visit today.  This time consisted of chart review, obtaining history, and/or performing the exam as documented above as well as documenting the clinical information for the encounter in the electronic record, discussing treatment options, plans, and/or goals with patient, family, and/or caregiver, refilling medications, updating the electronic record, ordering medicines, lab work, imaging, referrals, and/or procedures as documented above and communicating with other [facility] professionals. I have discussed the results of laboratory, radiology, and cardiology studies with the patient and their family/caregiver.

## 2024-08-31 PROBLEM — R53.83 FATIGUE: Status: RESOLVED | Noted: 2023-04-10 | Resolved: 2024-08-31

## 2024-08-31 PROBLEM — H53.9 VISUAL DISTURBANCE: Status: ACTIVE | Noted: 2024-08-31

## 2024-08-31 ASSESSMENT — ENCOUNTER SYMPTOMS: ACTIVITY CHANGE: 1

## 2024-09-03 DIAGNOSIS — E11.9 TYPE 2 DIABETES MELLITUS WITHOUT COMPLICATION, WITHOUT LONG-TERM CURRENT USE OF INSULIN (MULTI): ICD-10-CM

## 2024-09-03 NOTE — TELEPHONE ENCOUNTER
----- Message from Kay Lucas sent at 8/31/2024  2:38 PM EDT -----  Regarding: Glucometer  Please check with DDM as to which glucometer he has gotten most recently   Then Pend and send strips for testing BID  THANKS

## 2024-09-05 RX ORDER — CALCIUM CITRATE/VITAMIN D3 200MG-6.25
100 TABLET ORAL DAILY
Qty: 100 STRIP | Refills: 3 | Status: SHIPPED | OUTPATIENT
Start: 2024-09-05

## 2024-09-09 ENCOUNTER — PATIENT OUTREACH (OUTPATIENT)
Dept: PRIMARY CARE | Facility: CLINIC | Age: 74
End: 2024-09-09
Payer: COMMERCIAL

## 2024-09-12 ENCOUNTER — TELEMEDICINE (OUTPATIENT)
Dept: NEUROLOGY | Facility: CLINIC | Age: 74
End: 2024-09-12
Payer: COMMERCIAL

## 2024-09-12 DIAGNOSIS — G45.9 TIA (TRANSIENT ISCHEMIC ATTACK): ICD-10-CM

## 2024-09-12 PROCEDURE — 1123F ACP DISCUSS/DSCN MKR DOCD: CPT | Performed by: PSYCHIATRY & NEUROLOGY

## 2024-09-12 PROCEDURE — 3044F HG A1C LEVEL LT 7.0%: CPT | Performed by: PSYCHIATRY & NEUROLOGY

## 2024-09-12 PROCEDURE — 1160F RVW MEDS BY RX/DR IN RCRD: CPT | Performed by: PSYCHIATRY & NEUROLOGY

## 2024-09-12 PROCEDURE — 4010F ACE/ARB THERAPY RXD/TAKEN: CPT | Performed by: PSYCHIATRY & NEUROLOGY

## 2024-09-12 PROCEDURE — 1036F TOBACCO NON-USER: CPT | Performed by: PSYCHIATRY & NEUROLOGY

## 2024-09-12 PROCEDURE — 1159F MED LIST DOCD IN RCRD: CPT | Performed by: PSYCHIATRY & NEUROLOGY

## 2024-09-12 PROCEDURE — 3048F LDL-C <100 MG/DL: CPT | Performed by: PSYCHIATRY & NEUROLOGY

## 2024-09-12 PROCEDURE — 99214 OFFICE O/P EST MOD 30 MIN: CPT | Performed by: PSYCHIATRY & NEUROLOGY

## 2024-09-12 PROCEDURE — 99214 OFFICE O/P EST MOD 30 MIN: CPT | Mod: 95 | Performed by: PSYCHIATRY & NEUROLOGY

## 2024-09-12 ASSESSMENT — ACTIVITIES OF DAILY LIVING (ADL)
TOTAL_SCORE: 100
FEEDING: INDEPENDENT
DRESSING: INDEPENDENT (INCLUDING BUTTONS, ZIPS, LACES,ETC.)
BED_TO_CHAIR_AND_BACK: INDEPENDENT
BLADDER: CONTINENT
STAIRS: INDEPENDENT
BATHING: INDEPENDENT (OR IN SHOWER)
MOBILITY_LEVEL_SURFACES: INDEPENDENT (BUT MAY USE ANY AID FOR EXAMPLE, STICK) > 50 YARDS
TOILET_USE: INDEPENDENT (ON AND OFF, DRESSING, WIPING)
GROOMING: INDEPENDENT FACE/HAIR/TEETH.SHAVING (IMPLEMENTS PROVIDED)
BOWELS: INDEPENDENT (INCLUDING BUTTONS, ZIPS, LACES, ETC.)
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
## 2024-10-02 ENCOUNTER — APPOINTMENT (OUTPATIENT)
Dept: CARDIOLOGY | Facility: CLINIC | Age: 74
End: 2024-10-02
Payer: COMMERCIAL

## 2024-10-09 DIAGNOSIS — E11.9 TYPE 2 DIABETES MELLITUS WITHOUT COMPLICATION, WITHOUT LONG-TERM CURRENT USE OF INSULIN (MULTI): ICD-10-CM

## 2024-10-11 NOTE — TELEPHONE ENCOUNTER
----- Message from Kay Lucas sent at 10/10/2024  5:08 AM EDT -----  Regarding: labs  There is an apt next week - we can discuss Rx at apt also....  AND  he should do fasting labs before coming in

## 2024-10-11 NOTE — TELEPHONE ENCOUNTER
Spoke to pt he is on the 0.5mg of the Ozempic, he has been on it for a year and a half states he is doing very well on it. I let him know there is a lab order in for him to do labs before his apt on 10/16/2024.

## 2024-10-12 RX ORDER — SEMAGLUTIDE 0.68 MG/ML
0.5 INJECTION, SOLUTION SUBCUTANEOUS
Qty: 9 ML | Refills: 0 | Status: SHIPPED | OUTPATIENT
Start: 2024-10-13

## 2024-10-14 ENCOUNTER — LAB (OUTPATIENT)
Dept: LAB | Facility: LAB | Age: 74
End: 2024-10-14
Payer: COMMERCIAL

## 2024-10-14 DIAGNOSIS — R35.1 NOCTURIA: ICD-10-CM

## 2024-10-14 DIAGNOSIS — I10 ESSENTIAL HYPERTENSION: ICD-10-CM

## 2024-10-14 DIAGNOSIS — E78.2 MIXED HYPERLIPIDEMIA: ICD-10-CM

## 2024-10-14 DIAGNOSIS — E55.9 VITAMIN D DEFICIENCY: ICD-10-CM

## 2024-10-14 DIAGNOSIS — E11.9 TYPE 2 DIABETES MELLITUS WITHOUT COMPLICATION, WITHOUT LONG-TERM CURRENT USE OF INSULIN (MULTI): ICD-10-CM

## 2024-10-14 LAB
25(OH)D3 SERPL-MCNC: 41 NG/ML (ref 30–100)
BASOPHILS # BLD AUTO: 0.02 X10*3/UL (ref 0–0.1)
BASOPHILS NFR BLD AUTO: 0.2 %
CHOLEST SERPL-MCNC: 128 MG/DL (ref 0–199)
CHOLESTEROL/HDL RATIO: 3.4
EOSINOPHIL # BLD AUTO: 0.13 X10*3/UL (ref 0–0.4)
EOSINOPHIL NFR BLD AUTO: 1.6 %
ERYTHROCYTE [DISTWIDTH] IN BLOOD BY AUTOMATED COUNT: 14.6 % (ref 11.5–14.5)
HCT VFR BLD AUTO: 41 % (ref 41–52)
HDLC SERPL-MCNC: 37.2 MG/DL
HGB BLD-MCNC: 13.5 G/DL (ref 13.5–17.5)
IMM GRANULOCYTES # BLD AUTO: 0.02 X10*3/UL (ref 0–0.5)
IMM GRANULOCYTES NFR BLD AUTO: 0.2 % (ref 0–0.9)
LDLC SERPL CALC-MCNC: 65 MG/DL
LYMPHOCYTES # BLD AUTO: 2.98 X10*3/UL (ref 0.8–3)
LYMPHOCYTES NFR BLD AUTO: 36.5 %
MCH RBC QN AUTO: 30.8 PG (ref 26–34)
MCHC RBC AUTO-ENTMCNC: 32.9 G/DL (ref 32–36)
MCV RBC AUTO: 94 FL (ref 80–100)
MONOCYTES # BLD AUTO: 0.78 X10*3/UL (ref 0.05–0.8)
MONOCYTES NFR BLD AUTO: 9.5 %
NEUTROPHILS # BLD AUTO: 4.24 X10*3/UL (ref 1.6–5.5)
NEUTROPHILS NFR BLD AUTO: 52 %
NON HDL CHOLESTEROL: 91 MG/DL (ref 0–149)
NRBC BLD-RTO: 0 /100 WBCS (ref 0–0)
PLATELET # BLD AUTO: 237 X10*3/UL (ref 150–450)
PSA SERPL-MCNC: 1.39 NG/ML
RBC # BLD AUTO: 4.38 X10*6/UL (ref 4.5–5.9)
TRIGL SERPL-MCNC: 130 MG/DL (ref 0–149)
TSH SERPL-ACNC: 2.36 MIU/L (ref 0.44–3.98)
VLDL: 26 MG/DL (ref 0–40)
WBC # BLD AUTO: 8.2 X10*3/UL (ref 4.4–11.3)

## 2024-10-14 PROCEDURE — 83036 HEMOGLOBIN GLYCOSYLATED A1C: CPT

## 2024-10-14 PROCEDURE — 82306 VITAMIN D 25 HYDROXY: CPT

## 2024-10-14 PROCEDURE — 85025 COMPLETE CBC W/AUTO DIFF WBC: CPT

## 2024-10-14 PROCEDURE — 80061 LIPID PANEL: CPT

## 2024-10-14 PROCEDURE — 36415 COLL VENOUS BLD VENIPUNCTURE: CPT

## 2024-10-14 PROCEDURE — 84153 ASSAY OF PSA TOTAL: CPT

## 2024-10-14 PROCEDURE — 84443 ASSAY THYROID STIM HORMONE: CPT

## 2024-10-15 LAB
EST. AVERAGE GLUCOSE BLD GHB EST-MCNC: 126 MG/DL
HBA1C MFR BLD: 6 %

## 2024-10-16 ENCOUNTER — APPOINTMENT (OUTPATIENT)
Dept: PRIMARY CARE | Facility: CLINIC | Age: 74
End: 2024-10-16
Payer: COMMERCIAL

## 2024-10-16 VITALS
HEIGHT: 69 IN | WEIGHT: 228 LBS | SYSTOLIC BLOOD PRESSURE: 130 MMHG | DIASTOLIC BLOOD PRESSURE: 80 MMHG | TEMPERATURE: 97.3 F | BODY MASS INDEX: 33.77 KG/M2

## 2024-10-16 DIAGNOSIS — H53.9 VISUAL DISTURBANCE: ICD-10-CM

## 2024-10-16 DIAGNOSIS — E11.9 TYPE 2 DIABETES MELLITUS WITHOUT COMPLICATION, WITHOUT LONG-TERM CURRENT USE OF INSULIN (MULTI): Primary | ICD-10-CM

## 2024-10-16 DIAGNOSIS — Z23 NEED FOR IMMUNIZATION AGAINST INFLUENZA: ICD-10-CM

## 2024-10-16 PROCEDURE — 3048F LDL-C <100 MG/DL: CPT | Performed by: NURSE PRACTITIONER

## 2024-10-16 PROCEDURE — 1123F ACP DISCUSS/DSCN MKR DOCD: CPT | Performed by: NURSE PRACTITIONER

## 2024-10-16 PROCEDURE — 99214 OFFICE O/P EST MOD 30 MIN: CPT | Performed by: NURSE PRACTITIONER

## 2024-10-16 PROCEDURE — 1159F MED LIST DOCD IN RCRD: CPT | Performed by: NURSE PRACTITIONER

## 2024-10-16 PROCEDURE — 3079F DIAST BP 80-89 MM HG: CPT | Performed by: NURSE PRACTITIONER

## 2024-10-16 PROCEDURE — 4010F ACE/ARB THERAPY RXD/TAKEN: CPT | Performed by: NURSE PRACTITIONER

## 2024-10-16 PROCEDURE — 3044F HG A1C LEVEL LT 7.0%: CPT | Performed by: NURSE PRACTITIONER

## 2024-10-16 PROCEDURE — 3075F SYST BP GE 130 - 139MM HG: CPT | Performed by: NURSE PRACTITIONER

## 2024-10-16 PROCEDURE — 90471 IMMUNIZATION ADMIN: CPT | Performed by: NURSE PRACTITIONER

## 2024-10-16 PROCEDURE — 90662 IIV NO PRSV INCREASED AG IM: CPT | Performed by: NURSE PRACTITIONER

## 2024-10-16 RX ORDER — ASPIRIN 81 MG/1
81 TABLET ORAL 2 TIMES DAILY
COMMUNITY

## 2024-10-16 RX ORDER — METFORMIN HYDROCHLORIDE 500 MG/1
1000 TABLET ORAL 2 TIMES DAILY
Qty: 360 TABLET | Refills: 3 | Status: SHIPPED | OUTPATIENT
Start: 2024-10-16

## 2024-10-16 RX ORDER — SEMAGLUTIDE 0.68 MG/ML
0.5 INJECTION, SOLUTION SUBCUTANEOUS
Qty: 9 ML | Refills: 3 | Status: SHIPPED | OUTPATIENT
Start: 2024-10-20

## 2024-10-16 ASSESSMENT — PATIENT HEALTH QUESTIONNAIRE - PHQ9
1. LITTLE INTEREST OR PLEASURE IN DOING THINGS: NOT AT ALL
SUM OF ALL RESPONSES TO PHQ9 QUESTIONS 1 AND 2: 0
2. FEELING DOWN, DEPRESSED OR HOPELESS: NOT AT ALL

## 2024-10-16 NOTE — PATIENT INSTRUCTIONS
Good to see you today.  Stop Glimepiride  Increase the Metformin to 2 pills twice daily  TAKE WITH FOOD  Continue with .5 mg of Ozempic once weekly.  NON-FASTING labs in 3 months and I will message you with the result.  Plan to see me in 6 months after fasting labs. Labs:  Fast 8 hours/hydrate well/NO SUPPLEMENTS the day of the test.  Lab hours in [place]:  6:30-4 M-F   High dose Flu today  Plan to get the RSV vaccine at the pharmacy.

## 2024-10-16 NOTE — PROGRESS NOTES
"Subjective   Patient ID: Franky Sauceda is a 74 y.o. male who presents for Follow-up (Review labs, med refill).    HPI   Was seen at ER and then admitted.  [facility]  Still has some tests regarding to valve pending.  Checking sugar AM:  60's 110-120   \"Sometimes numbers are wacky\"   OPHTH  [facility]  Said pictures look pretty good and no damage to eyes  DENTAL - due to go  Weight has come down on Ozempic.  No further visual disturbance since prior to ER    Review of Systems   Constitutional:  Positive for activity change.   Eyes:  Negative for visual disturbance.   Endocrine: Negative for polydipsia, polyphagia and polyuria.   All other systems reviewed and are negative.      Objective   /80   Temp 36.3 °C (97.3 °F)   Ht 1.753 m (5' 9\")   Wt 103 kg (228 lb)   BMI 33.67 kg/m²     Physical Exam  Vitals and nursing note reviewed.   Constitutional:       Appearance: He is obese.   HENT:      Head: Normocephalic and atraumatic.      Right Ear: Tympanic membrane, ear canal and external ear normal.      Left Ear: Tympanic membrane, ear canal and external ear normal.      Mouth/Throat:      Pharynx: Oropharynx is clear.   Eyes:      Pupils: Pupils are equal, round, and reactive to light.   Neck:      Thyroid: No thyroid mass, thyromegaly or thyroid tenderness.   Cardiovascular:      Pulses: Normal pulses.      Heart sounds: Normal heart sounds.   Pulmonary:      Effort: Pulmonary effort is normal.   Abdominal:      General: Bowel sounds are normal.      Palpations: Abdomen is soft.   Neurological:      Mental Status: He is alert and oriented to person, place, and time.   Psychiatric:         Mood and Affect: Mood normal.         Behavior: Behavior normal.         Thought Content: Thought content normal.         Judgment: Judgment normal.         Assessment/Plan   Assessment & Plan  Need for immunization against influenza    Orders:    Flu vaccine, trivalent, preservative free, HIGH-DOSE, age 65y+ " (Fluzone)    Type 2 diabetes mellitus without complication, without long-term current use of insulin (Multi)    Orders:    semaglutide (Ozempic) 0.25 mg or 0.5 mg (2 mg/3 mL) pen injector; Inject 0.5 mg under the skin 1 (one) time per week.    metFORMIN (Glucophage) 500 mg tablet; Take 2 tablets (1,000 mg) by mouth 2 times a day.    Hemoglobin A1C; Future    Visual disturbance  resolved

## 2024-11-13 ENCOUNTER — PATIENT OUTREACH (OUTPATIENT)
Dept: PRIMARY CARE | Facility: CLINIC | Age: 74
End: 2024-11-13
Payer: COMMERCIAL

## 2024-11-13 NOTE — PROGRESS NOTES
Final call back to assess needs post discharge. Left voicemail for patient. Contact information provided.

## 2024-11-15 ENCOUNTER — APPOINTMENT (OUTPATIENT)
Dept: PRIMARY CARE | Facility: CLINIC | Age: 74
End: 2024-11-15
Payer: COMMERCIAL

## 2024-11-30 PROBLEM — Z23 NEED FOR IMMUNIZATION AGAINST INFLUENZA: Status: ACTIVE | Noted: 2024-11-30

## 2024-11-30 ASSESSMENT — ENCOUNTER SYMPTOMS
POLYDIPSIA: 0
ACTIVITY CHANGE: 1
POLYPHAGIA: 0

## 2024-11-30 NOTE — ASSESSMENT & PLAN NOTE
Orders:    semaglutide (Ozempic) 0.25 mg or 0.5 mg (2 mg/3 mL) pen injector; Inject 0.5 mg under the skin 1 (one) time per week.    metFORMIN (Glucophage) 500 mg tablet; Take 2 tablets (1,000 mg) by mouth 2 times a day.    Hemoglobin A1C; Future

## 2024-12-20 ENCOUNTER — TELEPHONE (OUTPATIENT)
Dept: PRIMARY CARE | Facility: CLINIC | Age: 74
End: 2024-12-20

## 2024-12-20 NOTE — TELEPHONE ENCOUNTER
Pt calling, states he was taking the Metformin 500 mg 2 tablets twice a day  and started getting pain and swelling in his legs and diarrhea. He stopped taking the Metformin 2 tablets twice a day and went back to 2 tablets once a day and went back on the Glimepiride 2 mg and is doing better. So he is asking for a refill on the Glimepiride 2 mg. CARLOS Lin

## 2024-12-23 DIAGNOSIS — I25.10 ATHEROSCLEROTIC CARDIOVASCULAR DISEASE: ICD-10-CM

## 2024-12-23 DIAGNOSIS — E11.9 TYPE 2 DIABETES MELLITUS WITHOUT COMPLICATION, WITHOUT LONG-TERM CURRENT USE OF INSULIN (MULTI): Primary | ICD-10-CM

## 2024-12-23 RX ORDER — GLIMEPIRIDE 2 MG/1
2 TABLET ORAL
Qty: 100 TABLET | Refills: 3 | Status: SHIPPED | OUTPATIENT
Start: 2024-12-23 | End: 2026-01-27

## 2024-12-31 ENCOUNTER — APPOINTMENT (OUTPATIENT)
Dept: PHARMACY | Facility: HOSPITAL | Age: 74
End: 2024-12-31
Payer: COMMERCIAL

## 2024-12-31 DIAGNOSIS — I25.10 ATHEROSCLEROTIC CARDIOVASCULAR DISEASE: ICD-10-CM

## 2024-12-31 DIAGNOSIS — E11.9 TYPE 2 DIABETES MELLITUS WITHOUT COMPLICATION, WITHOUT LONG-TERM CURRENT USE OF INSULIN (MULTI): ICD-10-CM

## 2024-12-31 RX ORDER — TIRZEPATIDE 2.5 MG/.5ML
2.5 INJECTION, SOLUTION SUBCUTANEOUS WEEKLY
Qty: 2 ML | Refills: 0 | Status: SHIPPED | OUTPATIENT
Start: 2024-12-31

## 2024-12-31 NOTE — PROGRESS NOTES
Clinical Pharmacy Appointment    Patient ID: Franky Sauceda is a 74 y.o. male who presents for Diabetes.    Pt is here for First appointment.     Referring Provider: Kay Lucas APRN-C*  PCP: CARLOS Cerna   Last visit with PCP: 10/16/2024   Next visit with PCP: 4/16/2025      Subjective     HPI  DIABETES MELLITUS TYPE 2:    Diagnosed with diabetes: several years  Known diabetic complications: proteinuria  Does patient follow with Endocrinology: no  Last optometry exam: follows regularly  Most recent visit in Podiatry: no podiatrist -- patient denies sores or cuts on feet today      Current diabetic medications include:  glimepiride 2 mg daily  metformin 500 mg twice daily  Ozempic 0.5 mg once weekly    Patient has been having hypoglycemia with glimepiride and occasional nausea with Ozempic. He denies missed doses at this time.    Historical diabetic medications include:  Jardiance - not tolerated due to side effects, frequent urination    Glucose Readings:  Patient checks blood glucose twice daily with glucometer  Average fasting blood glucose is 88 mg/dL  Average of 123 mg/dL approximately 2 hours after eating  He has had recent symptoms of hypoglycemia - treated appropriately        Lifestyle:  Diet: 2 meals/day, some snacks, limits sugar  Physical Activity: active around the house, landscaping  Alcohol Use: none    Secondary Prevention:  Statin? Yes  ACE-I/ARB? Yes  Aspirin? No    Pertinent PMH Review:  PMH of Pancreatitis: No  PMH of Retinopathy: No  PMH of Urinary Tract Infections: No  PMH of MTC: No    Drug Interactions  Reviewed interaction of diltiazem and metoprolol  metoprolol dose previously lowered, but patient continues to be bradycardic - will discuss with PCP    Medication System Management  Patient's preferred pharmacy: [facility] in [place], [place]  Adherence/Organization: no concerns identified  Affordability/Accessibility: no concerns at this time      Objective   Allergies    Allergen Reactions    Cerivastatin Other     Back/neck stiffness    Jardiance [Empagliflozin] Unknown    Nifedipine Other     Urinary urgency    Statins-Hmg-Coa Reductase Inhibitors Other     Joint pain/stiffness    Icosapent Ethyl Palpitations     Social History     Social History Narrative    Not on file      Medication Review  Current Outpatient Medications   Medication Instructions    apixaban (ELIQUIS) 5 mg, oral, 2 times daily    atorvastatin (LIPITOR) 80 mg, oral, Daily    Blood glucose monitoring meter kit kit 1 each, As needed    blood sugar diagnostic (True Metrix Glucose Test Strip) strip 100 strips, miscellaneous, Daily    cholecalciferol (Vitamin D-3) 50 mcg (2,000 unit) capsule 1 capsule, Daily    dilTIAZem ER (TIAZAC) 300 mg, oral, Daily    lancets misc 100 Lancets, miscellaneous, Daily    losartan (COZAAR) 100 mg, oral, Daily    metFORMIN (GLUCOPHAGE) 1,000 mg, oral, 2 times daily    metoprolol succinate XL (TOPROL-XL) 25 mg, oral, Daily    Mounjaro 2.5 mg, subcutaneous, Weekly    pantoprazole (PROTONIX) 40 mg, oral, Daily      Vitals  BP Readings from Last 2 Encounters:   10/16/24 130/80   08/28/24 134/66     BMI Readings from Last 1 Encounters:   10/16/24 33.67 kg/m²      Labs  A1C  Lab Results   Component Value Date    HGBA1C 6.0 (H) 10/14/2024    HGBA1C 5.7 (H) 08/02/2024    HGBA1C 6.7 (H) 04/16/2024     BMP  Lab Results   Component Value Date    CALCIUM 8.2 (L) 08/03/2024     08/03/2024    K 4.0 08/03/2024    CO2 22 08/03/2024     08/03/2024    BUN 17 08/03/2024    CREATININE 1.08 08/03/2024    EGFR 72 08/03/2024     LFTs  Lab Results   Component Value Date    ALT 34 08/02/2024    AST 28 08/02/2024    ALKPHOS 113 08/02/2024    BILITOT 0.9 08/02/2024     FLP  Lab Results   Component Value Date    TRIG 130 10/14/2024    CHOL 128 10/14/2024    LDLCALC 65 10/14/2024    HDL 37.2 10/14/2024     Urine Microalbumin  Lab Results   Component Value Date    MICROALBCREA 30 - 300 (A) 04/12/2023      Weight Management  Wt Readings from Last 3 Encounters:   10/16/24 103 kg (228 lb)   08/28/24 103 kg (226 lb)   08/12/24 103 kg (226 lb)      There is no height or weight on file to calculate BMI.     Assessment/Plan   Problem List Items Addressed This Visit       Atherosclerotic cardiovascular disease    Relevant Medications    tirzepatide (Mounjaro) 2.5 mg/0.5 mL pen injector    Other Relevant Orders    Referral to Clinical Pharmacy    Diabetes mellitus (Multi)    Relevant Medications    tirzepatide (Mounjaro) 2.5 mg/0.5 mL pen injector    Other Relevant Orders    Referral to Clinical Pharmacy     Other Visit Diagnoses       Body mass index (BMI) 33.0-33.9, adult        Relevant Medications    tirzepatide (Mounjaro) 2.5 mg/0.5 mL pen injector    Other Relevant Orders    Referral to Clinical Pharmacy            DISCUSSION/PLAN:  Patient with controlled diabetes, most recent A1c of 6.0% in October 2024 (goal < 7.5% due to age and comorbidities). He recently attempted to increase metformin dose, but he was unable to tolerate and has resumed glimepiride. His average fasting blood glucose is 88 mg/dL. Will stop glimepiride at this time as it is contributing to hypoglycemia. Will switch Ozempic to Mounjaro for improved weight loss potential, which could also help with blood pressure management. Follow up in one month or sooner if needed.    CONTINUE  metformin 500 mg twice daily  STOP  glimepiride  Ozempic  START  Mounjaro 2.5 mg under the skin once weekly on Saturdays    Future Considerations:  Mounjaro dose titration  Initiate CGM if able    Monitoring and Education:  Counseled on Mounjaro mechanism of action, benefits, side effects, monitoring, and potential complications  Detailed education on Mounjaro administration to ensure proper technique  Discussed missed doses, storage, and titration schedule  Reviewed symptoms and treatment of hypoglycemia  Answered all patient questions      Continue all meds under the  continuation of care with the referring provider and clinical pharmacy team.    Clinical Pharmacist follow-up: January 28th, 2025 at 2:20 PM  Orders placed: Mounjaro 2.5 mg to [facility] in [place], [place]    Thank you,   Lora Can, PharmD  Clinical Pharmacy Specialist, Primary Care   [phone]    Verbal consent to manage patient's drug therapy was obtained from the patient . Patient was informed he  may decline to participate or withdraw from participation in pharmacy services at any time.

## 2024-12-31 NOTE — Clinical Note
"Good morning Kay,  This patient continues to be bradycardic with combination of diltiazem and metoprolol. See data chart under subjective for recent heart rate and blood pressure readings from patient. I know you previously lowered the metoprolol dose, but his average heart rate is still only 48. I would recommend stopping either diltiazem or metoprolol at this time. He has a documented \"allergy\" to nifedipine, but could consider switching diltiazem to amlodipine as this would still help with blood pressure with less effect on heart rate. We can talk more tomorrow if you want.  Lora"

## 2025-01-14 NOTE — PROGRESS NOTES
Kay Lucas, APRN-CNP       Patient on 's assistance for [facility]. Previous note from RN looks like script sent to them good through end of year. Pt to reach out if they need assistance with refills.

## 2025-01-15 ENCOUNTER — HOSPITAL ENCOUNTER (OUTPATIENT)
Dept: CARDIOLOGY | Facility: CLINIC | Age: 75
Discharge: HOME | End: 2025-01-15
Payer: COMMERCIAL

## 2025-01-15 ENCOUNTER — TELEPHONE (OUTPATIENT)
Dept: CARDIOLOGY | Facility: CLINIC | Age: 75
End: 2025-01-15
Payer: COMMERCIAL

## 2025-01-15 DIAGNOSIS — I48.0 PAF (PAROXYSMAL ATRIAL FIBRILLATION) (MULTI): ICD-10-CM

## 2025-01-15 NOTE — TELEPHONE ENCOUNTER
"----- Message from Nurse Reema ZARATE sent at 1/15/2025  8:32 AM EST -----  Regarding: FW: Bradycardia    ----- Message -----  From: Loy Soni MD  Sent: 1/15/2025   8:18 AM EST  To: [employer] [facility] Clinical Support Staff  Subject: RE: Bradycardia                                  Send 7 day Holter  ----- Message -----  From: CARLOS Ling  Sent: 1/15/2025   7:55 AM EST  To: Loy Soni MD  Subject: FW: Bradycardia                                  From Kay Lucas. See below.   Patient is reportedly americo.  ----- Message -----  From: CARLOS Cerna  Sent: 1/14/2025   4:42 PM EST  To: Loy Soni MD; CARLOS Ling  Subject: Bradycardia                                      Good afternoon, I am reaching out about Mr. Sauceda.  He was a patient of Dr. Lee's who I now see.  He had a consult with Clinical Pharmacist, Lora Can, Pharm D who sent me the following message.  Could you weigh in on this?  THANKS  OLLIE Fox  ----- Message -----  From: Lora Can PharmD  Sent: 1/7/2025  11:31 AM EST  To: CARLOS Cerna    Good morning Kay,    This patient continues to be bradycardic with combination of diltiazem and metoprolol. See data chart under subjective for recent heart rate and blood pressure readings from patient. I know you previously lowered the metoprolol dose, but his average heart rate is still only 48. I would recommend stopping either diltiazem or metoprolol at this time. He has a documented \"allergy\" to nifedipine, but could consider switching diltiazem to amlodipine as this would still help with blood pressure with less effect on heart rate. We can talk more tomorrow if you want.    Lora"

## 2025-01-17 ENCOUNTER — LAB (OUTPATIENT)
Dept: LAB | Facility: LAB | Age: 75
End: 2025-01-17
Payer: COMMERCIAL

## 2025-01-17 DIAGNOSIS — E11.9 TYPE 2 DIABETES MELLITUS WITHOUT COMPLICATION, WITHOUT LONG-TERM CURRENT USE OF INSULIN (MULTI): ICD-10-CM

## 2025-01-17 LAB
EST. AVERAGE GLUCOSE BLD GHB EST-MCNC: 128 MG/DL
HBA1C MFR BLD: 6.1 %

## 2025-01-17 PROCEDURE — 83036 HEMOGLOBIN GLYCOSYLATED A1C: CPT

## 2025-01-27 ENCOUNTER — TELEPHONE (OUTPATIENT)
Dept: CARDIOLOGY | Facility: CLINIC | Age: 75
End: 2025-01-27

## 2025-01-27 NOTE — TELEPHONE ENCOUNTER
----- Message from Loy Soni sent at 1/27/2025 11:27 AM EST -----  Holter just sinus americo 51 no AVB.  ----- Message -----  From: Loy Soni MD  Sent: 1/27/2025  10:54 AM EST  To: Loy Soni MD

## 2025-01-28 ENCOUNTER — APPOINTMENT (OUTPATIENT)
Dept: PHARMACY | Facility: HOSPITAL | Age: 75
End: 2025-01-28
Payer: COMMERCIAL

## 2025-01-28 DIAGNOSIS — E11.9 TYPE 2 DIABETES MELLITUS WITHOUT COMPLICATION, WITHOUT LONG-TERM CURRENT USE OF INSULIN (MULTI): ICD-10-CM

## 2025-01-28 DIAGNOSIS — I25.10 ATHEROSCLEROTIC CARDIOVASCULAR DISEASE: ICD-10-CM

## 2025-01-28 PROCEDURE — RXMED WILLOW AMBULATORY MEDICATION CHARGE

## 2025-01-28 RX ORDER — TIRZEPATIDE 5 MG/.5ML
5 INJECTION, SOLUTION SUBCUTANEOUS WEEKLY
Qty: 2 ML | Refills: 0 | Status: SHIPPED | OUTPATIENT
Start: 2025-01-28

## 2025-01-28 RX ORDER — BLOOD-GLUCOSE SENSOR
EACH MISCELLANEOUS
Qty: 2 EACH | Refills: 3 | Status: SHIPPED | OUTPATIENT
Start: 2025-01-28

## 2025-01-28 RX ORDER — BLOOD-GLUCOSE,RECEIVER,CONT
EACH MISCELLANEOUS
Qty: 1 EACH | Refills: 0 | Status: SHIPPED | OUTPATIENT
Start: 2025-01-28

## 2025-01-28 NOTE — PROGRESS NOTES
Clinical Pharmacy Appointment    Patient ID: Franky Sauceda is a 74 y.o. male who presents for Diabetes.    Pt is here for Follow Up appointment.     Referring Provider: Kay Lucas APRN-C*  PCP: CARLOS Cerna   Last visit with PCP: 10/16/2024   Next visit with PCP: 4/16/2025    INTERVAL HISTORY   Patient's last appointment with clinical pharmacy team on 12/31/2024  Recent hospitalizations? No   Medication changes? No   Missed doses of medications? No   Side effects? Yes  Patient had one episode of diarrhea with first Mounjaro dose, now resolved   Updated relevant labs? Yes  A1c currently 6.1% (previously 6.0%)   Changes to diet or exercise regimen? Yes   He has noticed a bit more hunger and has gained a few pounds with transition from Ozempic to Mounjaro      Subjective     HPI  DIABETES MELLITUS TYPE 2:    Diagnosed with diabetes: several years  Known diabetic complications: proteinuria  Does patient follow with Endocrinology: no  Last optometry exam: follows regularly  Most recent visit in Podiatry: no podiatrist     Current diabetic medications include:  metformin 500 mg twice daily  Mounjaro 2.5 mg once weekly    Historical diabetic medications include:  Jardiance - not tolerated due to side effects, frequent urination  glimepiride - stopped due to hypoglycemia    Glucose Readings:  Patient checks blood glucose twice daily with glucometer  Average fasting blood glucose is 139 mg/dL  Average of 185 mg/dL approximately 2 hours after eating  He is aware of hypoglycemia symptoms and denies at this time        Lifestyle:  Diet: 2 meals/day, some snacks, limits sugar  Physical Activity: active around the house, landscaping  Alcohol Use: none    Secondary Prevention:  Statin? Yes  ACE-I/ARB? Yes  Aspirin? No    Pertinent PMH Review:  PMH of Pancreatitis: No  PMH of Retinopathy: No  PMH of Urinary Tract Infections: No  PMH of MTC: No    Drug Interactions  Reviewed interaction of diltiazem and  metoprolol - cardiology aware and does not want to adjust current regimen    Medication System Management  Patient's preferred pharmacy: [facility] in [place], [place]  Adherence/Organization: no concerns identified  Affordability/Accessibility: no concerns at this time      Objective   Allergies   Allergen Reactions    Cerivastatin Other     Back/neck stiffness    Jardiance [Empagliflozin] Unknown    Nifedipine Other     Urinary urgency    Statins-Hmg-Coa Reductase Inhibitors Other     Joint pain/stiffness    Icosapent Ethyl Palpitations     Social History     Social History Narrative    Not on file      Medication Review  Current Outpatient Medications   Medication Instructions    apixaban (ELIQUIS) 5 mg, oral, 2 times daily    atorvastatin (LIPITOR) 80 mg, oral, Daily    Blood glucose monitoring meter kit kit 1 each, As needed    blood sugar diagnostic (True Metrix Glucose Test Strip) strip 100 strips, miscellaneous, Daily    cholecalciferol (Vitamin D-3) 50 mcg (2,000 unit) capsule 1 capsule, Daily    dilTIAZem ER (TIAZAC) 300 mg, oral, Daily    FreeStyle Jaun 3 [place] misc Use as instructed    FreeStyle Jaun 3 Sensor device Change sensor every 14 days as directed. Rotate sites.    lancets misc 100 Lancets, miscellaneous, Daily    losartan (COZAAR) 100 mg, oral, Daily    metFORMIN (GLUCOPHAGE) 1,000 mg, oral, 2 times daily    metoprolol succinate XL (TOPROL-XL) 25 mg, oral, Daily    Mounjaro 5 mg, subcutaneous, Weekly    pantoprazole (PROTONIX) 40 mg, oral, Daily      Vitals  BP Readings from Last 2 Encounters:   10/16/24 130/80   08/28/24 134/66     BMI Readings from Last 1 Encounters:   10/16/24 33.67 kg/m²      Labs  A1C  Lab Results   Component Value Date    HGBA1C 6.1 (H) 01/17/2025    HGBA1C 6.0 (H) 10/14/2024    HGBA1C 5.7 (H) 08/02/2024     BMP  Lab Results   Component Value Date    CALCIUM 8.2 (L) 08/03/2024     08/03/2024    K 4.0 08/03/2024    CO2 22 08/03/2024     08/03/2024    BUN 17  08/03/2024    CREATININE 1.08 08/03/2024    EGFR 72 08/03/2024     LFTs  Lab Results   Component Value Date    ALT 34 08/02/2024    AST 28 08/02/2024    ALKPHOS 113 08/02/2024    BILITOT 0.9 08/02/2024     FLP  Lab Results   Component Value Date    TRIG 130 10/14/2024    CHOL 128 10/14/2024    LDLCALC 65 10/14/2024    HDL 37.2 10/14/2024     Urine Microalbumin  Lab Results   Component Value Date    MICROALBCREA 30 - 300 (A) 04/12/2023     Weight Management  Wt Readings from Last 3 Encounters:   10/16/24 103 kg (228 lb)   08/28/24 103 kg (226 lb)   08/12/24 103 kg (226 lb)      There is no height or weight on file to calculate BMI.     Assessment/Plan   Problem List Items Addressed This Visit       Atherosclerotic cardiovascular disease    Relevant Medications    tirzepatide (Mounjaro) 5 mg/0.5 mL pen injector    Other Relevant Orders    Referral to Clinical Pharmacy    Diabetes mellitus (Multi)    Relevant Medications    tirzepatide (Mounjaro) 5 mg/0.5 mL pen injector    FreeStyle Jaun 3 Sensor device    FreeStyle Jaun 3 [place] misc    Other Relevant Orders    Referral to Clinical Pharmacy     Other Visit Diagnoses       Body mass index (BMI) 33.0-33.9, adult        Relevant Medications    tirzepatide (Mounjaro) 5 mg/0.5 mL pen injector    Other Relevant Orders    Referral to Clinical Pharmacy            DISCUSSION/PLAN:  Patient with controlled diabetes, most recent A1c of 6.1% in January 2025 (goal < 7.5% due to age and comorbidities). He recently stopped glimepiride due to hypoglycemia and was switched from Ozempic to Mounjaro for improved weight loss potential. Patient denies significant side effects, missed doses, or hypoglycemia at this time. Blood glucose has trended up and he has been more hungry, so will increase Mounjaro dose to 5 mg.     He is interested in continuous blood glucose monitoring, but his insurance will only cover CGMs for patients on insulin or with multiple episodes of level 2  hypoglycemia (BG < 54 mg/dL). As this criteria does not apply, recommended starting Freestyle Jaun 3 system [facility] with  discount for most cost effective option. Follow up in one month or sooner if needed.    CONTINUE  metformin 500 mg twice daily  INCREASE  Mounjaro to 5 mg under the skin once weekly on Saturdays    Future Considerations:  Mounjaro dose titration  Jaun 3 data sharing    Monitoring and Education:  Counseled on Mounjaro mechanism of action, dosing, side effects, and monitoring  Educated on Freestyle Jaun 3 sensor application and recommended rotating sites  Reviewed symptoms and treatment of hypoglycemia  Answered all patient questions      Continue all meds under the continuation of care with the referring provider and clinical pharmacy team.    Clinical Pharmacist follow-up: February 25th, 2025 at 2:00 PM  Orders placed: Freestyle Jaun 3 sensors/reader to [facility] for home delivery, Mounjaro 5 mg to [facility] in [place], [place]    Thank you,   Lora Can, PharmD  Clinical Pharmacy Specialist, Primary Care   [phone]    Verbal consent to manage patient's drug therapy was obtained from the patient . Patient was informed he  may decline to participate or withdraw from participation in pharmacy services at any time.

## 2025-01-30 ENCOUNTER — TELEPHONE (OUTPATIENT)
Dept: PHARMACY | Facility: HOSPITAL | Age: 75
End: 2025-01-30
Payer: COMMERCIAL

## 2025-01-30 ENCOUNTER — PHARMACY VISIT (OUTPATIENT)
Dept: PHARMACY | Facility: CLINIC | Age: 75
End: 2025-01-30
Payer: MEDICARE

## 2025-01-30 PROCEDURE — RXMED WILLOW AMBULATORY MEDICATION CHARGE

## 2025-01-30 NOTE — TELEPHONE ENCOUNTER
Clinical Pharmacy Phone Call    Returned phone call to patient to discuss phone compatibility with Jaun 3 system. Patient has iPhone 8+ with Privy version 16.7.10. This should be compatible with Jaun 3 bill, so patient should NOT need the reader at this time.    Thank you,   Lora Can, PharmD  Clinical Pharmacy Specialist, Primary Care   [phone]

## 2025-02-22 ENCOUNTER — TELEPHONE (OUTPATIENT)
Dept: PRIMARY CARE | Facility: CLINIC | Age: 75
End: 2025-02-22
Payer: COMMERCIAL

## 2025-02-22 DIAGNOSIS — E11.9 TYPE 2 DIABETES MELLITUS WITHOUT COMPLICATION, WITHOUT LONG-TERM CURRENT USE OF INSULIN (MULTI): Primary | ICD-10-CM

## 2025-02-24 ENCOUNTER — TELEMEDICINE (OUTPATIENT)
Dept: PHARMACY | Facility: HOSPITAL | Age: 75
End: 2025-02-24
Payer: COMMERCIAL

## 2025-02-24 DIAGNOSIS — I25.10 ATHEROSCLEROTIC CARDIOVASCULAR DISEASE: ICD-10-CM

## 2025-02-24 DIAGNOSIS — E11.9 TYPE 2 DIABETES MELLITUS WITHOUT COMPLICATION, WITHOUT LONG-TERM CURRENT USE OF INSULIN (MULTI): ICD-10-CM

## 2025-02-24 NOTE — TELEPHONE ENCOUNTER
Patient called answering service.  He is having issues with his Mounjaro and can no longer take it.  Per on call physician Dr. Escobar, he is resuming Glimepiride.  Same dose as before.  Physician attempted to send in Januvia but his insurance would not cover this.  He is to call Kay Lucas on Monday for further instruction as he only had enough Glimepiride to last until then.    Regular diet

## 2025-02-24 NOTE — PROGRESS NOTES
Clinical Pharmacy Appointment    Patient ID: Franky Sauceda is a 74 y.o. male who presents for Diabetes.    Pt is here for Follow Up appointment.     Referring Provider: Kay Lucas APRN-C*  PCP: CARLOS Cerna   Last visit with PCP: 10/16/2024   Next visit with PCP: 4/16/2025    INTERVAL HISTORY   Patient's last appointment with clinical pharmacy team on 1/28/2025  Recent hospitalizations? No   Medication changes? No   Missed doses of medications? Yes  Patient stopped Mounjaro due to severe GI side effects  Side effects? Yes  Severe GI side effects with Mounjaro - diarrhea for more than one week  Updated relevant labs? No  Changes to diet or exercise regimen? No      Subjective     HPI  DIABETES MELLITUS TYPE 2:    Diagnosed with diabetes: several years  Known diabetic complications: proteinuria  Does patient follow with Endocrinology: no  Last optometry exam: follows regularly  Most recent visit in Podiatry: no podiatrist     Current diabetic medications include:  metformin 500 mg twice daily  glimepiride 1 mg twice daily    Historical diabetic medications include:  Jardiance - not tolerated due to side effects, frequent urination  Ozempic - switched to Mounjaro for improved weight loss  Mounjaro - stopped due to diarrhea    Glucose Readings:  Patient has continuous blood glucose monitoring via Freestyle Jaun 3  Readings have been trending up a little since stopping Mounjaro  He endorses recent overnight hypoglycemia - likely due to restarting glimepiride    Lifestyle:  Diet: 2 meals/day, some snacks, limits sugar  Physical Activity: active around the house, landscaping  Alcohol Use: none    Secondary Prevention:  Statin? Yes  ACE-I/ARB? Yes  Aspirin? No    Pertinent PMH Review:  PMH of Pancreatitis: No  PMH of Retinopathy: No  PMH of Urinary Tract Infections: No  PMH of MTC: No    Drug Interactions  Reviewed interaction of diltiazem and metoprolol - cardiology aware and does not want to adjust  current regimen    Medication System Management  Patient's preferred pharmacy: [facility] in [place], [place]  Adherence/Organization: no concerns identified  Affordability/Accessibility: no concerns at this time      Objective   Allergies   Allergen Reactions    Cerivastatin Other     Back/neck stiffness    Jardiance [Empagliflozin] Unknown    Nifedipine Other     Urinary urgency    Statins-Hmg-Coa Reductase Inhibitors Other     Joint pain/stiffness    Icosapent Ethyl Palpitations     Social History     Social History Narrative    Not on file      Medication Review  Current Outpatient Medications   Medication Instructions    apixaban (ELIQUIS) 5 mg, oral, 2 times daily    atorvastatin (LIPITOR) 80 mg, oral, Daily    Blood glucose monitoring meter kit kit 1 each, As needed    blood sugar diagnostic (True Metrix Glucose Test Strip) strip 100 strips, miscellaneous, Daily    cholecalciferol (Vitamin D-3) 50 mcg (2,000 unit) capsule 1 capsule, Daily    dilTIAZem ER (TIAZAC) 300 mg, oral, Daily    FreeStyle Jaun 3 [place] misc Use as instructed    FreeStyle Jaun 3 Sensor device Change sensor every 14 days as directed. Rotate sites.    lancets misc 100 Lancets, miscellaneous, Daily    losartan (COZAAR) 100 mg, oral, Daily    metFORMIN (GLUCOPHAGE) 1,000 mg, oral, 2 times daily    metoprolol succinate XL (TOPROL-XL) 25 mg, oral, Daily    pantoprazole (PROTONIX) 40 mg, oral, Daily    semaglutide 0.25 mg or 0.5 mg (2 mg/3 mL) pen injector Inject 0.25 mg under the skin 1 (one) time per week for 28 days, THEN 0.5 mg 1 (one) time per week for 14 days.      Vitals  BP Readings from Last 2 Encounters:   10/16/24 130/80   08/28/24 134/66     BMI Readings from Last 1 Encounters:   10/16/24 33.67 kg/m²      Labs  A1C  Lab Results   Component Value Date    HGBA1C 6.1 (H) 01/17/2025    HGBA1C 6.0 (H) 10/14/2024    HGBA1C 5.7 (H) 08/02/2024     BMP  Lab Results   Component Value Date    CALCIUM 8.2 (L) 08/03/2024      08/03/2024    K 4.0 08/03/2024    CO2 22 08/03/2024     08/03/2024    BUN 17 08/03/2024    CREATININE 1.08 08/03/2024    EGFR 72 08/03/2024     LFTs  Lab Results   Component Value Date    ALT 34 08/02/2024    AST 28 08/02/2024    ALKPHOS 113 08/02/2024    BILITOT 0.9 08/02/2024     FLP  Lab Results   Component Value Date    TRIG 130 10/14/2024    CHOL 128 10/14/2024    LDLCALC 65 10/14/2024    HDL 37.2 10/14/2024     Urine Microalbumin  Lab Results   Component Value Date    MICROALBCREA 30 - 300 (A) 04/12/2023     Weight Management  Wt Readings from Last 3 Encounters:   10/16/24 103 kg (228 lb)   08/28/24 103 kg (226 lb)   08/12/24 103 kg (226 lb)      There is no height or weight on file to calculate BMI.     Assessment/Plan   Problem List Items Addressed This Visit       Atherosclerotic cardiovascular disease    Relevant Medications    semaglutide 0.25 mg or 0.5 mg (2 mg/3 mL) pen injector    Other Relevant Orders    Referral to Clinical Pharmacy    Diabetes mellitus (Multi)    Relevant Medications    semaglutide 0.25 mg or 0.5 mg (2 mg/3 mL) pen injector    Other Relevant Orders    Referral to Clinical Pharmacy     Other Visit Diagnoses       Body mass index (BMI) 33.0-33.9, adult        Relevant Medications    semaglutide 0.25 mg or 0.5 mg (2 mg/3 mL) pen injector    Other Relevant Orders    Referral to Clinical Pharmacy            DISCUSSION/PLAN:  Patient with controlled diabetes, most recent A1c of 6.1% in January 2025 (goal < 7.5% due to age and comorbidities). He was recently switched from Ozempic to Mounjaro for improved weight loss potential, but he has now stopped Mounjaro due to severe prolonged diarrhea.     Patient was previously on glimepiride and resumed twice daily dosing per on call physician. Unfortunately, he is now having episodes of overnight hypoglycemia. He has continuous blood glucose monitoring via Freestyle Jaun 3. Discussed options with patient including continuing glimepiride,  increasing metformin, switching to Januvia, and/or resuming Ozempic. Patient willing to resume Ozempic as he did tolerate this before. Will wait until this weekend to ensure that GI symptoms are completely resolved and restart at lowest dose. Instructed to continue morning glimepiride dose until then - stop evening dose as this is likely causing hypoglycemia. Will follow up in two weeks to reassess.    CONTINUE  metformin 500 mg twice daily  glimepiride 1 mg daily in the morning until supply is used up (4 more doses)  STOP  Mounjaro  RESTART  Ozempic 0.25 mg under the skin once weekly on Saturday    Future Considerations:  Ozempic dose titration  Jaun 3 data sharing    Monitoring and Education:  Counseled on mechanism of action, dosing, benefits, side effects, and monitoring of diabetes medications  Reviewed symptoms and treatment of hypoglycemia  Answered all patient questions    Continue all meds under the continuation of care with the referring provider and clinical pharmacy team.    Clinical Pharmacist follow-up: March 10th at 1:00 PM  Orders placed: Ozempic 0.25 mg to [facility] in [place], [place]    Thank you,   Lora Can, PharmD  Clinical Pharmacy Specialist, Primary Care   [phone]    Verbal consent to manage patient's drug therapy was obtained from the patient . Patient was informed he  may decline to participate or withdraw from participation in pharmacy services at any time.

## 2025-02-25 ENCOUNTER — APPOINTMENT (OUTPATIENT)
Dept: PHARMACY | Facility: HOSPITAL | Age: 75
End: 2025-02-25
Payer: COMMERCIAL

## 2025-03-10 ENCOUNTER — APPOINTMENT (OUTPATIENT)
Dept: PHARMACY | Facility: HOSPITAL | Age: 75
End: 2025-03-10
Payer: COMMERCIAL

## 2025-03-10 DIAGNOSIS — I25.10 ATHEROSCLEROTIC CARDIOVASCULAR DISEASE: ICD-10-CM

## 2025-03-10 DIAGNOSIS — E11.9 TYPE 2 DIABETES MELLITUS WITHOUT COMPLICATION, WITHOUT LONG-TERM CURRENT USE OF INSULIN (MULTI): ICD-10-CM

## 2025-03-10 NOTE — PROGRESS NOTES
Clinical Pharmacy Appointment    Patient ID: Franky Sauceda is a 74 y.o. male who presents for Diabetes.    Pt is here for Follow Up appointment.     Referring Provider: Kay Lucas APRN-C*  PCP: CARLOS Cerna   Last visit with PCP: 10/16/2024   Next visit with PCP: 4/16/2025    INTERVAL HISTORY   Patient's last appointment with clinical pharmacy team on 2/24/2025  Recent hospitalizations? No   Medication changes? No   Missed doses of medications? No  Side effects? No  Updated relevant labs? No  Changes to diet or exercise regimen? No      Subjective     HPI  DIABETES MELLITUS TYPE 2:    Diagnosed with diabetes: several years  Known diabetic complications: proteinuria  Does patient follow with Endocrinology: no  Last optometry exam: follows regularly  Most recent visit in Podiatry: no podiatrist     Current diabetic medications include:  metformin 500 mg twice daily  Ozempic 0.25 mg weekly    Historical diabetic medications include:  Jardiance - not tolerated due to side effects, frequent urination  Ozempic - switched to Mounjaro for improved weight loss  Mounjaro - stopped due to diarrhea    Glucose Readings:  Patient has continuous blood glucose monitoring via [employer] Jaun 3  Current average glucose around 150 mg/dL  He denies symptoms of hypoglycemia at this time    Lifestyle:  Diet: 2 meals/day, some snacks, limits sugar  Physical Activity: active around the house, landscaping  Alcohol Use: none    Secondary Prevention:  Statin? Yes  ACE-I/ARB? Yes  Aspirin? No    Pertinent PMH Review:  PMH of Pancreatitis: No  PMH of Retinopathy: No  PMH of Urinary Tract Infections: No  PMH of MTC: No    Drug Interactions  Reviewed interaction of diltiazem and metoprolol - cardiology aware and does not want to adjust current regimen    Medication System Management  Patient's preferred pharmacy: [facility] in [place], [place]  Adherence/Organization: no concerns identified  Affordability/Accessibility:  no concerns at this time      Objective   Allergies   Allergen Reactions    Cerivastatin Other     Back/neck stiffness    Jardiance [Empagliflozin] Unknown    Nifedipine Other     Urinary urgency    Statins-Hmg-Coa Reductase Inhibitors Other     Joint pain/stiffness    Icosapent Ethyl Palpitations     Social History     Social History Narrative    Not on file      Medication Review  Current Outpatient Medications   Medication Instructions    apixaban (ELIQUIS) 5 mg, oral, 2 times daily    atorvastatin (LIPITOR) 80 mg, oral, Daily    Blood glucose monitoring meter kit kit 1 each, As needed    blood sugar diagnostic (True Metrix Glucose Test Strip) strip 100 strips, miscellaneous, Daily    cholecalciferol (Vitamin D-3) 50 mcg (2,000 unit) capsule 1 capsule, Daily    dilTIAZem ER (TIAZAC) 300 mg, oral, Daily    FreeStyle Jaun 3 [place] misc Use as instructed    FreeStyle Jaun 3 Sensor device Change sensor every 14 days as directed. Rotate sites.    lancets misc 100 Lancets, miscellaneous, Daily    losartan (COZAAR) 100 mg, oral, Daily    metFORMIN (GLUCOPHAGE) 1,000 mg, oral, 2 times daily    metoprolol succinate XL (TOPROL-XL) 25 mg, oral, Daily    pantoprazole (PROTONIX) 40 mg, oral, Daily    semaglutide 0.25 mg or 0.5 mg (2 mg/3 mL) pen injector Inject 0.25 mg under the skin 1 (one) time per week for 28 days, THEN 0.5 mg 1 (one) time per week for 14 days.      Vitals  BP Readings from Last 2 Encounters:   10/16/24 130/80   08/28/24 134/66     BMI Readings from Last 1 Encounters:   10/16/24 33.67 kg/m²      Labs  A1C  Lab Results   Component Value Date    HGBA1C 6.1 (H) 01/17/2025    HGBA1C 6.0 (H) 10/14/2024    HGBA1C 5.7 (H) 08/02/2024     BMP  Lab Results   Component Value Date    CALCIUM 8.2 (L) 08/03/2024     08/03/2024    K 4.0 08/03/2024    CO2 22 08/03/2024     08/03/2024    BUN 17 08/03/2024    CREATININE 1.08 08/03/2024    EGFR 72 08/03/2024     LFTs  Lab Results   Component Value Date    ALT  34 08/02/2024    AST 28 08/02/2024    ALKPHOS 113 08/02/2024    BILITOT 0.9 08/02/2024     FLP  Lab Results   Component Value Date    TRIG 130 10/14/2024    CHOL 128 10/14/2024    LDLCALC 65 10/14/2024    HDL 37.2 10/14/2024     Urine Microalbumin  Lab Results   Component Value Date    MICROALBCREA 30 - 300 (A) 04/12/2023     Weight Management  Wt Readings from Last 3 Encounters:   10/16/24 103 kg (228 lb)   08/28/24 103 kg (226 lb)   08/12/24 103 kg (226 lb)      There is no height or weight on file to calculate BMI.     Assessment/Plan   Problem List Items Addressed This Visit       Atherosclerotic cardiovascular disease    Relevant Orders    Referral to Clinical Pharmacy    Diabetes mellitus (Multi)    Relevant Orders    Referral to Clinical Pharmacy     Other Visit Diagnoses       Body mass index (BMI) 33.0-33.9, adult        Relevant Orders    Referral to Clinical Pharmacy            DISCUSSION/PLAN:  Patient with controlled diabetes, most recent A1c of 6.1% in January 2025 (goal < 7.5% due to age and comorbidities). He was recently switched back to Ozempic as Mounjaro was not tolerated. He denies significant side effects or symptoms of hypoglycemia at this time. He has continuous blood glucose monitoring via Freestyle Jaun 3 with average glucose around 150 mg/dL. Will continue current Ozempic dose for two more weeks, then increase Ozempic to 0.5 mg as he is tolerating well. Follow up in one month or sooner if needed.    CONTINUE  metformin 500 mg twice daily  Ozempic 0.25 mg under the skin once weekly (Saturdays) for two more weeks  THEN INCREASE Ozempic to 0.5 mg once weekly    Future Considerations:  Ozempic dose titration  Jaun 3 data sharing    Monitoring and Education:  Counseled on mechanism of action, dosing, benefits, side effects, and monitoring of diabetes medications  Reviewed symptoms and treatment of hypoglycemia  Answered all patient questions    Continue all meds under the continuation of care  with the referring provider and clinical pharmacy team.    Clinical Pharmacist follow-up: April 7th at 1:00 PM  Orders placed: none at this time    Thank you,   Lora Can, PharmD  Clinical Pharmacy Specialist, Primary Care   [phone]    Verbal consent to manage patient's drug therapy was obtained from the patient . Patient was informed he  may decline to participate or withdraw from participation in pharmacy services at any time.

## 2025-03-17 PROCEDURE — RXMED WILLOW AMBULATORY MEDICATION CHARGE

## 2025-03-19 ENCOUNTER — PHARMACY VISIT (OUTPATIENT)
Dept: PHARMACY | Facility: CLINIC | Age: 75
End: 2025-03-19

## 2025-03-19 ENCOUNTER — PHARMACY VISIT (OUTPATIENT)
Dept: PHARMACY | Facility: CLINIC | Age: 75
End: 2025-03-19
Payer: MEDICARE

## 2025-03-31 ENCOUNTER — TELEPHONE (OUTPATIENT)
Dept: PHARMACY | Facility: HOSPITAL | Age: 75
End: 2025-03-31
Payer: COMMERCIAL

## 2025-03-31 DIAGNOSIS — I25.10 ATHEROSCLEROTIC CARDIOVASCULAR DISEASE: ICD-10-CM

## 2025-03-31 DIAGNOSIS — E11.9 TYPE 2 DIABETES MELLITUS WITHOUT COMPLICATION, WITHOUT LONG-TERM CURRENT USE OF INSULIN: ICD-10-CM

## 2025-03-31 NOTE — TELEPHONE ENCOUNTER
Clinical Pharmacy Phone Call    Returned call to patient about Ozempic refill. Sent to [facility] in [place], [place]. Follow up as planned.    Clinical Pharmacist follow-up: April 7th at 1:00 PM     Thank you,   Lora Can, PharmD  Clinical Pharmacy Specialist, Primary Care   [phone]

## 2025-04-07 ENCOUNTER — APPOINTMENT (OUTPATIENT)
Dept: PHARMACY | Facility: HOSPITAL | Age: 75
End: 2025-04-07
Payer: COMMERCIAL

## 2025-04-07 DIAGNOSIS — E11.9 TYPE 2 DIABETES MELLITUS WITHOUT COMPLICATION, WITHOUT LONG-TERM CURRENT USE OF INSULIN: ICD-10-CM

## 2025-04-07 DIAGNOSIS — I25.10 ATHEROSCLEROTIC CARDIOVASCULAR DISEASE: ICD-10-CM

## 2025-04-07 NOTE — PROGRESS NOTES
Clinical Pharmacy Appointment    Patient ID: Franky Sauceda is a 74 y.o. male who presents for Diabetes.    Pt is here for Follow Up appointment.     Referring Provider: Kay Lucas APRN-C*  PCP: CARLOS Cerna   Last visit with PCP: 10/16/2024   Next visit with PCP: 4/16/2025    INTERVAL HISTORY   Patient's last appointment with clinical pharmacy team on 3/10/2025  Recent hospitalizations? No   Medication changes? No   Missed doses of medications? No  Side effects? Yes  Patient endorses occasional mild nausea with Ozempic  Updated relevant labs? No  Changes to diet or exercise regimen? No      Subjective     HPI  DIABETES MELLITUS TYPE 2:    Diagnosed with diabetes: several years  Known diabetic complications: proteinuria  Does patient follow with Endocrinology: no  Last optometry exam: follows regularly  Most recent visit in Podiatry: no podiatrist     Current diabetic medications include:  metformin 500 mg twice daily  Ozempic 0.5 mg weekly    Historical diabetic medications include:  Jardiance - not tolerated due to side effects, frequent urination  Ozempic - switched to Mounjaro for improved weight loss  Mounjaro - stopped due to diarrhea    Glucose Readings:  Patient has continuous blood glucose monitoring via Freestyle Jaun 3 (uses reader)  Current average glucose around 150 mg/dL  He denies symptoms of hypoglycemia at this time    Lifestyle:  Diet: 2 meals/day, some snacks, limits sugar  Physical Activity: active around the house, landscaping  Alcohol Use: none    Weight:  Previous weight 228 pounds at office visit 10/16/2024 (on Ozempic 0.5 mg)  Current weight 230.4 pounds on home scale 4/7/2025 (on Ozempic 0.5 mg)    Secondary Prevention:  Statin? Yes  ACE-I/ARB? Yes  Aspirin? No    Pertinent PMH Review:  PMH of Pancreatitis: No  PMH of Retinopathy: No  PMH of Urinary Tract Infections: No  PMH of MTC: No    Drug Interactions  Reviewed interaction of diltiazem and metoprolol - cardiology  aware and does not want to adjust current regimen    Medication System Management  Patient's preferred pharmacy: [facility] in [place], [place]  Adherence/Organization: no concerns identified  Affordability/Accessibility: no concerns at this time      Objective   Allergies   Allergen Reactions    Cerivastatin Other     Back/neck stiffness    Jardiance [Empagliflozin] Unknown    Nifedipine Other     Urinary urgency    Statins-Hmg-Coa Reductase Inhibitors Other     Joint pain/stiffness    Icosapent Ethyl Palpitations     Social History     Social History Narrative    Not on file      Medication Review  Current Outpatient Medications   Medication Instructions    apixaban (ELIQUIS) 5 mg, oral, 2 times daily    atorvastatin (LIPITOR) 80 mg, oral, Daily    Blood glucose monitoring meter kit kit 1 each, As needed    blood sugar diagnostic (True Metrix Glucose Test Strip) strip 100 strips, miscellaneous, Daily    cholecalciferol (Vitamin D-3) 50 mcg (2,000 unit) capsule 1 capsule, Daily    dilTIAZem ER (TIAZAC) 300 mg, oral, Daily    FreeStyle Jaun 3 [place] misc Use as instructed    FreeStyle Jaun 3 Sensor device Change sensor every 14 days as directed. Rotate sites.    lancets misc 100 Lancets, miscellaneous, Daily    losartan (COZAAR) 100 mg, oral, Daily    metFORMIN (GLUCOPHAGE) 1,000 mg, oral, 2 times daily    metoprolol succinate XL (TOPROL-XL) 25 mg, oral, Daily    pantoprazole (PROTONIX) 40 mg, oral, Daily    semaglutide 0.5 mg, subcutaneous, Weekly      Vitals  BP Readings from Last 2 Encounters:   10/16/24 130/80   08/28/24 134/66     BMI Readings from Last 1 Encounters:   10/16/24 33.67 kg/m²      Labs  A1C  Lab Results   Component Value Date    HGBA1C 6.1 (H) 01/17/2025    HGBA1C 6.0 (H) 10/14/2024    HGBA1C 5.7 (H) 08/02/2024     BMP  Lab Results   Component Value Date    CALCIUM 8.2 (L) 08/03/2024     08/03/2024    K 4.0 08/03/2024    CO2 22 08/03/2024     08/03/2024    BUN 17 08/03/2024     CREATININE 1.08 08/03/2024    EGFR 72 08/03/2024     LFTs  Lab Results   Component Value Date    ALT 34 08/02/2024    AST 28 08/02/2024    ALKPHOS 113 08/02/2024    BILITOT 0.9 08/02/2024     FLP  Lab Results   Component Value Date    TRIG 130 10/14/2024    CHOL 128 10/14/2024    LDLCALC 65 10/14/2024    HDL 37.2 10/14/2024     Urine Microalbumin  Lab Results   Component Value Date    MICROALBCREA 30 - 300 (A) 04/12/2023     Weight Management  Wt Readings from Last 3 Encounters:   10/16/24 103 kg (228 lb)   08/28/24 103 kg (226 lb)   08/12/24 103 kg (226 lb)      There is no height or weight on file to calculate BMI.     Assessment/Plan   Problem List Items Addressed This Visit       Atherosclerotic cardiovascular disease    Relevant Medications    semaglutide 0.25 mg or 0.5 mg (2 mg/3 mL) pen injector    Other Relevant Orders    Referral to Clinical Pharmacy    Diabetes mellitus (Multi)    Relevant Medications    semaglutide 0.25 mg or 0.5 mg (2 mg/3 mL) pen injector    Other Relevant Orders    Referral to Clinical Pharmacy     Other Visit Diagnoses       Body mass index (BMI) 33.0-33.9, adult        Relevant Medications    semaglutide 0.25 mg or 0.5 mg (2 mg/3 mL) pen injector    Other Relevant Orders    Referral to Clinical Pharmacy            DISCUSSION/PLAN:  Patient with controlled diabetes, most recent A1c of 6.1% in January 2025 (goal < 7.5% due to age and comorbidities). He was switched back to Ozempic as Mounjaro was not tolerated. He endorses occasional mild nausea with current dose. Patient has continuous blood glucose monitoring via Freestyle Jaun 3 with no recent hypoglycemia. Plan to increase Ozempic dose in the near future for improved weight loss potential and better control of post-prandial levels, but titrating slowly due to previous severe side effects with Mounjaro. Will continue current diabetes regimen for now and reassess in one month or sooner if needed.    CONTINUE  metformin 500 mg twice  daily  Ozempic 0.5 mg under the skin once weekly on Saturdays    Future Considerations:  Ozempic dose titration    Monitoring and Education:  Counseled on mechanism of action, dosing, benefits, side effects, and monitoring of diabetes medications  Reviewed symptoms and treatment of hypoglycemia  Answered all patient questions    Continue all meds under the continuation of care with the referring provider and clinical pharmacy team.    Clinical Pharmacist follow-up: May 13th, 2025 at 1:00 PM  Orders placed: Ozempic 0.5 mg to [facility] in [place], [place]    Thank you,   Lora Can, PharmD  Clinical Pharmacy Specialist, Primary Care   [phone]    Verbal consent to manage patient's drug therapy was obtained from the patient . Patient was informed he  may decline to participate or withdraw from participation in pharmacy services at any time.

## 2025-04-08 DIAGNOSIS — I25.10 ATHEROSCLEROTIC CARDIOVASCULAR DISEASE: ICD-10-CM

## 2025-04-08 DIAGNOSIS — I48.91 ATRIAL FIBRILLATION, UNSPECIFIED TYPE (MULTI): ICD-10-CM

## 2025-04-08 DIAGNOSIS — E11.9 TYPE 2 DIABETES MELLITUS WITHOUT COMPLICATION, WITHOUT LONG-TERM CURRENT USE OF INSULIN: ICD-10-CM

## 2025-04-08 DIAGNOSIS — I48.4 ATYPICAL ATRIAL FLUTTER: ICD-10-CM

## 2025-04-08 DIAGNOSIS — I10 ESSENTIAL HYPERTENSION: ICD-10-CM

## 2025-04-08 DIAGNOSIS — E78.2 MIXED HYPERLIPIDEMIA: ICD-10-CM

## 2025-04-08 DIAGNOSIS — K21.9 GASTROESOPHAGEAL REFLUX DISEASE WITHOUT ESOPHAGITIS: ICD-10-CM

## 2025-04-09 PROCEDURE — RXMED WILLOW AMBULATORY MEDICATION CHARGE

## 2025-04-09 RX ORDER — ATORVASTATIN CALCIUM 80 MG/1
80 TABLET, FILM COATED ORAL DAILY
Qty: 90 TABLET | Refills: 3 | Status: SHIPPED | OUTPATIENT
Start: 2025-04-09

## 2025-04-09 RX ORDER — LOSARTAN POTASSIUM 100 MG/1
100 TABLET ORAL DAILY
Qty: 90 TABLET | Refills: 3 | Status: SHIPPED | OUTPATIENT
Start: 2025-04-09

## 2025-04-09 RX ORDER — APIXABAN 5 MG/1
5 TABLET, FILM COATED ORAL 2 TIMES DAILY
Qty: 180 TABLET | Refills: 3 | Status: SHIPPED | OUTPATIENT
Start: 2025-04-09

## 2025-04-09 RX ORDER — METOPROLOL SUCCINATE 25 MG/1
25 TABLET, EXTENDED RELEASE ORAL DAILY
Qty: 90 TABLET | Refills: 3 | Status: SHIPPED | OUTPATIENT
Start: 2025-04-09

## 2025-04-09 RX ORDER — DILTIAZEM HYDROCHLORIDE 300 MG/1
300 CAPSULE, EXTENDED RELEASE ORAL DAILY
Qty: 90 CAPSULE | Refills: 3 | Status: SHIPPED | OUTPATIENT
Start: 2025-04-09

## 2025-04-09 RX ORDER — PANTOPRAZOLE SODIUM 40 MG/1
40 TABLET, DELAYED RELEASE ORAL DAILY
Qty: 90 TABLET | Refills: 3 | Status: SHIPPED | OUTPATIENT
Start: 2025-04-09

## 2025-04-14 ENCOUNTER — PHARMACY VISIT (OUTPATIENT)
Dept: PHARMACY | Facility: CLINIC | Age: 75
End: 2025-04-14
Payer: COMMERCIAL

## 2025-04-15 ENCOUNTER — OFFICE VISIT (OUTPATIENT)
Dept: CARDIOLOGY | Facility: CLINIC | Age: 75
End: 2025-04-15
Payer: COMMERCIAL

## 2025-04-15 ENCOUNTER — HOSPITAL ENCOUNTER (OUTPATIENT)
Dept: CARDIOLOGY | Facility: CLINIC | Age: 75
Discharge: HOME | End: 2025-04-15
Payer: COMMERCIAL

## 2025-04-15 VITALS
SYSTOLIC BLOOD PRESSURE: 133 MMHG | HEIGHT: 70 IN | DIASTOLIC BLOOD PRESSURE: 70 MMHG | BODY MASS INDEX: 33.5 KG/M2 | TEMPERATURE: 97.2 F | HEART RATE: 53 BPM | WEIGHT: 234 LBS

## 2025-04-15 DIAGNOSIS — I35.0 SEVERE AORTIC STENOSIS: ICD-10-CM

## 2025-04-15 DIAGNOSIS — I10 PRIMARY HYPERTENSION: ICD-10-CM

## 2025-04-15 DIAGNOSIS — I25.10 CORONARY ARTERY DISEASE INVOLVING NATIVE CORONARY ARTERY OF NATIVE HEART WITHOUT ANGINA PECTORIS: ICD-10-CM

## 2025-04-15 DIAGNOSIS — I48.0 PAF (PAROXYSMAL ATRIAL FIBRILLATION) (MULTI): ICD-10-CM

## 2025-04-15 DIAGNOSIS — G45.9 TIA (TRANSIENT ISCHEMIC ATTACK): ICD-10-CM

## 2025-04-15 DIAGNOSIS — I10 ESSENTIAL HYPERTENSION: Primary | ICD-10-CM

## 2025-04-15 DIAGNOSIS — E78.2 MIXED HYPERLIPIDEMIA: ICD-10-CM

## 2025-04-15 LAB
AORTIC VALVE MEAN GRADIENT: 55 MMHG
AORTIC VALVE PEAK VELOCITY: 4.74 M/S
AV PEAK GRADIENT: 90 MMHG
AVA (PEAK VEL): 0.95 CM2
AVA (VTI): 0.93 CM2
EJECTION FRACTION APICAL 4 CHAMBER: 79.5
EJECTION FRACTION: 76 %
LEFT ATRIUM VOLUME AREA LENGTH INDEX BSA: 37.3 ML/M2
LEFT VENTRICLE INTERNAL DIMENSION DIASTOLE: 5.36 CM (ref 3.5–6)
LEFT VENTRICULAR OUTFLOW TRACT DIAMETER: 2 CM
RIGHT VENTRICLE FREE WALL PEAK S': 15 CM/S
RIGHT VENTRICLE PEAK SYSTOLIC PRESSURE: 43.2 MMHG
TRICUSPID ANNULAR PLANE SYSTOLIC EXCURSION: 2.4 CM

## 2025-04-15 PROCEDURE — 99215 OFFICE O/P EST HI 40 MIN: CPT | Mod: 25 | Performed by: INTERNAL MEDICINE

## 2025-04-15 PROCEDURE — 1123F ACP DISCUSS/DSCN MKR DOCD: CPT | Performed by: INTERNAL MEDICINE

## 2025-04-15 PROCEDURE — 3044F HG A1C LEVEL LT 7.0%: CPT | Performed by: INTERNAL MEDICINE

## 2025-04-15 PROCEDURE — 99215 OFFICE O/P EST HI 40 MIN: CPT | Performed by: INTERNAL MEDICINE

## 2025-04-15 PROCEDURE — 93306 TTE W/DOPPLER COMPLETE: CPT

## 2025-04-15 PROCEDURE — 1036F TOBACCO NON-USER: CPT | Performed by: INTERNAL MEDICINE

## 2025-04-15 PROCEDURE — 3078F DIAST BP <80 MM HG: CPT | Performed by: INTERNAL MEDICINE

## 2025-04-15 PROCEDURE — 93306 TTE W/DOPPLER COMPLETE: CPT | Performed by: INTERNAL MEDICINE

## 2025-04-15 PROCEDURE — 4010F ACE/ARB THERAPY RXD/TAKEN: CPT | Performed by: INTERNAL MEDICINE

## 2025-04-15 PROCEDURE — 1159F MED LIST DOCD IN RCRD: CPT | Performed by: INTERNAL MEDICINE

## 2025-04-15 PROCEDURE — 3075F SYST BP GE 130 - 139MM HG: CPT | Performed by: INTERNAL MEDICINE

## 2025-04-15 PROCEDURE — 1160F RVW MEDS BY RX/DR IN RCRD: CPT | Performed by: INTERNAL MEDICINE

## 2025-04-15 NOTE — PROGRESS NOTES
Chief Complaint:   Valve Disorder     History of Present Illness     Franky Sauceda is a 74 y.o. male presenting with for routine follow-up of paroxysmal atrial fibrillation.  The patient initially presented with symptoms of palpitations.  The patient has been maintaining sinus rhythm on medical treatment which they are tolerating well and are compliant.  The current treatment strategy is rhythm control.  The CHADS2-VASC2 score is 5  and the ACC/AHA guidelines recommend anticoagulation for stroke prophylaxis.  The patient is being treated with Eliquis and has tolerated treatment with no bleeding and is compliant.  Had TIA on 8/2/24.  Denies CP, HERNDON or palpitations or syncope.  Vision disturbance transient 20-40 mins w negative retinal exam and no CVA. Since last OV 8/28/24 feels well.  No palpitations, CP, dyspnea, syncope.  Active.  CAC 2101 in 2017.    Review of Systems  All pertinent systems have been reviewed and are negative except for what is stated in the history of present illness.    All other systems have been reviewed and are negative and noncontributory to this patient's current ailments.         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), Coronary artery disease involving native coronary artery of native heart without angina pectoris (04/02/2024), Dorsalgia, unspecified (09/27/2016), Encounter for immunization (06/29/2018), Fatigue (04/10/2023), Localized edema (07/07/2022), Moderate aortic stenosis (04/02/2024), PAF (paroxysmal atrial fibrillation) (Multi) (04/02/2024), Pain in left knee (12/08/2021), Pain in unspecified knee (04/28/2020), Personal history of other diseases of the circulatory system, Personal history of other diseases of urinary system (07/07/2022), Personal history of other infectious and parasitic diseases (05/14/2018), Severe aortic stenosis (04/02/2024), Sprain of other specified parts of left knee, initial  "encounter (01/02/2020), and Unilateral primary osteoarthritis, left knee (08/20/2019).    Past Surgical History:  He has a past surgical history that includes Colonoscopy (01/28/2014); Esophagogastroduodenoscopy (01/28/2014); and Cholecystectomy (01/28/2014).      Social History:  He reports that he has never smoked. He has never used smokeless tobacco. He reports that he does not currently use alcohol. He reports that he does not use drugs.    Family History:  Family History   Problem Relation Name Age of Onset    Hypertension Mother      Other (abdominal aortic aneurysm) Father      Hyperlipidemia Father      Hypertension Father          Allergies:  Cerivastatin, Jardiance [empagliflozin], Mounjaro [tirzepatide], Nifedipine, Statins-hmg-coa reductase inhibitors, and Icosapent ethyl    Outpatient Medications:  Current Outpatient Medications   Medication Instructions    atorvastatin (LIPITOR) 80 mg, oral, Daily    Blood glucose monitoring meter kit kit 1 each, As needed    blood sugar diagnostic (True Metrix Glucose Test Strip) strip 100 strips, miscellaneous, Daily    cholecalciferol (Vitamin D-3) 50 mcg (2,000 unit) capsule 1 capsule, Daily    dilTIAZem ER (TIAZAC) 300 mg, oral, Daily    Eliquis 5 mg, oral, 2 times daily    FreeStyle Jaun 3 [place] misc Use as instructed    FreeStyle Jaun 3 Sensor device Change sensor every 14 days as directed. Rotate sites.    lancets misc 100 Lancets, miscellaneous, Daily    losartan (COZAAR) 100 mg, oral, Daily    metFORMIN (GLUCOPHAGE) 1,000 mg, oral, 2 times daily    metoprolol succinate XL (TOPROL-XL) 25 mg, oral, Daily    pantoprazole (PROTONIX) 40 mg, oral, Daily    semaglutide 0.5 mg, subcutaneous, Weekly       Physical Examination   Vitals:  Visit Vitals  /70   Pulse 53   Temp 36.2 °C (97.2 °F)   Ht 1.778 m (5' 10\")   Wt 106 kg (234 lb)   BMI 33.58 kg/m²   Smoking Status Never   BSA 2.29 m²    Physical Exam  Vitals reviewed.   Constitutional:       General: He is " not in acute distress.     Appearance: Normal appearance.   HENT:      Head: Normocephalic and atraumatic.      Nose: Nose normal.   Eyes:      Conjunctiva/sclera: Conjunctivae normal.   Cardiovascular:      Rate and Rhythm: Normal rate and regular rhythm.      Pulses: Normal pulses.      Heart sounds: Murmur heard.      Systolic murmur is present with a grade of 4/6.   Pulmonary:      Effort: Pulmonary effort is normal. No respiratory distress.      Breath sounds: Normal breath sounds. No wheezing, rhonchi or rales.   Abdominal:      General: Bowel sounds are normal. There is no distension.      Palpations: Abdomen is soft.      Tenderness: There is no abdominal tenderness.   Musculoskeletal:         General: No swelling.      Right lower leg: No edema.      Left lower leg: No edema.   Skin:     General: Skin is warm and dry.      Capillary Refill: Capillary refill takes less than 2 seconds.   Neurological:      General: No focal deficit present.      Mental Status: He is alert.   Psychiatric:         Mood and Affect: Mood normal.             Labs/Imaging/Cardiac Studies     Last Labs:  CBC -  Lab Results   Component Value Date    WBC 8.2 10/14/2024    HGB 13.5 10/14/2024    HCT 41.0 10/14/2024    MCV 94 10/14/2024     10/14/2024       CMP -  Lab Results   Component Value Date    CALCIUM 8.2 (L) 08/03/2024    PROT 7.0 08/02/2024    ALBUMIN 4.0 08/02/2024    AST 28 08/02/2024    ALT 34 08/02/2024    ALKPHOS 113 08/02/2024    BILITOT 0.9 08/02/2024       LIPID PANEL -   Lab Results   Component Value Date    CHOL 128 10/14/2024    CHOL 153 04/12/2023    HDL 37.2 10/14/2024    HDL 36 04/12/2023    CHHDL 3.4 10/14/2024    CHHDL 4.2 04/12/2023     04/12/2023    VLDL 26 10/14/2024    TRIG 130 10/14/2024    TRIG 188 (A) 04/12/2023    NHDL 91 10/14/2024       RENAL FUNCTION PANEL -   Lab Results   Component Value Date    K 4.0 08/03/2024       Lab Results   Component Value Date    BNP 10 08/02/2024    HGBA1C  6.1 (H) 01/17/2025    HGBA1C 6.1 04/12/2023     Reviewed Labs and Echo     Echo:  No echocardiogram results found for the past 12 months       Assessment and Recommendations     Assessment/Plan   1. Severe aortic stenosis  The patient has severe aortic stenosis and remains asymptomatic.  There is no indication for AVR.  The patient will continue to have serial annual echocardiography and was counseled on the expected symptoms related to aortic stenosis.  Weight lifting restrictions reviewed. High liklihood of AVR next 1 year.  He wishes to proceed with AVR and given the very high gradients that is reasonable.  Refer to structural valve team.     2. Primary hypertension  The patient's blood pressure has been well-controlled at today's appointment or by recent primary care provider's measurements/home measurements and meets their goal blood pressure per the ACC/AHA guidelines.  The patient has been compliant with their anti-hypertensive medications and is following a low sodium/DASH diet. I advised continuation of their present medical treatment and lifestyle modification.      3. PAF (paroxysmal atrial fibrillation) (Multi)  The patient has been clinically stable, asymptomatic, and maintaining normal sinus rhythm.  They will continue treatment with rate-controlling medications (Toprol XL) and anticoagulation (Eliquis) for stroke prophylaxis based upon their present [id number] score, the risks and benefits of which were discussed with the patient/family/caregiver.     4. Mixed hyperlipidemia  The patient's lipids are well controlled on chronic atorvastatin therapy and they are meeting their goal LDL cholesterol per the ACC/AHA guidelines.      5. TIA (transient ischemic attack)  Not thought to be a TIA last OV.    6. Coronary artery disease involving native coronary artery of native heart without angina pectoris  The patient's CAD, as detailed in the HPI, has been clinically stable, without any anginal symptoms or  dyspnea.  The patient will continue treatment with guideline-directed medical therapy with statin medications and was advised regular exercise and a heart healthy diet.                Loy Soni MD    Exclusive of any other services or procedures performed, I, Loy Soni MD , spent 30 minutes in duration for this visit today.  This time consisted of chart review, obtaining history, and/or performing the exam as documented above as well as documenting the clinical information for the encounter in the electronic record, discussing treatment options, plans, and/or goals with patient, family, and/or caregiver, refilling medications, updating the electronic record, ordering medicines, lab work, imaging, referrals, and/or procedures as documented above and communicating with other [facility] professionals. I have discussed the results of laboratory, radiology, and cardiology studies with the patient and their family/caregiver.

## 2025-04-16 ENCOUNTER — APPOINTMENT (OUTPATIENT)
Dept: PRIMARY CARE | Facility: CLINIC | Age: 75
End: 2025-04-16
Payer: COMMERCIAL

## 2025-04-16 VITALS
SYSTOLIC BLOOD PRESSURE: 128 MMHG | DIASTOLIC BLOOD PRESSURE: 72 MMHG | TEMPERATURE: 97.4 F | HEIGHT: 70 IN | WEIGHT: 233 LBS | BODY MASS INDEX: 33.36 KG/M2

## 2025-04-16 DIAGNOSIS — R35.1 NOCTURIA: ICD-10-CM

## 2025-04-16 DIAGNOSIS — I10 ESSENTIAL HYPERTENSION: ICD-10-CM

## 2025-04-16 DIAGNOSIS — R68.84 JAW PAIN: ICD-10-CM

## 2025-04-16 DIAGNOSIS — E11.9 TYPE 2 DIABETES MELLITUS WITHOUT COMPLICATION, WITHOUT LONG-TERM CURRENT USE OF INSULIN: Primary | ICD-10-CM

## 2025-04-16 DIAGNOSIS — E78.2 MIXED HYPERLIPIDEMIA: ICD-10-CM

## 2025-04-16 DIAGNOSIS — S03.00XA DISLOCATION OF TEMPOROMANDIBULAR JOINT, INITIAL ENCOUNTER: ICD-10-CM

## 2025-04-16 DIAGNOSIS — G62.9 NEUROPATHY: ICD-10-CM

## 2025-04-16 PROCEDURE — 3078F DIAST BP <80 MM HG: CPT | Performed by: NURSE PRACTITIONER

## 2025-04-16 PROCEDURE — 4010F ACE/ARB THERAPY RXD/TAKEN: CPT | Performed by: NURSE PRACTITIONER

## 2025-04-16 PROCEDURE — 3074F SYST BP LT 130 MM HG: CPT | Performed by: NURSE PRACTITIONER

## 2025-04-16 PROCEDURE — 3044F HG A1C LEVEL LT 7.0%: CPT | Performed by: NURSE PRACTITIONER

## 2025-04-16 PROCEDURE — 1159F MED LIST DOCD IN RCRD: CPT | Performed by: NURSE PRACTITIONER

## 2025-04-16 PROCEDURE — 99214 OFFICE O/P EST MOD 30 MIN: CPT | Performed by: NURSE PRACTITIONER

## 2025-04-16 PROCEDURE — 1123F ACP DISCUSS/DSCN MKR DOCD: CPT | Performed by: NURSE PRACTITIONER

## 2025-04-16 RX ORDER — PREDNISONE 20 MG/1
TABLET ORAL
Qty: 6 TABLET | Refills: 0 | Status: SHIPPED | OUTPATIENT
Start: 2025-04-16

## 2025-04-16 NOTE — PROGRESS NOTES
Subjective   Patient ID: Franky Sauceda is a 74 y.o. male who presents for follow up  History of Present Illness  Darrius is a 74-year-old male with coronary artery disease who presents for follow-up regarding upcoming cardiac surgery.  He mentioned a calcium score of 2100 and is concerned about the risk of stroke. He has been informed that the procedure may involve opening his chest, which he hopes to avoid.    He is trying to lose weight by being more careful with his diet and engaging in physical activities such as shoveling snow, mowing the lawn, and landscaping. He reports having to push himself more than before and feels more tired. He used to do treadmill exercises but stopped due to knee issues.  Still working full time.    He has difficulty sleeping, often waking up to urinate and then being unable to fall back asleep due to his mind being active. He used to sleep 10-12 hours but now only manages 6-7 hours.    He is currently on Ozempic, which causes occasional nausea and reduces his appetite. He is also on atorvastatin and diltiazem, which were refilled at the beginning of the month. No side effects from these medications.    He experienced ear pain about three weeks ago, which resolved, but subsequently developed jaw pain on the same side. The pain is located high near the ear and worsens with chewing hard foods. He consulted his dentist, who found no dental issues. He has a history of teeth grinding, which may be contributing to the jaw pain.    He reports mild swelling in his feet and ankles, which is usually better by morning. He tries to watch his salt intake and stays well-hydrated.    No recent illnesses such as flu or pneumonia over the winter.    Review of Systems   Constitutional:  Positive for activity change and appetite change.        Expected weight change   HENT:  Positive for ear pain. Negative for sinus pressure, sinus pain and sore throat.    Respiratory:  Positive for shortness of breath.   "  Cardiovascular:  Positive for leg swelling.   Skin:  Negative for rash and wound.   Psychiatric/Behavioral:  The patient is nervous/anxious.        Physical Exam     Results  DIAGNOSTIC  Calcium score: 2100 (04/15/2025)       Objective     /72   Temp 36.3 °C (97.4 °F)   Ht 1.765 m (5' 9.5\")   Wt 106 kg (233 lb)   BMI 33.91 kg/m²      Physical Exam  GENERAL: Alert, cooperative, well developed, no acute distress.  HEENT: Normocephalic, normal oropharynx, moist mucous membranes, ears normal with + tenderness on manipulation on right side over TMJ  Left without incident.  CHEST: Clear to auscultation bilaterally, no wheezes, rhonchi, or crackles.  CARDIOVASCULAR: Slow heart rate, S1 and S2 with 4/6 murmur  ABDOMEN: Soft, non-tender, non-distended, without organomegaly, normal bowel sounds.  EXTREMITIES: Mild swelling in feet and ankles with sock lines present, no cyanosis.  MUSCULOSKELETAL: Jaw pain on palpation, right side.  NEUROLOGICAL: Cranial nerves grossly intact, moves all extremities without gross motor or sensory deficit.    Assessment & Plan  Coronary Artery Disease  He has a high calcium score of 2100, indicating significant coronary artery disease. He is scheduled for cardiac surgery, likely involving open-heart surgery. He prefers to proceed with the surgery despite being informed it is not critical at this time, due to concerns about stroke risk. Planned procedures in a controlled environment have fewer complications than emergency situations.  - Proceed with cardiac surgery as planned  - Discuss medication management with the operative team, especially regarding Ozempic, to prevent complications during surgery    Type 2 Diabetes Mellitus  He is on Ozempic for diabetes management and reports occasional nausea as a side effect. Protocols require withholding Ozempic before surgery due to risks of slowed intestinal transit and aspiration during anesthesia. Clarification with the operative team is " advised regarding stopping Ozempic one or two weeks prior to surgery.  - Continue current diabetes management with Ozempic  - Discuss with the surgical team about withholding Ozempic prior to surgery    Temporomandibular Joint Disorder (TMJ)  He reports right-sided jaw pain, exacerbated by chewing, suspected to be related to clenching or grinding of teeth, possibly due to stress. Inflammation of the temporomandibular joint is suspected.  - Prescribe prednisone 20 mg twice daily for 3 days  - Follow up with the dentist if symptoms persist after prednisone treatment    General Health Maintenance  He is fasting for blood work today. Previous tests in October, including PSA, vitamin D, and thyroid, were normal. Plans to update metabolic panel and A1c. Reports no significant illnesses over the winter and has been managing weight through diet and activity.  - Order fasting metabolic panel and A1c  - Encourage continued weight management and physical activity    Follow-up  He is scheduled for a cardiac operation and will need follow-up care post-surgery. A six-month follow-up is planned unless changes are needed based on surgical outcomes.  - Schedule six-month follow-up appointment  - Adjust follow-up schedule as needed based on surgery outcomes    Recording duration: 21 minutes      Diagnoses and all orders for this visit:  Type 2 diabetes mellitus without complication, without long-term current use of insulin  -     Comprehensive Metabolic Panel; Future  -     Hemoglobin A1C; Future  Essential hypertension  -     CBC; Future  Mixed hyperlipidemia  -     Lipid Panel; Future  Jaw pain  -     predniSONE (Deltasone) 20 mg tablet; Take one pill twice daily x 3 days TAKE AFTER EATING  Dislocation of temporomandibular joint, initial encounter  -     predniSONE (Deltasone) 20 mg tablet; Take one pill twice daily x 3 days TAKE AFTER EATING       TONY Cerna-CNP     This medical note was created with the assistance of  artificial intelligence (AI) for documentation purposes. The content has been reviewed and confirmed by the healthcare provider for accuracy and completeness. Patient consented to the use of audio recording and use of AI during their visit.

## 2025-04-16 NOTE — PATIENT INSTRUCTIONS
Good to see you today.  Labs today and I will follow up  VISIT SUMMARY:  You are a 74-year-old male with coronary artery disease who came in for a follow-up regarding your upcoming cardiac surgery. You discussed your concerns about the surgery, your current medications, and some new symptoms you have been experiencing.    YOUR PLAN:  -CORONARY ARTERY DISEASE: Coronary artery disease is a condition where the blood vessels supplying your heart are narrowed or blocked. You have a high calcium score of 2100, indicating significant disease. You are scheduled for cardiac surgery, likely involving open-heart surgery, to reduce your risk of complications like stroke. Please proceed with the surgery as planned and discuss your medication management with the surgical team, especially regarding Ozempic, to prevent complications during surgery.    -TYPE 2 DIABETES MELLITUS: Type 2 diabetes is a condition where your body does not use insulin properly, leading to high blood sugar levels. You are currently managing this with Ozempic, which sometimes causes nausea. It is important to discuss with the surgical team about stopping Ozempic one or two weeks before your surgery to avoid complications during anesthesia.    -TEMPOROMANDIBULAR JOINT DISORDER (TMJ): TMJ disorder involves pain in the jaw joint and muscles controlling jaw movement, often due to clenching or grinding teeth. You have been experiencing right-sided jaw pain, likely due to stress-related teeth grinding. You are prescribed prednisone 20 mg twice daily for 3 days to reduce inflammation. If the pain persists, please follow up with your dentist.    -GENERAL HEALTH MAINTENANCE: You are fasting for blood work today to update your metabolic panel and A1c. Previous tests in October, including PSA, vitamin D, and thyroid, were normal. Continue managing your weight through diet and physical activity.    INSTRUCTIONS:  Please proceed with your cardiac surgery as planned and  discuss your medication management with the surgical team, especially regarding Ozempic. You are scheduled for a six-month follow-up appointment, but this may be adjusted based on the outcomes of your surgery. If your jaw pain persists after taking prednisone, follow up with your dentist.

## 2025-04-17 LAB
ALBUMIN SERPL-MCNC: 4.2 G/DL (ref 3.6–5.1)
ALBUMIN/GLOB SERPL: 1.8 (CALC) (ref 1–2.5)
ALP SERPL-CCNC: 90 U/L (ref 35–144)
ALT SERPL-CCNC: 14 U/L (ref 9–46)
AST SERPL-CCNC: 14 U/L (ref 10–35)
BILIRUB SERPL-MCNC: 1 MG/DL (ref 0.2–1.2)
BUN SERPL-MCNC: 25 MG/DL (ref 7–25)
BUN/CREAT SERPL: 18 (CALC) (ref 6–22)
CALCIUM SERPL-MCNC: 9.5 MG/DL (ref 8.6–10.3)
CHLORIDE SERPL-SCNC: 107 MMOL/L (ref 98–110)
CHOLEST SERPL-MCNC: 146 MG/DL
CHOLEST/HDLC SERPL: 3.7 (CALC)
CO2 SERPL-SCNC: 24 MMOL/L (ref 20–32)
CREAT SERPL-MCNC: 1.39 MG/DL (ref 0.7–1.28)
EGFRCR SERPLBLD CKD-EPI 2021: 53 ML/MIN/1.73M2
ERYTHROCYTE [DISTWIDTH] IN BLOOD BY AUTOMATED COUNT: 13 % (ref 11–15)
GLOBULIN SER CALC-MCNC: 2.3 G/DL (CALC) (ref 1.9–3.7)
GLUCOSE SERPL-MCNC: 112 MG/DL (ref 65–99)
HBA1C MFR BLD: 7.4 %
HCT VFR BLD AUTO: 42 % (ref 38.5–50)
HDLC SERPL-MCNC: 40 MG/DL
HGB BLD-MCNC: 14.3 G/DL (ref 13.2–17.1)
LDLC SERPL CALC-MCNC: 80 MG/DL (CALC)
MCH RBC QN AUTO: 31.2 PG (ref 27–33)
MCHC RBC AUTO-ENTMCNC: 34 G/DL (ref 32–36)
MCV RBC AUTO: 91.7 FL (ref 80–100)
NONHDLC SERPL-MCNC: 106 MG/DL (CALC)
PLATELET # BLD AUTO: 253 THOUSAND/UL (ref 140–400)
PMV BLD REES-ECKER: 12.1 FL (ref 7.5–12.5)
POTASSIUM SERPL-SCNC: 4.9 MMOL/L (ref 3.5–5.3)
PROT SERPL-MCNC: 6.5 G/DL (ref 6.1–8.1)
RBC # BLD AUTO: 4.58 MILLION/UL (ref 4.2–5.8)
SODIUM SERPL-SCNC: 140 MMOL/L (ref 135–146)
TRIGL SERPL-MCNC: 165 MG/DL
WBC # BLD AUTO: 7.8 THOUSAND/UL (ref 3.8–10.8)

## 2025-04-20 PROBLEM — R68.84 JAW PAIN: Status: ACTIVE | Noted: 2025-04-20

## 2025-04-20 ASSESSMENT — ENCOUNTER SYMPTOMS
WOUND: 0
ACTIVITY CHANGE: 1
SINUS PAIN: 0
SORE THROAT: 0
SINUS PRESSURE: 0
APPETITE CHANGE: 1
SHORTNESS OF BREATH: 1
NERVOUS/ANXIOUS: 1

## 2025-04-21 ENCOUNTER — TELEPHONE (OUTPATIENT)
Dept: CARDIOLOGY | Facility: CLINIC | Age: 75
End: 2025-04-21

## 2025-04-21 ENCOUNTER — OFFICE VISIT (OUTPATIENT)
Dept: CARDIAC SURGERY | Facility: HOSPITAL | Age: 75
End: 2025-04-21
Payer: COMMERCIAL

## 2025-04-21 ENCOUNTER — OFFICE VISIT (OUTPATIENT)
Dept: CARDIOLOGY | Facility: HOSPITAL | Age: 75
End: 2025-04-21
Payer: COMMERCIAL

## 2025-04-21 ENCOUNTER — HOSPITAL ENCOUNTER (OUTPATIENT)
Dept: RADIOLOGY | Facility: HOSPITAL | Age: 75
Discharge: HOME | End: 2025-04-21
Payer: COMMERCIAL

## 2025-04-21 VITALS
WEIGHT: 234.4 LBS | OXYGEN SATURATION: 95 % | HEIGHT: 70 IN | DIASTOLIC BLOOD PRESSURE: 70 MMHG | SYSTOLIC BLOOD PRESSURE: 143 MMHG | HEART RATE: 50 BPM | BODY MASS INDEX: 33.56 KG/M2

## 2025-04-21 DIAGNOSIS — I25.10 CORONARY ARTERY DISEASE INVOLVING NATIVE CORONARY ARTERY OF NATIVE HEART WITHOUT ANGINA PECTORIS: ICD-10-CM

## 2025-04-21 DIAGNOSIS — I35.0 NONRHEUMATIC AORTIC VALVE STENOSIS: ICD-10-CM

## 2025-04-21 DIAGNOSIS — I10 PRIMARY HYPERTENSION: ICD-10-CM

## 2025-04-21 DIAGNOSIS — I48.0 PAF (PAROXYSMAL ATRIAL FIBRILLATION) (MULTI): ICD-10-CM

## 2025-04-21 DIAGNOSIS — I10 ESSENTIAL HYPERTENSION: ICD-10-CM

## 2025-04-21 DIAGNOSIS — I35.0 SEVERE AORTIC STENOSIS: ICD-10-CM

## 2025-04-21 DIAGNOSIS — E78.2 MIXED HYPERLIPIDEMIA: ICD-10-CM

## 2025-04-21 DIAGNOSIS — I35.0 NONRHEUMATIC AORTIC VALVE STENOSIS: Primary | ICD-10-CM

## 2025-04-21 DIAGNOSIS — G45.9 TIA (TRANSIENT ISCHEMIC ATTACK): ICD-10-CM

## 2025-04-21 DIAGNOSIS — I35.0 SEVERE AORTIC STENOSIS: Primary | ICD-10-CM

## 2025-04-21 PROCEDURE — 99214 OFFICE O/P EST MOD 30 MIN: CPT | Performed by: INTERNAL MEDICINE

## 2025-04-21 PROCEDURE — 99212 OFFICE O/P EST SF 10 MIN: CPT | Performed by: STUDENT IN AN ORGANIZED HEALTH CARE EDUCATION/TRAINING PROGRAM

## 2025-04-21 PROCEDURE — 3078F DIAST BP <80 MM HG: CPT | Performed by: INTERNAL MEDICINE

## 2025-04-21 PROCEDURE — 4010F ACE/ARB THERAPY RXD/TAKEN: CPT | Performed by: STUDENT IN AN ORGANIZED HEALTH CARE EDUCATION/TRAINING PROGRAM

## 2025-04-21 PROCEDURE — 3044F HG A1C LEVEL LT 7.0%: CPT | Performed by: STUDENT IN AN ORGANIZED HEALTH CARE EDUCATION/TRAINING PROGRAM

## 2025-04-21 PROCEDURE — 74174 CTA ABD&PLVS W/CONTRAST: CPT

## 2025-04-21 PROCEDURE — 4010F ACE/ARB THERAPY RXD/TAKEN: CPT | Performed by: INTERNAL MEDICINE

## 2025-04-21 PROCEDURE — 1123F ACP DISCUSS/DSCN MKR DOCD: CPT | Performed by: INTERNAL MEDICINE

## 2025-04-21 PROCEDURE — 99202 OFFICE O/P NEW SF 15 MIN: CPT | Performed by: STUDENT IN AN ORGANIZED HEALTH CARE EDUCATION/TRAINING PROGRAM

## 2025-04-21 PROCEDURE — 2550000001 HC RX 255 CONTRASTS: Mod: JZ | Performed by: INTERNAL MEDICINE

## 2025-04-21 PROCEDURE — 1159F MED LIST DOCD IN RCRD: CPT | Performed by: INTERNAL MEDICINE

## 2025-04-21 PROCEDURE — 3044F HG A1C LEVEL LT 7.0%: CPT | Performed by: INTERNAL MEDICINE

## 2025-04-21 PROCEDURE — 1123F ACP DISCUSS/DSCN MKR DOCD: CPT | Performed by: STUDENT IN AN ORGANIZED HEALTH CARE EDUCATION/TRAINING PROGRAM

## 2025-04-21 PROCEDURE — 1126F AMNT PAIN NOTED NONE PRSNT: CPT | Performed by: INTERNAL MEDICINE

## 2025-04-21 PROCEDURE — 99204 OFFICE O/P NEW MOD 45 MIN: CPT | Performed by: INTERNAL MEDICINE

## 2025-04-21 PROCEDURE — 3075F SYST BP GE 130 - 139MM HG: CPT | Performed by: INTERNAL MEDICINE

## 2025-04-21 RX ADMIN — IOHEXOL 80 ML: 350 INJECTION, SOLUTION INTRAVENOUS at 11:36

## 2025-04-21 ASSESSMENT — PAIN SCALES - GENERAL: PAINLEVEL_OUTOF10: 0-NO PAIN

## 2025-04-21 ASSESSMENT — ENCOUNTER SYMPTOMS
OCCASIONAL FEELINGS OF UNSTEADINESS: 1
LOSS OF SENSATION IN FEET: 0
DEPRESSION: 0

## 2025-04-21 ASSESSMENT — COLUMBIA-SUICIDE SEVERITY RATING SCALE - C-SSRS
1. IN THE PAST MONTH, HAVE YOU WISHED YOU WERE DEAD OR WISHED YOU COULD GO TO SLEEP AND NOT WAKE UP?: NO
2. HAVE YOU ACTUALLY HAD ANY THOUGHTS OF KILLING YOURSELF?: NO
6. HAVE YOU EVER DONE ANYTHING, STARTED TO DO ANYTHING, OR PREPARED TO DO ANYTHING TO END YOUR LIFE?: NO

## 2025-04-21 NOTE — PROGRESS NOTES
PCP: TONY Cerna-CNP  Cards: Dr. Soni    HPI    74 y.o. y/o male with PMH of hypertension, Atypical atrial flutter, TIA, Hyperlipidemia, paroxysmal atrial fibrillation, CAD presents for evaluation of severe, symptomatic aortic stenosis.  Patient relates a 6 months history of worsening fatigue, SOB, HERNDON. He reports getting tired when walking at his yard and taking care of the garden. He also gets extremely tired when wash the car and with exertion.  Gradually doing less and less activity secondary to symptoms.      Denies overt orthopnea, PND, exertional CP.  He denies dizziness.  Denies syncope or presyncope.  Denies increased abdominal girth, edema.    Full 14 point ROS complete and negative except as noted above.     TAVR Workup:   - Echo: EF 76%, mod-severe aortic stenosis, AV mean gradient 90/55, AV Vmax 4.74, MICHAEL 0.93   - EKG:Normal sinus rhythm, , QRS 78  - NYHA: 2  - LHC: Pending  - CT TAVR: 04/21/2025  - dental clearance: regular dentist visits q6 months, no issues.   - EFT 0/5  - STS   Procedure Type: Isolated AVR  Perioperative Outcome Estimate %  Operative Mortality 1.01%  Morbidity & Mortality 5.55%  Stroke 0.674%  Renal Failure 0.846%  Reoperation 3.74%  Prolonged Ventilation 2.5%  Deep Sternal Wound Infection 0.047%  Long Hospital Stay (>14 days) 2.47%  Short Hospital Stay (<6 days) 55.7%      Social History     Tobacco Use    Smoking status: Never     Passive exposure: Never    Smokeless tobacco: Never   Substance Use Topics    Alcohol use: Not Currently        Family History[1]     RX Allergies[2]     Current Outpatient Medications   Medication Instructions    atorvastatin (LIPITOR) 80 mg, oral, Daily    Blood glucose monitoring meter kit kit 1 each, As needed    blood sugar diagnostic (True Metrix Glucose Test Strip) strip 100 strips, miscellaneous, Daily    cholecalciferol (Vitamin D-3) 50 mcg (2,000 unit) capsule 1 capsule, Daily    dilTIAZem ER (TIAZAC) 300 mg, oral, Daily     Eliquis 5 mg, oral, 2 times daily    FreeStyle Jaun 3 [place] misc Use as instructed    FreeStyle Jaun 3 Sensor device Change sensor every 14 days as directed. Rotate sites.    lancets misc 100 Lancets, miscellaneous, Daily    losartan (COZAAR) 100 mg, oral, Daily    metFORMIN (GLUCOPHAGE) 1,000 mg, oral, 2 times daily    metoprolol succinate XL (TOPROL-XL) 25 mg, oral, Daily    pantoprazole (PROTONIX) 40 mg, oral, Daily    predniSONE (Deltasone) 20 mg tablet Take one pill twice daily x 3 days TAKE AFTER EATING    semaglutide 0.5 mg, subcutaneous, Weekly        Vitals:    04/21/25 1001   BP: 143/70   Pulse:    SpO2:         Physical Exam:     General:  Alert, calm, well-developed   HEENT:  Pupils equal, round, reactive to light and accommodation. Extraocular movements   Neck:  Supple, without lymphadenopathy or thyromegaly. No carotid bruits.  Lymph:  No axillary, cervical, supraclavicular, pre-auricular, submental, or occipital lymphadenopathy,  Cardiovascular:  Regular rate and rhythm, with normal S1 and S2. Aortic murmurs 3/6, no rubs or gallops. No JVD. 2+ pulses bilaterally - dorsalis pedis and radial.  Lungs:  lung clear. No wheezes. No accessory muscle use or cyanosis. No tenderness to palpation.  Abdomen:  Normoactive bowel sounds. Soft, flat, non-tender, and non-distended. No hepatosplenomegaly; liver span approximately 10 cm.  Skin:  Warm, dry, well-perfused. No rashes or other lesions.  Extremities:  2+ pulses in upper and lower extremities. No lower extremity pain or edema; legs are symmetric in appearance.  Neuro:  Alert and oriented to person, place, and time. Able to communicate well. 5/5 strength in all extremities bilaterally. Sensation intact in all extremities. Normal gait.        Lab Results   Component Value    CR 1.39     HGB 14.3     ALBUMIN 4.2    BNP 10           Impression:   74 y.o. y/o male with severe, symptomatic aortic stenosis. He reports getting tired when walking at his yard  and taking care of the garden. We will proceed TAVR workup for this patient.    Plan:   - He will need LHC with his cardiologist  - We will get CT TAVR protocol     The overall decision regarding the best treatment for this condition is complex.  We discussed options of both surgical aortic valve replacement and transcatheter aortic valve replacement along with risks and benefits involved with both of them in detail.    We discussed all the risks associated with the procedure, including but not limited to stroke, MI, pericardial tamponade, vascular complications, infection and death were discussed with the patient. The risk of needing a permament pacemaker was also discussed in detail. The patient verbalized understanding and decided to proceed with the procedure.     We will discuss this patient's case at our Valve Team meeting with representatives from [facility]. Our nurse navigators will contact patient with further diagnostic needs and formal plan.          [1]   Family History  Problem Relation Name Age of Onset    Hypertension Mother      Other (abdominal aortic aneurysm) Father      Hyperlipidemia Father      Hypertension Father     [2]   Allergies  Allergen Reactions    Cerivastatin Other     Back/neck stiffness    Jardiance [Empagliflozin] Unknown    Mounjaro [Tirzepatide] GI Upset    Nifedipine Other     Urinary urgency    Statins-Hmg-Coa Reductase Inhibitors Other     Joint pain/stiffness    Icosapent Ethyl Palpitations

## 2025-04-21 NOTE — PROGRESS NOTES
[facility] Questionnaire      1  Heart failure affects different people in different ways. Some feel shortness of breath while others feel fatigue. Please indicate how much you are limited by heart failure (shortness of breath or fatigue) in your ability to do the following activities over the past 2 weeks. PRE PROCEDURE    A.) Showering/bathing  5. Not at All  B.) Walking 1 block on level ground 5. Not at All  C.) Hurrying or Jogging   4. Slightly    2.  Over the past 2 weeks, how many times did you have swelling in your feet, ankles or legs when you woke up in the morning? 3. 1-2 times a week    3.  Over the past 2 weeks, on average, how many times has fatigue limited your ability to do what you wanted? 6. Less than once a week    4.  Over the past 2 weeks, on average, how many times has shortness of breath limited your ability to do what you wanted? 5. 1-2 times a week    5.  Over the past 2 weeks, on average, how many times have you been forced to sleep sitting up in a chair or with at least 3 pillows to prop you up because of shortness of breath? Never    6. Over the past 2 weeks, how much has your heart failure limited your enjoyment of life? It has slightly limited my enjoyment of life    7. If you had to spend the rest of your life with your heart failure the way it is right now, how would you feel about this? 3. Somewhat satisfied    8. How much does your heart failure affect your lifestyle? Please indicate how your heart failure may have limited yourparticipation in the following activities over the past 2 weeks    A.)  Hobbies, recreational activities  4. Slightly limited    B.) Working or doing household chores  4. Slightly limited    C.) Visiting family or friends out of your home  5. Did not limit at all    5 Meter Walk 8 seconds

## 2025-04-22 PROBLEM — I35.0 NONRHEUMATIC AORTIC VALVE STENOSIS: Status: ACTIVE | Noted: 2025-04-21

## 2025-04-22 NOTE — PROGRESS NOTES
PCP: TONY Cerna-CNP  Cards: Dr. Soni    HPI    74 y.o. y/o male with PMH of hypertension, Atypical atrial flutter, TIA, Hyperlipidemia, paroxysmal atrial fibrillation, CAD presents for evaluation of severe, symptomatic aortic stenosis.  Patient relates a 6 months history of worsening fatigue, SOB, HERNDON. He reports getting tired when walking at his yard and taking care of the garden. He also gets extremely tired when wash the car and with exertion.  Gradually doing less and less activity secondary to symptoms.      Denies overt orthopnea, PND, exertional CP.  He denies dizziness.  Denies syncope or presyncope.  Denies increased abdominal girth, edema.    Full 14 point ROS complete and negative except as noted above.     TAVR Workup:   - Echo: EF 76%, mod-severe aortic stenosis, AV mean gradient 90/55, AV Vmax 4.74, MICHAEL 0.93   - EKG:Normal sinus rhythm, , QRS 78  - NYHA: 2  - LHC: Pending  - CT TAVR: 04/21/2025  - dental clearance: regular dentist visits q6 months, no issues.   - EFT 0/5  - STS   Procedure Type: Isolated AVR  Perioperative Outcome Estimate %  Operative Mortality 1.01%  Morbidity & Mortality 5.55%  Stroke 0.674%  Renal Failure 0.846%  Reoperation 3.74%  Prolonged Ventilation 2.5%  Deep Sternal Wound Infection 0.047%  Long Hospital Stay (>14 days) 2.47%  Short Hospital Stay (<6 days) 55.7%      Social History     Tobacco Use    Smoking status: Never     Passive exposure: Never    Smokeless tobacco: Never   Substance Use Topics    Alcohol use: Not Currently        [Family History]     [Family History]  Problem Relation Name Age of Onset    Hypertension Mother      Other (abdominal aortic aneurysm) Father      Hyperlipidemia Father      Hypertension Father         [RX Allergies]     [RX Allergies]  Allergies  Allergen Reactions    Cerivastatin Other     Back/neck stiffness    Jardiance [Empagliflozin] Unknown    Mounjaro [Tirzepatide] GI Upset    Nifedipine Other     Urinary urgency     Statins-Hmg-Coa Reductase Inhibitors Other     Joint pain/stiffness    Icosapent Ethyl Palpitations       Current Outpatient Medications   Medication Instructions    atorvastatin (LIPITOR) 80 mg, oral, Daily    Blood glucose monitoring meter kit kit 1 each, As needed    blood sugar diagnostic (True Metrix Glucose Test Strip) strip 100 strips, miscellaneous, Daily    cholecalciferol (Vitamin D-3) 50 mcg (2,000 unit) capsule 1 capsule, Daily    dilTIAZem ER (TIAZAC) 300 mg, oral, Daily    Eliquis 5 mg, oral, 2 times daily    FreeStyle Jaun 3 [place] misc Use as instructed    FreeStyle Ajun 3 Sensor device Change sensor every 14 days as directed. Rotate sites.    lancets misc 100 Lancets, miscellaneous, Daily    losartan (COZAAR) 100 mg, oral, Daily    metFORMIN (GLUCOPHAGE) 1,000 mg, oral, 2 times daily    metoprolol succinate XL (TOPROL-XL) 25 mg, oral, Daily    pantoprazole (PROTONIX) 40 mg, oral, Daily    predniSONE (Deltasone) 20 mg tablet Take one pill twice daily x 3 days TAKE AFTER EATING    semaglutide 0.5 mg, subcutaneous, Weekly        Vitals:    04/21/25 1001   BP: 143/70   Pulse:    SpO2:         Physical Exam:     General:  Alert, calm, well-developed   HEENT:  Pupils equal, round, reactive to light and accommodation. Extraocular movements   Neck:  Supple, without lymphadenopathy or thyromegaly. No carotid bruits.  Lymph:  No axillary, cervical, supraclavicular, pre-auricular, submental, or occipital lymphadenopathy,  Cardiovascular:  Regular rate and rhythm, with normal S1 and S2. Aortic murmurs 3/6, no rubs or gallops. No JVD. 2+ pulses bilaterally - dorsalis pedis and radial.  Lungs:  lung clear. No wheezes. No accessory muscle use or cyanosis. No tenderness to palpation.  Abdomen:  Normoactive bowel sounds. Soft, flat, non-tender, and non-distended. No hepatosplenomegaly; liver span approximately 10 cm.  Skin:  Warm, dry, well-perfused. No rashes or other lesions.  Extremities:  2+ pulses in upper  and lower extremities. No lower extremity pain or edema; legs are symmetric in appearance.  Neuro:  Alert and oriented to person, place, and time. Able to communicate well. 5/5 strength in all extremities bilaterally. Sensation intact in all extremities. Normal gait.        Lab Results   Component Value    CR 1.39     HGB 14.3     ALBUMIN 4.2    BNP 10           Impression:   74 y.o. y/o male with severe, symptomatic aortic stenosis. He reports getting tired when walking at his yard and taking care of the garden. We will proceed TAVR workup for this patient.    Plan:   - He will need LHC with his cardiologist  - We will get CT TAVR protocol     The overall decision regarding the best treatment for this condition is complex.  We discussed options of both surgical aortic valve replacement and transcatheter aortic valve replacement along with risks and benefits involved with both of them in detail.    We discussed all the risks associated with the procedure, including but not limited to stroke, MI, pericardial tamponade, vascular complications, infection and death were discussed with the patient. The risk of needing a permament pacemaker was also discussed in detail. The patient verbalized understanding and decided to proceed with the procedure.     We will discuss this patient's case at our Valve Team meeting with representatives from [facility]. Our nurse navigators will contact patient with further diagnostic needs and formal plan.

## 2025-05-09 ENCOUNTER — HOSPITAL ENCOUNTER (OUTPATIENT)
Facility: HOSPITAL | Age: 75
Setting detail: OUTPATIENT SURGERY
Discharge: HOME | End: 2025-05-09
Attending: INTERNAL MEDICINE | Admitting: INTERNAL MEDICINE
Payer: COMMERCIAL

## 2025-05-09 VITALS
DIASTOLIC BLOOD PRESSURE: 74 MMHG | BODY MASS INDEX: 34.07 KG/M2 | WEIGHT: 230 LBS | HEART RATE: 44 BPM | SYSTOLIC BLOOD PRESSURE: 128 MMHG | HEIGHT: 69 IN | TEMPERATURE: 96.8 F | RESPIRATION RATE: 18 BRPM | OXYGEN SATURATION: 97 %

## 2025-05-09 DIAGNOSIS — I35.0 NONRHEUMATIC AORTIC VALVE STENOSIS: Primary | ICD-10-CM

## 2025-05-09 DIAGNOSIS — I25.10 CAD (CORONARY ARTERY DISEASE): ICD-10-CM

## 2025-05-09 PROCEDURE — C1760 CLOSURE DEV, VASC: HCPCS | Performed by: INTERNAL MEDICINE

## 2025-05-09 PROCEDURE — 93458 L HRT ARTERY/VENTRICLE ANGIO: CPT | Performed by: INTERNAL MEDICINE

## 2025-05-09 PROCEDURE — 2720000007 HC OR 272 NO HCPCS: Performed by: INTERNAL MEDICINE

## 2025-05-09 PROCEDURE — 99152 MOD SED SAME PHYS/QHP 5/>YRS: CPT | Performed by: INTERNAL MEDICINE

## 2025-05-09 PROCEDURE — 93454 CORONARY ARTERY ANGIO S&I: CPT | Performed by: INTERNAL MEDICINE

## 2025-05-09 PROCEDURE — 2500000001 HC RX 250 WO HCPCS SELF ADMINISTERED DRUGS (ALT 637 FOR MEDICARE OP): Performed by: NURSE PRACTITIONER

## 2025-05-09 PROCEDURE — 2550000001 HC RX 255 CONTRASTS: Mod: JW | Performed by: INTERNAL MEDICINE

## 2025-05-09 PROCEDURE — 7100000010 HC PHASE TWO TIME - EACH INCREMENTAL 1 MINUTE: Performed by: INTERNAL MEDICINE

## 2025-05-09 PROCEDURE — 2500000004 HC RX 250 GENERAL PHARMACY W/ HCPCS (ALT 636 FOR OP/ED): Performed by: INTERNAL MEDICINE

## 2025-05-09 PROCEDURE — 2500000005 HC RX 250 GENERAL PHARMACY W/O HCPCS: Performed by: INTERNAL MEDICINE

## 2025-05-09 PROCEDURE — C1894 INTRO/SHEATH, NON-LASER: HCPCS | Performed by: INTERNAL MEDICINE

## 2025-05-09 PROCEDURE — 7100000009 HC PHASE TWO TIME - INITIAL BASE CHARGE: Performed by: INTERNAL MEDICINE

## 2025-05-09 PROCEDURE — 99222 1ST HOSP IP/OBS MODERATE 55: CPT | Performed by: NURSE PRACTITIONER

## 2025-05-09 PROCEDURE — C1769 GUIDE WIRE: HCPCS | Performed by: INTERNAL MEDICINE

## 2025-05-09 RX ORDER — SODIUM CHLORIDE 9 MG/ML
1.5 INJECTION, SOLUTION INTRAVENOUS CONTINUOUS
Status: DISCONTINUED | OUTPATIENT
Start: 2025-05-09 | End: 2025-05-09 | Stop reason: HOSPADM

## 2025-05-09 RX ORDER — SODIUM CHLORIDE 9 MG/ML
100 INJECTION, SOLUTION INTRAVENOUS CONTINUOUS
Status: DISCONTINUED | OUTPATIENT
Start: 2025-05-09 | End: 2025-05-09

## 2025-05-09 RX ORDER — NAPROXEN SODIUM 220 MG/1
324 TABLET, FILM COATED ORAL ONCE
Status: COMPLETED | OUTPATIENT
Start: 2025-05-09 | End: 2025-05-09

## 2025-05-09 RX ORDER — ASPIRIN 81 MG/1
81 TABLET ORAL DAILY
Qty: 90 TABLET | Refills: 3 | Status: SHIPPED | OUTPATIENT
Start: 2025-05-10

## 2025-05-09 RX ORDER — ACETAMINOPHEN 160 MG/5ML
650 SOLUTION ORAL EVERY 6 HOURS PRN
Status: DISCONTINUED | OUTPATIENT
Start: 2025-05-09 | End: 2025-05-09 | Stop reason: HOSPADM

## 2025-05-09 RX ORDER — DEXTROSE 50 % IN WATER (D50W) INTRAVENOUS SYRINGE
12.5
Status: DISCONTINUED | OUTPATIENT
Start: 2025-05-09 | End: 2025-05-09 | Stop reason: HOSPADM

## 2025-05-09 RX ORDER — ACETAMINOPHEN 325 MG/1
650 TABLET ORAL EVERY 6 HOURS PRN
Status: DISCONTINUED | OUTPATIENT
Start: 2025-05-09 | End: 2025-05-09 | Stop reason: HOSPADM

## 2025-05-09 RX ORDER — ACETAMINOPHEN 650 MG/1
650 SUPPOSITORY RECTAL EVERY 6 HOURS PRN
Status: DISCONTINUED | OUTPATIENT
Start: 2025-05-09 | End: 2025-05-09 | Stop reason: HOSPADM

## 2025-05-09 RX ORDER — DEXTROSE 50 % IN WATER (D50W) INTRAVENOUS SYRINGE
25
Status: DISCONTINUED | OUTPATIENT
Start: 2025-05-09 | End: 2025-05-09 | Stop reason: HOSPADM

## 2025-05-09 RX ORDER — HEPARIN SODIUM 1000 [USP'U]/ML
INJECTION, SOLUTION INTRAVENOUS; SUBCUTANEOUS AS NEEDED
Status: DISCONTINUED | OUTPATIENT
Start: 2025-05-09 | End: 2025-05-09 | Stop reason: HOSPADM

## 2025-05-09 RX ORDER — MIDAZOLAM HYDROCHLORIDE 1 MG/ML
INJECTION, SOLUTION INTRAMUSCULAR; INTRAVENOUS AS NEEDED
Status: DISCONTINUED | OUTPATIENT
Start: 2025-05-09 | End: 2025-05-09 | Stop reason: HOSPADM

## 2025-05-09 RX ORDER — LIDOCAINE HYDROCHLORIDE 10 MG/ML
INJECTION, SOLUTION EPIDURAL; INFILTRATION; INTRACAUDAL; PERINEURAL AS NEEDED
Status: DISCONTINUED | OUTPATIENT
Start: 2025-05-09 | End: 2025-05-09 | Stop reason: HOSPADM

## 2025-05-09 RX ADMIN — ASPIRIN 324 MG: 81 TABLET, CHEWABLE ORAL at 07:31

## 2025-05-09 ASSESSMENT — ENCOUNTER SYMPTOMS
SHORTNESS OF BREATH: 1
CARDIOVASCULAR NEGATIVE: 1
GASTROINTESTINAL NEGATIVE: 1
FATIGUE: 1
MUSCULOSKELETAL NEGATIVE: 1
ALLERGIC/IMMUNOLOGIC NEGATIVE: 1
NEUROLOGICAL NEGATIVE: 1
HEMATOLOGIC/LYMPHATIC NEGATIVE: 1
PSYCHIATRIC NEGATIVE: 1
ENDOCRINE NEGATIVE: 1
EYES NEGATIVE: 1

## 2025-05-09 ASSESSMENT — PAIN - FUNCTIONAL ASSESSMENT
PAIN_FUNCTIONAL_ASSESSMENT: 0-10

## 2025-05-09 ASSESSMENT — PAIN SCALES - GENERAL
PAINLEVEL_OUTOF10: 0 - NO PAIN

## 2025-05-09 NOTE — H&P
History Of Present Illness  Franky Sauceda is a 74 y.o. male with PMHx significant for Atypical atrial flutter, TIA, Hyperlipidemia, paroxysmal atrial fibrillation, CAD presenting with severe aortic stenosis here for LHC as part of TAVR workup.     Last dose of Eliquis Tuesday PM.     Past Medical History:  Medical History[1]     Past Surgical History:  Surgical History[2]       Social History:  Social History[3]    Family History:  Family History[4]     Allergies:  RX Allergies[5]     Home Medications:  Current Outpatient Medications   Medication Instructions    atorvastatin (LIPITOR) 80 mg, oral, Daily    Blood glucose monitoring meter kit kit 1 each, As needed    blood sugar diagnostic (True Metrix Glucose Test Strip) strip 100 strips, miscellaneous, Daily    cholecalciferol (Vitamin D-3) 50 mcg (2,000 unit) capsule 1 capsule, Daily    dilTIAZem ER (TIAZAC) 300 mg, oral, Daily    Eliquis 5 mg, oral, 2 times daily    FreeStyle Jaun 3 Josephine misc Use as instructed    FreeStyle Jaun 3 Sensor device Change sensor every 14 days as directed. Rotate sites.    lancets misc 100 Lancets, miscellaneous, Daily    losartan (COZAAR) 100 mg, oral, Daily    metFORMIN (GLUCOPHAGE) 1,000 mg, oral, 2 times daily    metoprolol succinate XL (TOPROL-XL) 25 mg, oral, Daily    pantoprazole (PROTONIX) 40 mg, oral, Daily    semaglutide 0.5 mg, subcutaneous, Weekly       Inpatient Medications:  Scheduled Medications[6]  PRN Medications[7]  Continuous Medications[8]      Review of Systems   Constitutional:  Positive for fatigue.   HENT: Negative.     Eyes: Negative.    Respiratory:  Positive for shortness of breath.    Cardiovascular: Negative.    Gastrointestinal: Negative.    Endocrine: Negative.    Genitourinary: Negative.    Musculoskeletal: Negative.    Skin: Negative.    Allergic/Immunologic: Negative.    Neurological: Negative.    Hematological: Negative.    Psychiatric/Behavioral: Negative.            Physical Exam  General:   "Patient is awake, alert, and oriented.  Patient is in no acute distress.  HEENT:  Pupils equal and reactive.  Normocephalic.  Moist mucosa.    Neck:  No JVD.   Cardiovascular:  Bradycardic IRR. Normal S1 and S2.  +EMPERATRIZ 4/6. Radial pulses 1+.   Pulmonary:  Clear to auscultation bilaterally.  Abdomen:  Soft. Non-tender.   Non-distended.  Positive bowel sounds.  Lower Extremities:  Pedal pulses 1+ BLE edema 1+  Neurologic:  Cranial nerves II-XII grossly intact.   No focal deficit.   Skin: Skin warm and dry, no lesions. Normal skin turgor.   Psychiatric: Normal affect.     Sedation Plan    ASA 3     Mallampati class: III.    Risks, benefits, and alternatives discussed with patient.         NPO since 0000    Last Recorded Vitals  Blood pressure 144/79, pulse (!) 44, temperature 36 °C (96.8 °F), temperature source Tympanic, resp. rate 18, height 1.753 m (5' 9\"), weight 104 kg (230 lb), SpO2 98%.         Vitals from the Past 24 Hours  Heart Rate:  [44]   Temp:  [36 °C (96.8 °F)]   Resp:  [18]   BP: (144)/(79)   Height:  [175.3 cm (5' 9\")]   Weight:  [104 kg (230 lb)]   SpO2:  [97 %-98 %]          Relevant Results    Labs    POCT Glucose:      POCT Urine Pregnancy:       CBC:   Recent Labs     04/16/25  1243 10/14/24  1033 08/03/24  0640 08/02/24  1546 02/21/24  1301 10/12/23  1251   WBC 7.8 8.2 4.6 5.0 7.9 9.8   HGB 14.3 13.5 13.3* 14.5 14.6 14.3   HCT 42.0 41.0 40.0* 43.5 45.5 43.8    237 209 222 248 253   MCV 91.7 94 91 92 94 94     BMP/CMP:   Recent Labs     04/16/25  1243 08/03/24  0640 08/02/24  1546 04/16/24  1227 02/21/24  1301 10/12/23  1251 04/12/23  1157    138 139 141 140 141 139   K 4.9 4.0 4.5 4.6 4.7 4.7 4.9    105 105 106 106 106 105   BUN 25 17 19 20 22 18 21   CREATININE 1.39* 1.08 1.32* 1.19 1.24 1.23 1.15   CO2 24 22 23 27 26 27 24   CALCIUM 9.5 8.2* 9.0 9.3 9.3 9.6 9.2   PROT 6.5  --  7.0 6.5 6.9 6.5 6.7   BILITOT 1.0  --  0.9 1.3* 1.3* 1.2 1.1   ALKPHOS 90  --  113 91 115 95 102   ALT " "14  --  34 17 17 18 16   AST 14  --  28 13 16 20 16   GLUCOSE 112* 106* 80 116* 89 78 125*      Magnesium: No results for input(s): \"MG\" in the last 89800 hours.  Lipid Panel:   Recent Labs     04/16/25  1243 10/14/24  1033 08/03/24  0640 10/12/23  1251 04/12/23  1043   CHOL 146 128 97 147 153   HDL 40 37.2 24.3 43.1 36   CHHDL 3.7 3.4 4.0 3.4 4.2   LDL  --   --   --   --  116   VLDL  --  26 25 35  --    TRIG 165* 130 124 177* 188*   NHDL 106 91 73 104  --      Cardiac       No lab exists for component: \"CK\", \"CKMBP\"   Hemoglobin A1C:   Recent Labs     04/16/25  1243 01/17/25  1043 10/14/24  1033 08/02/24  1546 04/16/24  1227 02/21/24  1301 10/12/23  1251 04/12/23  1044   HGBA1C 7.4* 6.1* 6.0* 5.7* 6.7* 6.3* 6.2* 6.1     TSH/ Free T4:   Recent Labs     10/14/24  1033 08/02/24  1546 10/12/23  1251 04/12/23  1157 03/09/22  1309 05/26/21  1109 06/17/19  1321   TSH 2.36 1.61 2.20 2.23 2.31 2.24 1.80     Iron:   Recent Labs     08/02/24  1546   BNP 10     Coag:     ABO: No results found for: \"ABO\"    Past Cardiology Tests (Last 3 Years):    EKG:  Recent Labs     08/02/24  1549 02/23/21  1549   ATRRATE 71 61   VENTRATE 71 61   PRINT 176 184   QRSDUR 78 92   QTCFRED 431 436   QTCCALCB 443 436   No results found for this or any previous visit (from the past 4464 hours).  Echo:  Echocardiogram:   Transthoracic Echo (TTE) Complete 04/15/2025    [facility] [place], [place] [address] [employer] [place] [id number]  Tel [phone] and Fax [phone]    TRANSTHORACIC ECHOCARDIOGRAM REPORT      Patient Name:       ALISHA You Physician:    [id number] Kaylie Xiong MD  Study Date:         4/15/2025           Ordering Provider:    [id number] KAYLIE XIONG  MRN/PID:            [id number]            Fellow:  Accession#:         [id number]        Nurse:  Date of Birth/Age:  1950 / 74      Sonographer:          Kiera faye               "                       RDCS  Gender assigned at  M                   Additional Staff:  Birth:  Height:             175.26 cm           Admit Date:           4/15/2025  Weight:             103.42 kg           Admission Status:     Outpatient  BSA / BMI:          2.18 m2 / 33.67     Encounter#:           [id number]  kg/m2  Blood Pressure:     /                   Department Location:  [facility]    Study Type:    TRANSTHORACIC ECHO (TTE) COMPLETE  Diagnosis/ICD: Nonrheumatic aortic (valve) stenosis-I35.0  Indication:    AS  CPT Code:      Echo Complete w Full Doppler-65100  Study Detail: The following Echo studies were performed: 2D, M-Mode, Doppler and  color flow. A bubble study was not performed.      PHYSICIAN INTERPRETATION:  Left Ventricle: The left ventricular systolic function is hyperdynamic, with a Vasquez's biplane calculated ejection fraction of 76%. There are no regional left ventricular wall motion abnormalities. The left ventricular cavity size is normal. There is normal septal and normal posterior left ventricular wall thickness. Left ventricular diastolic filling was not assessed.  Left Atrium: The left atrium is mildly dilated. A bubble study using agitated saline was not performed.  Right Ventricle: The right ventricle is normal in size. There is normal right ventricular global systolic function.  Right Atrium: The right atrium is normal in size.  Aortic Valve: The aortic valve is trileaflet. There is severe aortic valve cusp calcification. There is severe aortic valve thickening. There is reduced systolic aortic valve leaflet excursion. There is evidence of severe aortic valve stenosis. The aortic valve dimensionless index is 0.30. There is mild aortic valve regurgitation. The peak instantaneous gradient of the aortic valve is 90 mmHg. The mean gradient of the aortic valve is 55 mmHg.  Mitral Valve: The mitral valve is mildly thickened. There is mild mitral annular calcification. There is mild  mitral valve regurgitation.  Tricuspid Valve: The tricuspid valve is structurally normal. There is mild tricuspid regurgitation.  Pulmonic Valve: The pulmonic valve is structurally normal. There is physiologic pulmonic valve regurgitation.  Pericardium: There is no pericardial effusion noted.  Aorta: The aortic root is normal.  Pulmonary Artery: The tricuspid regurgitant velocity is 3.17 m/s, and with an estimated right atrial pressure of 3 mmHg, the estimated pulmonary artery pressure is mild to moderately elevated with the RVSP at 43.2 mmHg.  In comparison to the previous echocardiogram(s): Compared with study dated 4/2/2024, AS has progressed. RVSP has increased.      CONCLUSIONS:  1. The left ventricular systolic function is hyperdynamic, with a Vasquez's biplane calculated ejection fraction of 76%.  2. The left atrium is mildly dilated.  3. Severe aortic valve stenosis.  4. There is severe aortic valve cusp calcification.  5. There is severe aortic valve thickening.  6. Mild aortic valve regurgitation.  7. Mild to moderately elevated pulmonary artery pressure.    QUANTITATIVE DATA SUMMARY:    2D MEASUREMENTS:          Normal Ranges:  LAs:             4.11 cm  (2.7-4.0cm)  RVIDd:           1.98 cm  (0.9-3.6cm)  IVSd:            1.03 cm  (0.6-1.1cm)  LVPWd:           0.98 cm  (0.6-1.1cm)  LVIDd:           5.36 cm  (3.9-5.9cm)  LVIDs:           2.91 cm  LV Mass Index:   94 g/m2  LVEDV Index:     42 ml/m2  LV % FS          45.7 %      LEFT ATRIUM:                  Normal Ranges:  LA Vol A4C:        70.9 ml    (22+/-6mL/m2)  LA Vol A2C:        89.2 ml  LA Vol BP:         81.4 ml  LA Vol Index A4C:  32.5ml/m2  LA Vol Index A2C:  40.8 ml/m2  LA Vol Index BP:   37.3 ml/m2  LA Area A4C:       23.1 cm2  LA Area A2C:       25.3 cm2  LA Major Axis A4C: 6.4 cm  LA Major Axis A2C: 6.1 cm  LA Vol A4C:        65.8 ml  LA Vol A2C:        84.2 ml  LA Vol Index BSA:  34.3 ml/m2      RIGHT ATRIUM:                 Normal  Ranges:  RA Vol A4C:        29.2 ml    (8.3-19.5ml)  RA Vol Index A4C:  13.4 ml/m2  RA Area A4C:       13.5 cm2  RA Major Axis A4C: 5.3 cm      LV SYSTOLIC FUNCTION:  Normal Ranges:  EF-A4C View:    79 % (>=55%)  EF-A2C View:    70 %  EF-Biplane:     76 %  LV EF Reported: 76 %      LV DIASTOLIC FUNCTION:           Normal Ranges:  MV e'                  0.103 m/s (>8.0)  MV lateral e'          0.11 m/s  MV medial e'           0.10 m/s      AORTIC VALVE:                      Normal Ranges:  AoV Vmax:                4.74 m/s  (<=1.7m/s)  AoV Peak P.7 mmHg (<20mmHg)  AoV Mean P.1 mmHg (1.7-11.5mmHg)  LVOT Max Yon:            1.44 m/s  (<=1.1m/s)  AoV VTI:                 120.70 cm (18-25cm)  LVOT VTI:                35.64 cm  LVOT Diameter:           2.00 cm   (1.8-2.4cm)  AoV Area, VTI:           0.93 cm2  (2.5-5.5cm2)  AoV Area,Vmax:           0.95 cm2  (2.5-4.5cm2)  AoV Dimensionless Index: 0.30      RIGHT VENTRICLE:  RV Basal 2.90 cm  RV Major 7.7 cm  TAPSE:   24.0 mm  RV s'    0.15 m/s      TRICUSPID VALVE/RVSP:          Normal Ranges:  Peak TR Velocity:     3.17 m/s  Est. RA Pressure:     3 mmHg  RV Syst Pressure:     43 mmHg  (< 30mmHg)      PULMONIC VALVE:          Normal Ranges:  PV Accel Time:  103 msec (>120ms)  PV Max Yon:     1.0 m/s  (0.6-0.9m/s)  PV Max P.1 mmHg      AORTA:  Asc Ao Diam 3.37 cm      [id number] Loy Soni MD  Electronically signed on 4/15/2025 at 1:24:21 PM        ** Final **    Ejection Fractions:  LV EF   Date/Time Value Ref Range Status   04/15/2025 11:16 AM 76 %      Cath:  Coronary Angiography: No results found for this or any previous visit from the past 1800 days.    Right Heart Cath: No results found for this or any previous visit from the past 1800 days.    Stress Test:  Nuclear:No results found for this or any previous visit from the past  days.    Metabolic Stress: No results found for this or any previous visit from the past   days.    Cardiac Imaging:  Cardiac Scoring: No results found for this or any previous visit from the past 1800 days.    Cardiac MRI: No results found for this or any previous visit from the past 1800 days.         Assessment/Plan  Assessment/Plan   Assessment & Plan  Nonrheumatic aortic valve stenosis        -[facility] 5/9/25 today with Dr. Soni  - mg PO pre-procedure        NP discussed with Dr. Soni regarding plan of care/ discharge plan      I spent 30 minutes in the professional and overall care of this patient.      aJnessa Reyes, APRN-CNP         [1]   Past Medical History:  Diagnosis Date    Bence Huang proteinuria 07/27/2013    Bence Huang proteinuria    Coronary artery disease involving native coronary artery of native heart without angina pectoris 04/02/2024    CAC 2101 (2017)    Dorsalgia, unspecified 09/27/2016    Back pain, acute    Encounter for immunization 06/29/2018    Need for shingles vaccine    Fatigue 04/10/2023    Hyperlipidemia     Hypertension     Localized edema 07/07/2022    Edema of extremities    Moderate aortic stenosis 04/02/2024    PAF (paroxysmal atrial fibrillation) (Multi) 04/02/2024    Pain in left knee 12/08/2021    Acute pain of left knee    Pain in unspecified knee 04/28/2020    Joint pain, knee    Personal history of other diseases of the circulatory system     Personal history of coronary atherosclerosis    Personal history of other diseases of urinary system 07/07/2022    History of renal insufficiency syndrome    Personal history of other infectious and parasitic diseases 05/14/2018    History of herpes labialis    Severe aortic stenosis 04/02/2024    Sprain of other specified parts of left knee, initial encounter 01/02/2020    Injury of meniscus of left knee, initial encounter    Unilateral primary osteoarthritis, left knee 08/20/2019    Arthritis of knee, left   [2]   Past Surgical History:  Procedure Laterality Date    CHOLECYSTECTOMY  01/28/2014     Cholecystectomy    COLONOSCOPY  01/28/2014    Complete Colonoscopy    ESOPHAGOGASTRODUODENOSCOPY  01/28/2014    Diagnostic Esophagogastroduodenoscopy   [3]   Social History  Tobacco Use    Smoking status: Never     Passive exposure: Never    Smokeless tobacco: Never   Vaping Use    Vaping status: Never Used   Substance Use Topics    Alcohol use: Not Currently    Drug use: Never   [4]   Family History  Problem Relation Name Age of Onset    Hypertension Mother      Other (abdominal aortic aneurysm) Father      Hyperlipidemia Father      Hypertension Father     [5]   Allergies  Allergen Reactions    Cerivastatin Other     Back/neck stiffness    Jardiance [Empagliflozin] Unknown    Mounjaro [Tirzepatide] GI Upset    Nifedipine Other     Urinary urgency    Statins-Hmg-Coa Reductase Inhibitors Other     Joint pain/stiffness    Icosapent Ethyl Palpitations   [6]   Scheduled medications   Medication Dose Route Frequency    aspirin  324 mg oral Once   [7]   PRN medications   Medication    dextrose    dextrose    glucagon    glucagon   [8]   Continuous Medications   Medication Dose Last Rate    sodium chloride 0.9%  100 mL/hr

## 2025-05-09 NOTE — POST-PROCEDURE NOTE
Physician Transition of Care Summary  Invasive Cardiovascular Lab    Procedure Date: 5/9/2025  Attending:    * Loy Soni - Primary  Resident/Fellow/Other Assistant: Surgeons and Role:  * No surgeons found with a matching role *    Indications:   Pre-op Diagnosis      * Nonrheumatic aortic valve stenosis [I35.0]    Post-procedure diagnosis:   Post-op Diagnosis     * Nonrheumatic aortic valve stenosis [I35.0]    Procedure(s):   [facility] [place] [place]  [address] - DE CATH PLMT L HRT & ARTS W/NJX & ANGIO IMG S&I        Procedure Findings:   Severe LMT and RCA    Description of the Procedure:   Right radial LHC    Complications:   None    Stents/Implants:   Implants       No implant documentation for this case.            Anticoagulation/Antiplatelet Plan:   Eliquis and ASA    Estimated Blood Loss:   5 mL    Anesthesia: Moderate Sedation Anesthesia Staff: No anesthesia staff entered.    Any Specimen(s) Removed:   No specimens collected during this procedure.    Disposition:   Home      Electronically signed by: Loy Soni MD, 5/9/2025 8:50 AM

## 2025-05-09 NOTE — DISCHARGE INSTRUCTIONS
Resume Eliquis tomorrow 5/10/25    Start Aspirin 81 mg daily 5/10/25. Prescription sent to [facility].     Resume Metformin Sunday 5/11/25    Dr. Soni will communicate results of heart catheterization to Dr. Reyes and Dr. Molina. Their office will contact you regarding next steps.             CARDIAC CATHETERIZATION DISCHARGE INSTRUCTIONS     FOR SUDDEN AND SEVERE CHEST PAIN, SHORTNESS OF BREATH, EXCESSIVE BLEEDING, SIGNS OF STROKE, OR CHANGES IN MENTAL STATUS YOU SHOULD CALL 911 IMMEDIATELY.     If your provider has prescribed aspirin and/or clopidogrel (Plavix), or prasugrel (Effient), or ticagrelor (Brilinta), DO NOT STOP THESE MEDICATIONS for any reason without talking to your cardiologist first. If any of these were prescribed, you must take them every day without missing a single dose. If you are getting low on these medications, contact your provider immediately for a refill.     FOR NEXT 24 HOURS  - Upon discharge, you should return home and rest for the remainder of the day and evening. You do not have to stay on bed rest but should not be very active.  It is recommended a responsible adult be with you for the first 24 hours after the procedure.    - No driving for 24 hours after procedure. Please arrange for someone to drive you home from the hospital today.     - Do not drive, operate machinery, or use power tools for 24 hours after your procedure.     - Do not make any legal decisions for 24 hours after your procedure.     - Do not drink alcoholic beverages for 24 hours after your procedure.    -Stay extra hydrated for 24 hours after your procedure; goal fluid intake around 64-72 ounces for the next 24 hours.       WOUND CARE   *FOR FEMORAL (LEG) ACCESS*  ·      Avoid heavy lifting (over 10 pounds) for 7 days, squatting or excessive bending for 2 days, and strenuous exercise for 7 days.  ·      No submerged bathing, swimming, or hot tubs for the next 7 days, or until fully healed.  ·       Avoid sexual activity for 3-4 days until any groin discomfort has ceased.     *FOR RADIAL (WRIST) ACCESS*  ·      No lifting more than 5 pounds or excessive use of the wrist for 24 hours - for example, treat your wrist as if it is sprained.  ·      Do not engage in vigorous activities (tennis, golf, bowling, weights) for at least 48 hours after the procedure.  ·      Do not submerge the wrist for 7 days after the procedure.  ·      You should expect mild tingling in your hand and tenderness at the puncture site for up to 3 days.    - The transparent dressing should be removed from the site 24 hours after the procedure.  Wash the site gently with soap and water. Rinse well and pat dry. Keep the area clean and dry. You may apply a Band-Aid to the site. Avoid lotions, ointments, or powders until fully healed.     - You may shower the day after your procedure.      - It is normal to notice a small bruise around the puncture site and/or a small grape sized or smaller lump. Any large bruising or large lump warrants a call to the office.     - If bleeding should occur, lay down and apply pressure to the affected area for 10 minutes.  If the bleeding stops notify your physician.  If there is a large amount of bleeding or spurting of blood CALL 911 immediately.  DO NOT drive yourself to the hospital.    - You may experience some tenderness, bruising or minimal inflammation.  If you have any concerns, you may contact the Cath Lab or if any of these symptoms become excessive, contact your cardiologist or go to the emergency room.     OTHER INSTRUCTIONS  - You may take acetaminophen (Tylenol) as directed for discomfort.  If pain is not relieved with acetaminophen (Tylenol), contact your doctor.    - If you notice or experience any of the following, you should notify your doctor or seek medical attention  Chest pain or discomfort  Change in mental status or weakness in extremities.  Dizziness, light headedness, or feeling  faint.  Change in the site where the procedure was performed, such as bleeding or an increased area of bruising or swelling.  Tingling, numbness, pain, or coolness in the leg/arm beyond the site where the procedure was performed.  Signs of infection (i.e. shaking chills, temperature > 100 degrees Fahrenheit, warmth, redness) in the leg/arm area where the procedure was performed.  Changes in urination   Bloody or black stools  Vomiting blood  Severe nose bleeds  Any excessive bleeding    - If you DO NOT have an appointment with your cardiologist within 2-4 weeks following your procedure, please contact their office.

## 2025-05-12 ENCOUNTER — TELEPHONE (OUTPATIENT)
Dept: CARDIOLOGY | Facility: HOSPITAL | Age: 75
End: 2025-05-12
Payer: COMMERCIAL

## 2025-05-12 ENCOUNTER — CARDIOLOGY CONFERENCE (OUTPATIENT)
Dept: CARDIOLOGY | Facility: HOSPITAL | Age: 75
End: 2025-05-12
Payer: COMMERCIAL

## 2025-05-12 NOTE — TELEPHONE ENCOUNTER
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 He reported he already received call from office & has appt. scheduled.

## 2025-05-13 ENCOUNTER — APPOINTMENT (OUTPATIENT)
Dept: PHARMACY | Facility: HOSPITAL | Age: 75
End: 2025-05-13
Payer: COMMERCIAL

## 2025-05-13 ENCOUNTER — TELEPHONE (OUTPATIENT)
Dept: PRIMARY CARE | Facility: CLINIC | Age: 75
End: 2025-05-13

## 2025-05-13 DIAGNOSIS — E11.9 TYPE 2 DIABETES MELLITUS WITHOUT COMPLICATION, WITHOUT LONG-TERM CURRENT USE OF INSULIN: ICD-10-CM

## 2025-05-13 DIAGNOSIS — I25.10 ATHEROSCLEROTIC CARDIOVASCULAR DISEASE: ICD-10-CM

## 2025-05-13 NOTE — PROGRESS NOTES
Clinical Pharmacy Appointment    Patient ID: Franky Sauceda is a 74 y.o. male who presents for Diabetes.    Pt is here for Follow Up appointment.     Referring Provider: Kay Lucas APRN-C*  PCP: CARLOS Cerna   Last visit with PCP: 4/16/2025  Next visit with PCP: 10/16/2025    INTERVAL HISTORY   Patient's last appointment with clinical pharmacy team on 4/7/2025  Recent hospitalizations? Yes  Planned admission 5/9 for pre-op evaluation  Medication changes? Yes  Patient started on aspirin and recently completed course of prednisone  Missed doses of medications? Yes  Doses of metformin and Eliquis held as instructed  Side effects? Yes  Occasional mild nausea with Ozempic  Updated relevant labs? Yes  A1c increased to 7.4% (previously 6.1%)  Updated CMP and lipid panel reviewed  Changes to diet or exercise regimen? No      Subjective     HPI  DIABETES MELLITUS TYPE 2:    Diagnosed with diabetes: several years  Known diabetic complications: proteinuria  Does patient follow with Endocrinology: no  Last optometry exam: follows regularly  Most recent visit in Podiatry: no podiatrist     Current diabetic medications include:  metformin 500 mg twice daily  Ozempic 0.5 mg weekly    Historical diabetic medications include:  Jardiance - not tolerated due to side effects, frequent urination  Ozempic - switched to Mounjaro for improved weight loss  Mounjaro - stopped due to diarrhea    Glucose Readings:  Patient has continuous blood glucose monitoring via Freestyle Jaun 3 (uses reader)  He has not been using Jaun 3 due to frequent imaging and cardiac testing  He has glucometer for back-up use  He denies symptoms of hypoglycemia at this time    Lifestyle:  Diet: 2 meals/day, some snacks, limits sugar  Physical Activity: active around the house, landscaping  Alcohol Use: none    Weight Monitoring:  Previous weight 228 pounds at office visit 10/16/2024 (on Ozempic 0.5 mg)  Previous weight 230.4 pounds on home  scale 4/7/2025 (on Ozempic 0.5 mg)  Current weight 230 pounds at hospital visit 5/9/2025 (on Ozempic 0.5 mg)    Secondary Prevention:  Statin? Yes  ACE-I/ARB? Yes  Aspirin? No    Pertinent PMH Review:  PMH of Pancreatitis: No  PMH of Retinopathy: No  PMH of Urinary Tract Infections: No  PMH of MTC: No    Drug Interactions  Reviewed interaction of diltiazem and metoprolol - cardiology aware and does not want to adjust current regimen    Medication System Management  Patient's preferred pharmacy: [facility] in [place], [place]  Adherence/Organization: no concerns identified  Affordability/Accessibility: no concerns at this time      Objective   Allergies   Allergen Reactions    Cerivastatin Other     Back/neck stiffness    Jardiance [Empagliflozin] Unknown    Mounjaro [Tirzepatide] GI Upset    Nifedipine Other     Urinary urgency    Statins-Hmg-Coa Reductase Inhibitors Other     Joint pain/stiffness    Icosapent Ethyl Palpitations     Social History     Social History Narrative    Not on file      Medication Review  Current Outpatient Medications   Medication Instructions    aspirin 81 mg, oral, Daily    atorvastatin (LIPITOR) 80 mg, oral, Daily    Blood glucose monitoring meter kit kit 1 each, As needed    blood sugar diagnostic (True Metrix Glucose Test Strip) strip 100 strips, miscellaneous, Daily    cholecalciferol (Vitamin D-3) 50 mcg (2,000 unit) capsule 1 capsule, Daily    dilTIAZem ER (TIAZAC) 300 mg, oral, Daily    Eliquis 5 mg, oral, 2 times daily    FreeStyle Jaun 3 [place] misc Use as instructed    FreeStyle Jaun 3 Sensor device Change sensor every 14 days as directed. Rotate sites.    lancets misc 100 Lancets, miscellaneous, Daily    losartan (COZAAR) 100 mg, oral, Daily    metFORMIN (GLUCOPHAGE) 1,000 mg, oral, 2 times daily    metoprolol succinate XL (TOPROL-XL) 25 mg, oral, Daily    pantoprazole (PROTONIX) 40 mg, oral, Daily    semaglutide 0.5 mg, subcutaneous, Weekly      Vitals  BP Readings from  Last 2 Encounters:   05/09/25 128/74   04/21/25 143/70     BMI Readings from Last 1 Encounters:   05/09/25 33.97 kg/m²      Labs  A1C  Lab Results   Component Value Date    HGBA1C 7.4 (H) 04/16/2025    HGBA1C 6.1 (H) 01/17/2025    HGBA1C 6.0 (H) 10/14/2024     BMP  Lab Results   Component Value Date    CALCIUM 9.5 04/16/2025     04/16/2025    K 4.9 04/16/2025    CO2 24 04/16/2025     04/16/2025    BUN 25 04/16/2025    CREATININE 1.39 (H) 04/16/2025    EGFR 53 (L) 04/16/2025     LFTs  Lab Results   Component Value Date    ALT 14 04/16/2025    AST 14 04/16/2025    ALKPHOS 90 04/16/2025    BILITOT 1.0 04/16/2025     FLP  Lab Results   Component Value Date    TRIG 165 (H) 04/16/2025    CHOL 146 04/16/2025    LDLCALC 80 04/16/2025    HDL 40 04/16/2025     Urine Microalbumin  Lab Results   Component Value Date    MICROALBCREA 30 - 300 (A) 04/12/2023     Weight Management  Wt Readings from Last 3 Encounters:   05/09/25 104 kg (230 lb)   04/21/25 106 kg (234 lb 6.4 oz)   04/16/25 106 kg (233 lb)      There is no height or weight on file to calculate BMI.     Assessment/Plan   Problem List Items Addressed This Visit       Atherosclerotic cardiovascular disease    Relevant Medications    semaglutide 0.25 mg or 0.5 mg (2 mg/3 mL) pen injector    Diabetes mellitus (Multi)    Relevant Medications    semaglutide 0.25 mg or 0.5 mg (2 mg/3 mL) pen injector     Other Visit Diagnoses         Body mass index (BMI) 33.0-33.9, adult        Relevant Medications    semaglutide 0.25 mg or 0.5 mg (2 mg/3 mL) pen injector            DISCUSSION/PLAN:  Patient with controlled diabetes, most recent A1c of 7.4% in April 2025 (goal < 7.5% due to age and comorbidities). He endorses occasional mild nausea with Ozempic. He has held some metformin doses as instructed and removed Freestyle Jaun 3 sensor due to imaging. Patient is preparing for upcoming cardiac surgery for valve replacement and triple bypass. Plan to eventually increase  Ozempic dose for improved weight loss potential and better control of post-prandial levels, but will hold off until stable after surgery. Continue current diabetes regimen for now as A1c is at goal. Follow up in one month or sooner if needed.    CONTINUE  metformin 500 mg twice daily  Ozempic 0.5 mg under the skin once weekly on Saturdays    Future Considerations:  Ozempic dose titration    Monitoring and Education:  Counseled on mechanism of action, dosing, benefits, side effects, and monitoring of diabetes medications  Reviewed symptoms and treatment of hypoglycemia  Answered all patient questions    Continue all meds under the continuation of care with the referring provider and clinical pharmacy team.    Clinical Pharmacist follow-up: approximately one month, to be scheduled  Orders placed: Ozempic 0.5 mg to [facility] in [place], [place]    Thank you,   Lora Can, PharmD  Clinical Pharmacy Specialist, Primary Care   [phone]    Verbal consent to manage patient's drug therapy was obtained from the patient . Patient was informed he  may decline to participate or withdraw from participation in pharmacy services at any time.

## 2025-05-14 NOTE — TELEPHONE ENCOUNTER
Late Entry  6:18 pm  5/13    Call to Kathryn.  Reviewed current plan.    Pt to have CABG and Valve replacement  To meet next week with surgeon, Zaida Molina  .

## 2025-05-17 NOTE — PROGRESS NOTES
Valve and Structural Heart Multidisciplinary Meeting   This note reflects a summary of a case conference discussion between a multidisciplinary team of interventional cardiologists, cardiac surgeons, APPs, nurse navigators, and research team.  The suggestions and impressions in this note reflect group consensus opinion but do not substitute bedside evaluation and management by the primary team.     Attendees:  Dr. Phan Cooney, Dr. Delong, Dr. Gross, Dr. Haines, Matt Aguirre, Dr. Rashel Brand, Dr. Zurita, Dr. Broderick, Dr. Molina, Anna Marie Zaman, Caridad Chan, Amina Silveira, Dr. Doyle, Mounika Bains, Dr. Meneses.    Franky Sauceda is a 74 y.o. year old male  Medical record number: [id number]    Referring Provider Dr. Soni    Brief Clinical Summary - clinical presentation/comorbidities:  74 y.o. y/o male with PMH of hypertension, Atypical atrial flutter, TIA, Hyperlipidemia, paroxysmal atrial fibrillation, CAD presents for evaluation of severe, symptomatic aortic stenosis.  Patient relates a 6 months history of worsening fatigue, SOB, HERNDON. He reports getting tired when walking at his yard and taking care of the garden. He also gets extremely tired when wash the car and with exertion.    Valve and Structural Heart Work Up  - Echo: EF 76%, mod-severe aortic stenosis, AV mean gradient 90/55, AV Vmax 4.74, MICHAEL 0.93   - EKG:Normal sinus rhythm, , QRS 78  - NYHA: 2  - LHC: 5/9/2025  - CT TAVR: 04/21/2025 no acute incidentals for f/u  - dental clearance: regular dentist visits q6 months, no issues.   - EFT 0/5  - STS   Procedure Type: Isolated AVR  Perioperative OutcomeEstimate %  Operative Mortality1.01%  KCCQ/Walk done    Group consensus recommendation:   Patient with LM disease.  Patient with good targets for 3VCABG and AVR. Aorta is clean. Patient will be surgical Dr. Molina will contact patient.      To contact the  [facility] contact  Transfer Center or the office  [phone].

## 2025-05-21 ENCOUNTER — OFFICE VISIT (OUTPATIENT)
Dept: CARDIAC SURGERY | Facility: CLINIC | Age: 75
End: 2025-05-21
Payer: COMMERCIAL

## 2025-05-21 VITALS
WEIGHT: 235 LBS | OXYGEN SATURATION: 95 % | DIASTOLIC BLOOD PRESSURE: 77 MMHG | SYSTOLIC BLOOD PRESSURE: 147 MMHG | BODY MASS INDEX: 34.7 KG/M2 | HEART RATE: 54 BPM

## 2025-05-21 DIAGNOSIS — I65.23 BILATERAL CAROTID ARTERY STENOSIS: ICD-10-CM

## 2025-05-21 DIAGNOSIS — I35.0 SEVERE AORTIC STENOSIS: Primary | ICD-10-CM

## 2025-05-21 DIAGNOSIS — I25.10 CORONARY ARTERY DISEASE INVOLVING NATIVE CORONARY ARTERY OF NATIVE HEART WITHOUT ANGINA PECTORIS: ICD-10-CM

## 2025-05-21 PROCEDURE — 1159F MED LIST DOCD IN RCRD: CPT | Performed by: STUDENT IN AN ORGANIZED HEALTH CARE EDUCATION/TRAINING PROGRAM

## 2025-05-21 PROCEDURE — 4010F ACE/ARB THERAPY RXD/TAKEN: CPT | Performed by: STUDENT IN AN ORGANIZED HEALTH CARE EDUCATION/TRAINING PROGRAM

## 2025-05-21 PROCEDURE — 3044F HG A1C LEVEL LT 7.0%: CPT | Performed by: STUDENT IN AN ORGANIZED HEALTH CARE EDUCATION/TRAINING PROGRAM

## 2025-05-21 PROCEDURE — 99215 OFFICE O/P EST HI 40 MIN: CPT | Performed by: STUDENT IN AN ORGANIZED HEALTH CARE EDUCATION/TRAINING PROGRAM

## 2025-05-21 PROCEDURE — 1126F AMNT PAIN NOTED NONE PRSNT: CPT | Performed by: STUDENT IN AN ORGANIZED HEALTH CARE EDUCATION/TRAINING PROGRAM

## 2025-05-21 PROCEDURE — 3077F SYST BP >= 140 MM HG: CPT | Performed by: STUDENT IN AN ORGANIZED HEALTH CARE EDUCATION/TRAINING PROGRAM

## 2025-05-21 PROCEDURE — 3078F DIAST BP <80 MM HG: CPT | Performed by: STUDENT IN AN ORGANIZED HEALTH CARE EDUCATION/TRAINING PROGRAM

## 2025-05-21 RX ORDER — NITROGLYCERIN 0.4 MG/1
0.4 TABLET SUBLINGUAL EVERY 5 MIN PRN
Qty: 30 TABLET | Refills: 0 | Status: SHIPPED | OUTPATIENT
Start: 2025-05-21 | End: 2026-05-21

## 2025-05-21 ASSESSMENT — PAIN SCALES - GENERAL: PAINLEVEL_OUTOF10: 0-NO PAIN

## 2025-05-21 NOTE — PROGRESS NOTES
Chief Complaint  Preoperative evaluation    HPI:  Mr. Franky Sauceda is a 74-year-old male with a history of HTN, atypical atrial flutter, paroxysmal atrial fibrillation, CAD, and TIA who presented to our structural heart clinic on 4/21/25 with symptomatic severe aortic stenosis. He reported a 6 month history of progressively worsening fatigue, SOB, and HERNDON. He was under consideration for TAVR pending cardiac cath. LHC 5/9/25 demonstrated multivessel CAD. Following discussion at our multidisciplinary structural heart meeting, the decision was made to proceed with SAVR, CABG, Maze procedure, and LAAC. He presents now for preoperative evaluation. He denies chest pain, but has been experiencing intermittent jaw pain, most recently yesterday. He continues to experiences dyspnea at rest and on exertion.     Medical History[1]    Surgical History[2]    Family History[3]    Social History     Socioeconomic History    Marital status:      Spouse name: Not on file    Number of children: Not on file    Years of education: Not on file    Highest education level: Not on file   Occupational History    Not on file   Tobacco Use    Smoking status: Never     Passive exposure: Never    Smokeless tobacco: Never   Vaping Use    Vaping status: Never Used   Substance and Sexual Activity    Alcohol use: Not Currently    Drug use: Never    Sexual activity: Yes   Other Topics Concern    Not on file   Social History Narrative    Not on file     Social Drivers of Health     Financial Resource Strain: Low Risk  (8/2/2024)    Overall Financial Resource Strain (CARDIA)     Difficulty of Paying Living Expenses: Not hard at all   Food Insecurity: Not on file   Transportation Needs: No Transportation Needs (8/2/2024)    PRAPARE - Transportation     Lack of Transportation (Medical): No     Lack of Transportation (Non-Medical): No   Physical Activity: Not on file   Stress: Not on file   Social Connections: Not on file   Intimate Partner  Violence: Not on file   Housing Stability: Low Risk  (8/2/2024)    Housing Stability Vital Sign     Unable to Pay for Housing in the Last Year: No     Number of Times Moved in the Last Year: 0     Homeless in the Last Year: No       RX Allergies[4]    Encounter Medications[5]    Physical Exam  Constitutional:       General: He is not in acute distress.     Appearance: Normal appearance. He is not ill-appearing.   HENT:      Head: Normocephalic and atraumatic.   Cardiovascular:      Rate and Rhythm: Normal rate and regular rhythm.   Pulmonary:      Effort: Pulmonary effort is normal. No respiratory distress.   Musculoskeletal:      Right lower leg: Edema present.      Left lower leg: Edema present.   Skin:     General: Skin is warm and dry.   Neurological:      General: No focal deficit present.      Mental Status: He is alert and oriented to person, place, and time.   Psychiatric:         Mood and Affect: Mood normal.         Behavior: Behavior normal.       No results found for this or any previous visit (from the past 4464 hours).    Lab Results   Component Value Date    WBC 7.8 04/16/2025    HGB 14.3 04/16/2025    HCT 42.0 04/16/2025    MCV 91.7 04/16/2025     04/16/2025     Lab Results   Component Value Date    GLUCOSE 112 (H) 04/16/2025    CALCIUM 9.5 04/16/2025     04/16/2025    K 4.9 04/16/2025    CO2 24 04/16/2025     04/16/2025    BUN 25 04/16/2025    CREATININE 1.39 (H) 04/16/2025     Transthoracic echo (TTE) complete  Result Date: 4/15/2025        [facility] [place], [place] [address]        Tel [phone] and Fax [phone] TRANSTHORACIC ECHOCARDIOGRAM REPORT  Patient Name:       ALISHA You Physician:    Talib Soni MD Study Date:         4/15/2025           Ordering Provider:    Talib VEGA                                                                TYRESE MRN/PID:            [id number]            Fellow: Accession#:         [id number]        Nurse: Date of Birth/Age:  1950 / 74      Sonographer:          Kiera faye RDCS Gender assigned at  M                   Additional Staff: Birth: Height:             175.26 cm           Admit Date:           4/15/2025 Weight:             103.42 kg           Admission Status:     Outpatient BSA / BMI:          2.18 m2 / 33.67     Encounter#:           [id number]                     kg/m2 Blood Pressure:     /                   Department Location:  [facility] Study Type:    TRANSTHORACIC ECHO (TTE) COMPLETE Diagnosis/ICD: Nonrheumatic aortic (valve) stenosis-I35.0 Indication:    AS CPT Code:      Echo Complete w Full Doppler-[id number]  Study Detail: The following Echo studies were performed: 2D, M-Mode, Doppler and               color flow. A bubble study was not performed.  PHYSICIAN INTERPRETATION: Left Ventricle: The left ventricular systolic function is hyperdynamic, with a Vasquez's biplane calculated ejection fraction of 76%. There are no regional left ventricular wall motion abnormalities. The left ventricular cavity size is normal. There is normal septal and normal posterior left ventricular wall thickness. Left ventricular diastolic filling was not assessed. Left Atrium: The left atrium is mildly dilated. A bubble study using agitated saline was not performed. Right Ventricle: The right ventricle is normal in size. There is normal right ventricular global systolic function. Right Atrium: The right atrium is normal in size. Aortic Valve: The aortic valve is trileaflet. There is severe aortic valve cusp calcification. There is severe aortic valve thickening. There is reduced systolic aortic valve leaflet excursion. There is evidence of severe aortic valve stenosis. The aortic valve  dimensionless index is 0.30. There is mild aortic valve regurgitation. The peak instantaneous gradient of the aortic valve is 90 mmHg. The mean gradient of the aortic valve is 55 mmHg. Mitral Valve: The mitral valve is mildly thickened. There is mild mitral annular calcification. There is mild mitral valve regurgitation. Tricuspid Valve: The tricuspid valve is structurally normal. There is mild tricuspid regurgitation. Pulmonic Valve: The pulmonic valve is structurally normal. There is physiologic pulmonic valve regurgitation. Pericardium: There is no pericardial effusion noted. Aorta: The aortic root is normal. Pulmonary Artery: The tricuspid regurgitant velocity is 3.17 m/s, and with an estimated right atrial pressure of 3 mmHg, the estimated pulmonary artery pressure is mild to moderately elevated with the RVSP at 43.2 mmHg. In comparison to the previous echocardiogram(s): Compared with study dated 4/2/2024, AS has progressed. RVSP has increased.  CONCLUSIONS:  1. The left ventricular systolic function is hyperdynamic, with a Vasquez's biplane calculated ejection fraction of 76%.  2. The left atrium is mildly dilated.  3. Severe aortic valve stenosis.  4. There is severe aortic valve cusp calcification.  5. There is severe aortic valve thickening.  6. Mild aortic valve regurgitation.  7. Mild to moderately elevated pulmonary artery pressure. QUANTITATIVE DATA SUMMARY:  2D MEASUREMENTS:          Normal Ranges: LAs:             4.11 cm  (2.7-4.0cm) RVIDd:           1.98 cm  (0.9-3.6cm) IVSd:            1.03 cm  (0.6-1.1cm) LVPWd:           0.98 cm  (0.6-1.1cm) LVIDd:           5.36 cm  (3.9-5.9cm) LVIDs:           2.91 cm LV Mass Index:   94 g/m2 LVEDV Index:     42 ml/m2 LV % FS          45.7 %  LEFT ATRIUM:                  Normal Ranges: LA Vol A4C:        70.9 ml    (22+/-6mL/m2) LA Vol A2C:        89.2 ml LA Vol BP:         81.4 ml LA Vol Index A4C:  32.5ml/m2 LA Vol Index A2C:  40.8 ml/m2 LA Vol Index BP:    37.3 ml/m2 LA Area A4C:       23.1 cm2 LA Area A2C:       25.3 cm2 LA Major Axis A4C: 6.4 cm LA Major Axis A2C: 6.1 cm LA Vol A4C:        65.8 ml LA Vol A2C:        84.2 ml LA Vol Index BSA:  34.3 ml/m2  RIGHT ATRIUM:                 Normal Ranges: RA Vol A4C:        29.2 ml    (8.3-19.5ml) RA Vol Index A4C:  13.4 ml/m2 RA Area A4C:       13.5 cm2 RA Major Axis A4C: 5.3 cm  LV SYSTOLIC FUNCTION:                      Normal Ranges: EF-A4C View:    79 % (>=55%) EF-A2C View:    70 % EF-Biplane:     76 % LV EF Reported: 76 %  LV DIASTOLIC FUNCTION:           Normal Ranges: MV e'                  0.103 m/s (>8.0) MV lateral e'          0.11 m/s MV medial e'           0.10 m/s  AORTIC VALVE:                      Normal Ranges: AoV Vmax:                4.74 m/s  (<=1.7m/s) AoV Peak P.7 mmHg (<20mmHg) AoV Mean P.1 mmHg (1.7-11.5mmHg) LVOT Max Yon:            1.44 m/s  (<=1.1m/s) AoV VTI:                 120.70 cm (18-25cm) LVOT VTI:                35.64 cm LVOT Diameter:           2.00 cm   (1.8-2.4cm) AoV Area, VTI:           0.93 cm2  (2.5-5.5cm2) AoV Area,Vmax:           0.95 cm2  (2.5-4.5cm2) AoV Dimensionless Index: 0.30  RIGHT VENTRICLE: RV Basal 2.90 cm RV Major 7.7 cm TAPSE:   24.0 mm RV s'    0.15 m/s  TRICUSPID VALVE/RVSP:          Normal Ranges: Peak TR Velocity:     3.17 m/s Est. RA Pressure:     3 mmHg RV Syst Pressure:     43 mmHg  (< 30mmHg)  PULMONIC VALVE:          Normal Ranges: PV Accel Time:  103 msec (>120ms) PV Max Yon:     1.0 m/s  (0.6-0.9m/s) PV Max P.1 mmHg  AORTA: Asc Ao Diam 3.37 cm  [id number] Loy Soni MD Electronically signed on 4/15/2025 at 1:24:21 PM  ** Final **        Assessment and Plan:   Mr. Franky Sauceda is a 74-year-old male who presents with symptomatic severe aortic stenosis, multivessel CAD, paroxysmal Afib, and atypical Aflutter    - Plan for bioprosthetic aortic valve replacement, CABG x3 with LIMA and SVG, Maze procedure, and LAAC.  Informed consent obtained. Case request placed for 5/30/25.   - Plan to obtain dental clearance in anticipation of aortic valve replacement   - Carotid duplex US and vein mapping ordered   - Prescription for sublingual nitroglycerin provided due to concern that his jaw pain may be an angina equivalent  - The patient was advised to contact our office with questions or concerns prior to surgery     Zaida Molina MD  Cardiac Surgeon  Division of Cardiac Surgery  [facility]       [1]   Past Medical History:  Diagnosis Date    Bence Huang proteinuria 07/27/2013    Bence Huang proteinuria    Coronary artery disease involving native coronary artery of native heart without angina pectoris 04/02/2024    CAC 2101 (2017)    Dorsalgia, unspecified 09/27/2016    Back pain, acute    Encounter for immunization 06/29/2018    Need for shingles vaccine    Fatigue 04/10/2023    Hyperlipidemia     Hypertension     Localized edema 07/07/2022    Edema of extremities    Moderate aortic stenosis 04/02/2024    PAF (paroxysmal atrial fibrillation) (Multi) 04/02/2024    Pain in left knee 12/08/2021    Acute pain of left knee    Pain in unspecified knee 04/28/2020    Joint pain, knee    Personal history of other diseases of the circulatory system     Personal history of coronary atherosclerosis    Personal history of other diseases of urinary system 07/07/2022    History of renal insufficiency syndrome    Personal history of other infectious and parasitic diseases 05/14/2018    History of herpes labialis    Severe aortic stenosis 04/02/2024    Sprain of other specified parts of left knee, initial encounter 01/02/2020    Injury of meniscus of left knee, initial encounter    Unilateral primary osteoarthritis, left knee 08/20/2019    Arthritis of knee, left   [2]   Past Surgical History:  Procedure Laterality Date    CARDIAC CATHETERIZATION N/A 5/9/2025    Procedure: LHC, With LV;  Surgeon: Loy VEGA  MD Nae;  Location: [facility];  Service: Cardiovascular;  Laterality: N/A;    CHOLECYSTECTOMY  01/28/2014    Cholecystectomy    COLONOSCOPY  01/28/2014    Complete Colonoscopy    ESOPHAGOGASTRODUODENOSCOPY  01/28/2014    Diagnostic Esophagogastroduodenoscopy   [3]   Family History  Problem Relation Name Age of Onset    Hypertension Mother      Other (abdominal aortic aneurysm) Father      Hyperlipidemia Father      Hypertension Father     [4]   Allergies  Allergen Reactions    Cerivastatin Other     Back/neck stiffness    Jardiance [Empagliflozin] Unknown    Mounjaro [Tirzepatide] GI Upset    Nifedipine Other     Urinary urgency    Statins-Hmg-Coa Reductase Inhibitors Other     Joint pain/stiffness    Icosapent Ethyl Palpitations   [5]   Outpatient Encounter Medications as of 5/21/2025   Medication Sig Dispense Refill    aspirin 81 mg EC tablet Take 1 tablet (81 mg) by mouth once daily. Do not fill before May 10, 2025. 90 tablet 3    atorvastatin (Lipitor) 80 mg tablet Take 1 tablet (80 mg) by mouth once daily. 90 tablet 3    Blood glucose monitoring meter kit kit 1 each if needed.      blood sugar diagnostic (True Metrix Glucose Test Strip) strip 100 strips once daily. 100 strip 3    cholecalciferol (Vitamin D-3) 50 mcg (2,000 unit) capsule Take 1 capsule (50 mcg) by mouth once daily.      dilTIAZem ER (Tiazac) 300 mg 24 hr capsule Take 1 capsule (300 mg) by mouth once daily. 90 capsule 3    Eliquis 5 mg tablet Take 1 tablet (5 mg) by mouth 2 times a day. 180 tablet 3    FreeStyle Jaun 3 [place] misc Use as instructed 1 each 0    FreeStyle Jaun 3 Sensor device Change sensor every 14 days as directed. Rotate sites. 2 each 3    lancets misc 100 Lancets once daily. 100 each 3    losartan (Cozaar) 100 mg tablet Take 1 tablet (100 mg) by mouth once daily. 90 tablet 3    metFORMIN (Glucophage) 500 mg tablet Take 2 tablets (1,000 mg) by mouth 2 times a day. 360 tablet 3    metoprolol succinate XL (Toprol-XL)  25 mg 24 hr tablet Take 1 tablet (25 mg) by mouth once daily. 90 tablet 3    pantoprazole (ProtoNix) 40 mg EC tablet Take 1 tablet (40 mg) by mouth once daily. 90 tablet 3    semaglutide 0.25 mg or 0.5 mg (2 mg/3 mL) pen injector Inject 0.5 mg under the skin 1 (one) time per week. 3 mL 1     No facility-administered encounter medications on file as of 5/21/2025.

## 2025-05-21 NOTE — H&P (VIEW-ONLY)
Chief Complaint  Preoperative evaluation    HPI:  Mr. Franky Sauceda is a 74-year-old male with a history of HTN, atypical atrial flutter, paroxysmal atrial fibrillation, CAD, and TIA who presented to our structural heart clinic on 4/21/25 with symptomatic severe aortic stenosis. He reported a 6 month history of progressively worsening fatigue, SOB, and HERNDON. He was under consideration for TAVR pending cardiac cath. LHC 5/9/25 demonstrated multivessel CAD. Following discussion at our multidisciplinary structural heart meeting, the decision was made to proceed with SAVR, CABG, Maze procedure, and LAAC. He presents now for preoperative evaluation. He denies chest pain, but has been experiencing intermittent jaw pain, most recently yesterday. He continues to experiences dyspnea at rest and on exertion.     Medical History[1]    Surgical History[2]    Family History[3]    Social History     Socioeconomic History    Marital status:      Spouse name: Not on file    Number of children: Not on file    Years of education: Not on file    Highest education level: Not on file   Occupational History    Not on file   Tobacco Use    Smoking status: Never     Passive exposure: Never    Smokeless tobacco: Never   Vaping Use    Vaping status: Never Used   Substance and Sexual Activity    Alcohol use: Not Currently    Drug use: Never    Sexual activity: Yes   Other Topics Concern    Not on file   Social History Narrative    Not on file     Social Drivers of Health     Financial Resource Strain: Low Risk  (8/2/2024)    Overall Financial Resource Strain (CARDIA)     Difficulty of Paying Living Expenses: Not hard at all   Food Insecurity: Not on file   Transportation Needs: No Transportation Needs (8/2/2024)    PRAPARE - Transportation     Lack of Transportation (Medical): No     Lack of Transportation (Non-Medical): No   Physical Activity: Not on file   Stress: Not on file   Social Connections: Not on file   Intimate Partner  Pt comes to the ER with c/o lower back and bilateral hip pain after slipping on ice at work and landing on his L hip. States the pain has worsened throughout the day. Currently an 8/10, has tried his medical marijuana which has not helped ease his pain. States the pain is worse with coughing and movement.     Triage Assessment (Adult)       Row Name 01/05/24 1955          Triage Assessment    Airway WDL WDL        Respiratory WDL    Respiratory WDL WDL        Skin Circulation/Temperature WDL    Skin Circulation/Temperature WDL WDL        Cardiac WDL    Cardiac WDL WDL        Peripheral/Neurovascular WDL    Peripheral Neurovascular WDL WDL        Cognitive/Neuro/Behavioral WDL    Cognitive/Neuro/Behavioral WDL WDL                      Violence: Not on file   Housing Stability: Low Risk  (8/2/2024)    Housing Stability Vital Sign     Unable to Pay for Housing in the Last Year: No     Number of Times Moved in the Last Year: 0     Homeless in the Last Year: No       RX Allergies[4]    Encounter Medications[5]    Physical Exam  Constitutional:       General: He is not in acute distress.     Appearance: Normal appearance. He is not ill-appearing.   HENT:      Head: Normocephalic and atraumatic.   Cardiovascular:      Rate and Rhythm: Normal rate and regular rhythm.   Pulmonary:      Effort: Pulmonary effort is normal. No respiratory distress.   Musculoskeletal:      Right lower leg: Edema present.      Left lower leg: Edema present.   Skin:     General: Skin is warm and dry.   Neurological:      General: No focal deficit present.      Mental Status: He is alert and oriented to person, place, and time.   Psychiatric:         Mood and Affect: Mood normal.         Behavior: Behavior normal.       No results found for this or any previous visit (from the past 4464 hours).    Lab Results   Component Value Date    WBC 7.8 04/16/2025    HGB 14.3 04/16/2025    HCT 42.0 04/16/2025    MCV 91.7 04/16/2025     04/16/2025     Lab Results   Component Value Date    GLUCOSE 112 (H) 04/16/2025    CALCIUM 9.5 04/16/2025     04/16/2025    K 4.9 04/16/2025    CO2 24 04/16/2025     04/16/2025    BUN 25 04/16/2025    CREATININE 1.39 (H) 04/16/2025     Transthoracic echo (TTE) complete  Result Date: 4/15/2025        [facility] [place], [place] [address]        Tel [phone] and Fax [phone] TRANSTHORACIC ECHOCARDIOGRAM REPORT  Patient Name:       ALISHA You Physician:    Talib Soni MD Study Date:         4/15/2025           Ordering Provider:    Talib VEGA                                                                TYRESE MRN/PID:            [id number]            Fellow: Accession#:         [id number]        Nurse: Date of Birth/Age:  1950 / 74      Sonographer:          Kiera faye RDCS Gender assigned at  M                   Additional Staff: Birth: Height:             175.26 cm           Admit Date:           4/15/2025 Weight:             103.42 kg           Admission Status:     Outpatient BSA / BMI:          2.18 m2 / 33.67     Encounter#:           [id number]                     kg/m2 Blood Pressure:     /                   Department Location:  [facility] Study Type:    TRANSTHORACIC ECHO (TTE) COMPLETE Diagnosis/ICD: Nonrheumatic aortic (valve) stenosis-I35.0 Indication:    AS CPT Code:      Echo Complete w Full Doppler-45080  Study Detail: The following Echo studies were performed: 2D, M-Mode, Doppler and               color flow. A bubble study was not performed.  PHYSICIAN INTERPRETATION: Left Ventricle: The left ventricular systolic function is hyperdynamic, with a Vasquez's biplane calculated ejection fraction of 76%. There are no regional left ventricular wall motion abnormalities. The left ventricular cavity size is normal. There is normal septal and normal posterior left ventricular wall thickness. Left ventricular diastolic filling was not assessed. Left Atrium: The left atrium is mildly dilated. A bubble study using agitated saline was not performed. Right Ventricle: The right ventricle is normal in size. There is normal right ventricular global systolic function. Right Atrium: The right atrium is normal in size. Aortic Valve: The aortic valve is trileaflet. There is severe aortic valve cusp calcification. There is severe aortic valve thickening. There is reduced systolic aortic valve leaflet excursion. There is evidence of severe aortic valve stenosis. The aortic valve  dimensionless index is 0.30. There is mild aortic valve regurgitation. The peak instantaneous gradient of the aortic valve is 90 mmHg. The mean gradient of the aortic valve is 55 mmHg. Mitral Valve: The mitral valve is mildly thickened. There is mild mitral annular calcification. There is mild mitral valve regurgitation. Tricuspid Valve: The tricuspid valve is structurally normal. There is mild tricuspid regurgitation. Pulmonic Valve: The pulmonic valve is structurally normal. There is physiologic pulmonic valve regurgitation. Pericardium: There is no pericardial effusion noted. Aorta: The aortic root is normal. Pulmonary Artery: The tricuspid regurgitant velocity is 3.17 m/s, and with an estimated right atrial pressure of 3 mmHg, the estimated pulmonary artery pressure is mild to moderately elevated with the RVSP at 43.2 mmHg. In comparison to the previous echocardiogram(s): Compared with study dated 4/2/2024, AS has progressed. RVSP has increased.  CONCLUSIONS:  1. The left ventricular systolic function is hyperdynamic, with a Vasquez's biplane calculated ejection fraction of 76%.  2. The left atrium is mildly dilated.  3. Severe aortic valve stenosis.  4. There is severe aortic valve cusp calcification.  5. There is severe aortic valve thickening.  6. Mild aortic valve regurgitation.  7. Mild to moderately elevated pulmonary artery pressure. QUANTITATIVE DATA SUMMARY:  2D MEASUREMENTS:          Normal Ranges: LAs:             4.11 cm  (2.7-4.0cm) RVIDd:           1.98 cm  (0.9-3.6cm) IVSd:            1.03 cm  (0.6-1.1cm) LVPWd:           0.98 cm  (0.6-1.1cm) LVIDd:           5.36 cm  (3.9-5.9cm) LVIDs:           2.91 cm LV Mass Index:   94 g/m2 LVEDV Index:     42 ml/m2 LV % FS          45.7 %  LEFT ATRIUM:                  Normal Ranges: LA Vol A4C:        70.9 ml    (22+/-6mL/m2) LA Vol A2C:        89.2 ml LA Vol BP:         81.4 ml LA Vol Index A4C:  32.5ml/m2 LA Vol Index A2C:  40.8 ml/m2 LA Vol Index BP:    37.3 ml/m2 LA Area A4C:       23.1 cm2 LA Area A2C:       25.3 cm2 LA Major Axis A4C: 6.4 cm LA Major Axis A2C: 6.1 cm LA Vol A4C:        65.8 ml LA Vol A2C:        84.2 ml LA Vol Index BSA:  34.3 ml/m2  RIGHT ATRIUM:                 Normal Ranges: RA Vol A4C:        29.2 ml    (8.3-19.5ml) RA Vol Index A4C:  13.4 ml/m2 RA Area A4C:       13.5 cm2 RA Major Axis A4C: 5.3 cm  LV SYSTOLIC FUNCTION:                      Normal Ranges: EF-A4C View:    79 % (>=55%) EF-A2C View:    70 % EF-Biplane:     76 % LV EF Reported: 76 %  LV DIASTOLIC FUNCTION:           Normal Ranges: MV e'                  0.103 m/s (>8.0) MV lateral e'          0.11 m/s MV medial e'           0.10 m/s  AORTIC VALVE:                      Normal Ranges: AoV Vmax:                4.74 m/s  (<=1.7m/s) AoV Peak P.7 mmHg (<20mmHg) AoV Mean P.1 mmHg (1.7-11.5mmHg) LVOT Max Yon:            1.44 m/s  (<=1.1m/s) AoV VTI:                 120.70 cm (18-25cm) LVOT VTI:                35.64 cm LVOT Diameter:           2.00 cm   (1.8-2.4cm) AoV Area, VTI:           0.93 cm2  (2.5-5.5cm2) AoV Area,Vmax:           0.95 cm2  (2.5-4.5cm2) AoV Dimensionless Index: 0.30  RIGHT VENTRICLE: RV Basal 2.90 cm RV Major 7.7 cm TAPSE:   24.0 mm RV s'    0.15 m/s  TRICUSPID VALVE/RVSP:          Normal Ranges: Peak TR Velocity:     3.17 m/s Est. RA Pressure:     3 mmHg RV Syst Pressure:     43 mmHg  (< 30mmHg)  PULMONIC VALVE:          Normal Ranges: PV Accel Time:  103 msec (>120ms) PV Max Oyn:     1.0 m/s  (0.6-0.9m/s) PV Max P.1 mmHg  AORTA: Asc Ao Diam 3.37 cm  [id number] Loy Soni MD Electronically signed on 4/15/2025 at 1:24:21 PM  ** Final **        Assessment and Plan:   Mr. Franky Sauceda is a 74-year-old male who presents with symptomatic severe aortic stenosis, multivessel CAD, paroxysmal Afib, and atypical Aflutter    - Plan for bioprosthetic aortic valve replacement, CABG x3 with LIMA and SVG, Maze procedure, and LAAC.  Informed consent obtained. Case request placed for 5/30/25.   - Plan to obtain dental clearance in anticipation of aortic valve replacement   - Carotid duplex US and vein mapping ordered   - Prescription for sublingual nitroglycerin provided due to concern that his jaw pain may be an angina equivalent  - The patient was advised to contact our office with questions or concerns prior to surgery     Zaida Molina MD  Cardiac Surgeon  Division of Cardiac Surgery  [facility] [place]       [1]   Past Medical History:  Diagnosis Date    Bence Huang proteinuria 07/27/2013    Bence Huang proteinuria    Coronary artery disease involving native coronary artery of native heart without angina pectoris 04/02/2024    CAC 2101 (2017)    Dorsalgia, unspecified 09/27/2016    Back pain, acute    Encounter for immunization 06/29/2018    Need for shingles vaccine    Fatigue 04/10/2023    Hyperlipidemia     Hypertension     Localized edema 07/07/2022    Edema of extremities    Moderate aortic stenosis 04/02/2024    PAF (paroxysmal atrial fibrillation) (Multi) 04/02/2024    Pain in left knee 12/08/2021    Acute pain of left knee    Pain in unspecified knee 04/28/2020    Joint pain, knee    Personal history of other diseases of the circulatory system     Personal history of coronary atherosclerosis    Personal history of other diseases of urinary system 07/07/2022    History of renal insufficiency syndrome    Personal history of other infectious and parasitic diseases 05/14/2018    History of herpes labialis    Severe aortic stenosis 04/02/2024    Sprain of other specified parts of left knee, initial encounter 01/02/2020    Injury of meniscus of left knee, initial encounter    Unilateral primary osteoarthritis, left knee 08/20/2019    Arthritis of knee, left   [2]   Past Surgical History:  Procedure Laterality Date    CARDIAC CATHETERIZATION N/A 5/9/2025    Procedure: LHC, With LV;  Surgeon: Loy VEGA  MD Nae;  Location: [facility];  Service: Cardiovascular;  Laterality: N/A;    CHOLECYSTECTOMY  01/28/2014    Cholecystectomy    COLONOSCOPY  01/28/2014    Complete Colonoscopy    ESOPHAGOGASTRODUODENOSCOPY  01/28/2014    Diagnostic Esophagogastroduodenoscopy   [3]   Family History  Problem Relation Name Age of Onset    Hypertension Mother      Other (abdominal aortic aneurysm) Father      Hyperlipidemia Father      Hypertension Father     [4]   Allergies  Allergen Reactions    Cerivastatin Other     Back/neck stiffness    Jardiance [Empagliflozin] Unknown    Mounjaro [Tirzepatide] GI Upset    Nifedipine Other     Urinary urgency    Statins-Hmg-Coa Reductase Inhibitors Other     Joint pain/stiffness    Icosapent Ethyl Palpitations   [5]   Outpatient Encounter Medications as of 5/21/2025   Medication Sig Dispense Refill    aspirin 81 mg EC tablet Take 1 tablet (81 mg) by mouth once daily. Do not fill before May 10, 2025. 90 tablet 3    atorvastatin (Lipitor) 80 mg tablet Take 1 tablet (80 mg) by mouth once daily. 90 tablet 3    Blood glucose monitoring meter kit kit 1 each if needed.      blood sugar diagnostic (True Metrix Glucose Test Strip) strip 100 strips once daily. 100 strip 3    cholecalciferol (Vitamin D-3) 50 mcg (2,000 unit) capsule Take 1 capsule (50 mcg) by mouth once daily.      dilTIAZem ER (Tiazac) 300 mg 24 hr capsule Take 1 capsule (300 mg) by mouth once daily. 90 capsule 3    Eliquis 5 mg tablet Take 1 tablet (5 mg) by mouth 2 times a day. 180 tablet 3    FreeStyle Jaun 3 [place] misc Use as instructed 1 each 0    FreeStyle Jaun 3 Sensor device Change sensor every 14 days as directed. Rotate sites. 2 each 3    lancets misc 100 Lancets once daily. 100 each 3    losartan (Cozaar) 100 mg tablet Take 1 tablet (100 mg) by mouth once daily. 90 tablet 3    metFORMIN (Glucophage) 500 mg tablet Take 2 tablets (1,000 mg) by mouth 2 times a day. 360 tablet 3    metoprolol succinate XL (Toprol-XL)  25 mg 24 hr tablet Take 1 tablet (25 mg) by mouth once daily. 90 tablet 3    pantoprazole (ProtoNix) 40 mg EC tablet Take 1 tablet (40 mg) by mouth once daily. 90 tablet 3    semaglutide 0.25 mg or 0.5 mg (2 mg/3 mL) pen injector Inject 0.5 mg under the skin 1 (one) time per week. 3 mL 1     No facility-administered encounter medications on file as of 5/21/2025.

## 2025-05-22 ENCOUNTER — HOSPITAL ENCOUNTER (OUTPATIENT)
Dept: VASCULAR MEDICINE | Facility: CLINIC | Age: 75
Discharge: HOME | End: 2025-05-22
Payer: COMMERCIAL

## 2025-05-22 DIAGNOSIS — I25.10 CORONARY ARTERY DISEASE INVOLVING NATIVE CORONARY ARTERY OF NATIVE HEART WITHOUT ANGINA PECTORIS: ICD-10-CM

## 2025-05-22 DIAGNOSIS — Z01.810 ENCOUNTER FOR PREPROCEDURAL CARDIOVASCULAR EXAMINATION: ICD-10-CM

## 2025-05-22 DIAGNOSIS — I65.23 BILATERAL CAROTID ARTERY STENOSIS: ICD-10-CM

## 2025-05-22 PROCEDURE — 93970 EXTREMITY STUDY: CPT

## 2025-05-22 PROCEDURE — 93880 EXTRACRANIAL BILAT STUDY: CPT

## 2025-05-22 PROCEDURE — 93880 EXTRACRANIAL BILAT STUDY: CPT | Performed by: INTERNAL MEDICINE

## 2025-05-22 PROCEDURE — 93970 EXTREMITY STUDY: CPT | Performed by: INTERNAL MEDICINE

## 2025-05-23 ENCOUNTER — HOSPITAL ENCOUNTER (OUTPATIENT)
Dept: RADIOLOGY | Facility: HOSPITAL | Age: 75
Discharge: HOME | End: 2025-05-23
Payer: COMMERCIAL

## 2025-05-23 ENCOUNTER — APPOINTMENT (OUTPATIENT)
Dept: VASCULAR MEDICINE | Facility: CLINIC | Age: 75
End: 2025-05-23
Payer: COMMERCIAL

## 2025-05-23 ENCOUNTER — PRE-ADMISSION TESTING (OUTPATIENT)
Dept: PREADMISSION TESTING | Facility: HOSPITAL | Age: 75
End: 2025-05-23
Payer: COMMERCIAL

## 2025-05-23 ENCOUNTER — PREP FOR PROCEDURE (OUTPATIENT)
Dept: CRITICAL CARE MEDICINE | Facility: HOSPITAL | Age: 75
End: 2025-05-23

## 2025-05-23 VITALS
OXYGEN SATURATION: 98 % | HEART RATE: 48 BPM | RESPIRATION RATE: 18 BRPM | SYSTOLIC BLOOD PRESSURE: 165 MMHG | BODY MASS INDEX: 34.8 KG/M2 | TEMPERATURE: 98.3 F | WEIGHT: 235 LBS | HEIGHT: 69 IN | DIASTOLIC BLOOD PRESSURE: 76 MMHG

## 2025-05-23 DIAGNOSIS — I35.0 NONRHEUMATIC AORTIC VALVE STENOSIS: Primary | ICD-10-CM

## 2025-05-23 DIAGNOSIS — E11.9 TYPE 2 DIABETES MELLITUS WITHOUT COMPLICATION, WITHOUT LONG-TERM CURRENT USE OF INSULIN: ICD-10-CM

## 2025-05-23 DIAGNOSIS — I25.10 CORONARY ARTERY DISEASE INVOLVING NATIVE CORONARY ARTERY OF NATIVE HEART WITHOUT ANGINA PECTORIS: ICD-10-CM

## 2025-05-23 DIAGNOSIS — G47.33 OBSTRUCTIVE SLEEP APNEA SYNDROME: ICD-10-CM

## 2025-05-23 DIAGNOSIS — I35.0 SEVERE AORTIC STENOSIS: ICD-10-CM

## 2025-05-23 LAB
ABO GROUP (TYPE) IN BLOOD: NORMAL
ALBUMIN SERPL BCP-MCNC: 4.1 G/DL (ref 3.4–5)
ALP SERPL-CCNC: 92 U/L (ref 33–136)
ALT SERPL W P-5'-P-CCNC: 15 U/L (ref 10–52)
ANION GAP SERPL CALC-SCNC: 12 MMOL/L (ref 10–20)
ANTIBODY SCREEN: NORMAL
APPEARANCE UR: CLEAR
APTT PPP: 32 SECONDS (ref 26–36)
AST SERPL W P-5'-P-CCNC: 13 U/L (ref 9–39)
BASOPHILS # BLD AUTO: 0.02 X10*3/UL (ref 0–0.1)
BASOPHILS NFR BLD AUTO: 0.3 %
BILIRUB SERPL-MCNC: 0.8 MG/DL (ref 0–1.2)
BILIRUB UR STRIP.AUTO-MCNC: NEGATIVE MG/DL
BUN SERPL-MCNC: 22 MG/DL (ref 6–23)
CALCIUM SERPL-MCNC: 9.5 MG/DL (ref 8.6–10.6)
CHLORIDE SERPL-SCNC: 106 MMOL/L (ref 98–107)
CO2 SERPL-SCNC: 26 MMOL/L (ref 21–32)
COLOR UR: ABNORMAL
CREAT SERPL-MCNC: 1.35 MG/DL (ref 0.5–1.3)
EGFRCR SERPLBLD CKD-EPI 2021: 55 ML/MIN/1.73M*2
EOSINOPHIL # BLD AUTO: 0.12 X10*3/UL (ref 0–0.4)
EOSINOPHIL NFR BLD AUTO: 1.7 %
ERYTHROCYTE [DISTWIDTH] IN BLOOD BY AUTOMATED COUNT: 13.6 % (ref 11.5–14.5)
EST. AVERAGE GLUCOSE BLD GHB EST-MCNC: 154 MG/DL
GLUCOSE SERPL-MCNC: 108 MG/DL (ref 74–99)
GLUCOSE UR STRIP.AUTO-MCNC: NORMAL MG/DL
HBA1C MFR BLD: 7 % (ref ?–5.7)
HCT VFR BLD AUTO: 40.5 % (ref 41–52)
HGB BLD-MCNC: 13.3 G/DL (ref 13.5–17.5)
IMM GRANULOCYTES # BLD AUTO: 0.02 X10*3/UL (ref 0–0.5)
IMM GRANULOCYTES NFR BLD AUTO: 0.3 % (ref 0–0.9)
INR PPP: 1.2 (ref 0.9–1.1)
KETONES UR STRIP.AUTO-MCNC: NEGATIVE MG/DL
LEUKOCYTE ESTERASE UR QL STRIP.AUTO: NEGATIVE
LYMPHOCYTES # BLD AUTO: 2.67 X10*3/UL (ref 0.8–3)
LYMPHOCYTES NFR BLD AUTO: 38.8 %
MCH RBC QN AUTO: 30.6 PG (ref 26–34)
MCHC RBC AUTO-ENTMCNC: 32.8 G/DL (ref 32–36)
MCV RBC AUTO: 93 FL (ref 80–100)
MONOCYTES # BLD AUTO: 0.75 X10*3/UL (ref 0.05–0.8)
MONOCYTES NFR BLD AUTO: 10.9 %
MUCOUS THREADS #/AREA URNS AUTO: NORMAL /LPF
NEUTROPHILS # BLD AUTO: 3.3 X10*3/UL (ref 1.6–5.5)
NEUTROPHILS NFR BLD AUTO: 48 %
NITRITE UR QL STRIP.AUTO: NEGATIVE
NRBC BLD-RTO: 0 /100 WBCS (ref 0–0)
PH UR STRIP.AUTO: 6 [PH]
PLATELET # BLD AUTO: 228 X10*3/UL (ref 150–450)
POTASSIUM SERPL-SCNC: 4.3 MMOL/L (ref 3.5–5.3)
PROT SERPL-MCNC: 6.6 G/DL (ref 6.4–8.2)
PROT UR STRIP.AUTO-MCNC: ABNORMAL MG/DL
PROTHROMBIN TIME: 12.8 SECONDS (ref 9.8–12.4)
RBC # BLD AUTO: 4.34 X10*6/UL (ref 4.5–5.9)
RBC # UR STRIP.AUTO: NEGATIVE MG/DL
RBC #/AREA URNS AUTO: NORMAL /HPF
RH FACTOR (ANTIGEN D): NORMAL
SODIUM SERPL-SCNC: 140 MMOL/L (ref 136–145)
SP GR UR STRIP.AUTO: 1.02
SQUAMOUS #/AREA URNS AUTO: NORMAL /HPF
UROBILINOGEN UR STRIP.AUTO-MCNC: NORMAL MG/DL
WBC # BLD AUTO: 6.9 X10*3/UL (ref 4.4–11.3)
WBC #/AREA URNS AUTO: NORMAL /HPF

## 2025-05-23 PROCEDURE — 85610 PROTHROMBIN TIME: CPT | Performed by: STUDENT IN AN ORGANIZED HEALTH CARE EDUCATION/TRAINING PROGRAM

## 2025-05-23 PROCEDURE — 85730 THROMBOPLASTIN TIME PARTIAL: CPT | Performed by: STUDENT IN AN ORGANIZED HEALTH CARE EDUCATION/TRAINING PROGRAM

## 2025-05-23 PROCEDURE — 86850 RBC ANTIBODY SCREEN: CPT | Performed by: STUDENT IN AN ORGANIZED HEALTH CARE EDUCATION/TRAINING PROGRAM

## 2025-05-23 PROCEDURE — 99205 OFFICE O/P NEW HI 60 MIN: CPT | Performed by: NURSE PRACTITIONER

## 2025-05-23 PROCEDURE — 86901 BLOOD TYPING SEROLOGIC RH(D): CPT | Performed by: STUDENT IN AN ORGANIZED HEALTH CARE EDUCATION/TRAINING PROGRAM

## 2025-05-23 PROCEDURE — 83036 HEMOGLOBIN GLYCOSYLATED A1C: CPT

## 2025-05-23 PROCEDURE — 36415 COLL VENOUS BLD VENIPUNCTURE: CPT | Performed by: STUDENT IN AN ORGANIZED HEALTH CARE EDUCATION/TRAINING PROGRAM

## 2025-05-23 PROCEDURE — 86923 COMPATIBILITY TEST ELECTRIC: CPT

## 2025-05-23 PROCEDURE — 80053 COMPREHEN METABOLIC PANEL: CPT | Performed by: STUDENT IN AN ORGANIZED HEALTH CARE EDUCATION/TRAINING PROGRAM

## 2025-05-23 PROCEDURE — 85025 COMPLETE CBC W/AUTO DIFF WBC: CPT | Performed by: STUDENT IN AN ORGANIZED HEALTH CARE EDUCATION/TRAINING PROGRAM

## 2025-05-23 PROCEDURE — 87081 CULTURE SCREEN ONLY: CPT | Performed by: STUDENT IN AN ORGANIZED HEALTH CARE EDUCATION/TRAINING PROGRAM

## 2025-05-23 PROCEDURE — 81001 URINALYSIS AUTO W/SCOPE: CPT | Performed by: STUDENT IN AN ORGANIZED HEALTH CARE EDUCATION/TRAINING PROGRAM

## 2025-05-23 PROCEDURE — 71046 X-RAY EXAM CHEST 2 VIEWS: CPT

## 2025-05-23 PROCEDURE — 99215 OFFICE O/P EST HI 40 MIN: CPT | Performed by: NURSE PRACTITIONER

## 2025-05-23 RX ORDER — CHLORHEXIDINE GLUCONATE ORAL RINSE 1.2 MG/ML
15 SOLUTION DENTAL AS NEEDED
Qty: 473 ML | Refills: 0 | Status: ON HOLD | OUTPATIENT
Start: 2025-05-23 | End: 2025-05-25

## 2025-05-23 RX ORDER — CHLORHEXIDINE GLUCONATE 40 MG/ML
SOLUTION TOPICAL DAILY PRN
Qty: 473 ML | Refills: 0 | Status: ON HOLD | OUTPATIENT
Start: 2025-05-23 | End: 2025-05-28

## 2025-05-23 ASSESSMENT — ENCOUNTER SYMPTOMS
MUSCULOSKELETAL NEGATIVE: 1
NECK NEGATIVE: 1
EYES NEGATIVE: 1
GASTROINTESTINAL NEGATIVE: 1
DYSPNEA WITH EXERTION: 1
NEUROLOGICAL NEGATIVE: 1
CONSTITUTIONAL NEGATIVE: 1
ENDOCRINE NEGATIVE: 1

## 2025-05-23 ASSESSMENT — DUKE ACTIVITY SCORE INDEX (DASI)
TOTAL_SCORE: 50.7
CAN YOU TAKE CARE OF YOURSELF (EAT, DRESS, BATHE, OR USE TOILET): YES
DASI METS SCORE: 9
CAN YOU HAVE SEXUAL RELATIONS: YES
CAN YOU WALK A BLOCK OR TWO ON LEVEL GROUND: YES
CAN YOU PARTICIPATE IN STRENOUS SPORTS LIKE SWIMMING, SINGLES TENNIS, FOOTBALL, BASKETBALL, OR SKIING: NO
CAN YOU PARTICIPATE IN MODERATE RECREATIONAL ACTIVITIES LIKE GOLF, BOWLING, DANCING, DOUBLES TENNIS OR THROWING A BASEBALL OR FOOTBALL: YES
CAN YOU DO MODERATE WORK AROUND THE HOUSE LIKE VACUUMING, SWEEPING FLOORS OR CARRYING GROCERIES: YES
CAN YOU WALK INDOORS, SUCH AS AROUND YOUR HOUSE: YES
CAN YOU DO HEAVY WORK AROUND THE HOUSE LIKE SCRUBBING FLOORS OR LIFTING AND MOVING HEAVY FURNITURE: YES
CAN YOU CLIMB A FLIGHT OF STAIRS OR WALK UP A HILL: YES
CAN YOU RUN A SHORT DISTANCE: YES
CAN YOU DO LIGHT WORK AROUND THE HOUSE LIKE DUSTING OR WASHING DISHES: YES
CAN YOU DO YARD WORK LIKE RAKING LEAVES, WEEDING OR PUSHING A MOWER: YES

## 2025-05-23 ASSESSMENT — LIFESTYLE VARIABLES: SMOKING_STATUS: NONSMOKER

## 2025-05-23 NOTE — CPM/PAT H&P
[facility]/PAT Evaluation       Name: Franky Sauceda (Franky Sauceda)  /Age: 1950/74 y.o.     Visit Type:   In-Person       Chief Complaint: Severe aortic stenosis  Coronary artery disease involving native coronary artery of native heart without angina pectoris    HPI  Franky Sauceda is a 74 y.o. male referred to [facility] by Dr. Zaida Molina  for perioperative evaluation in advance of CABG x 3 w/ LIMA/vein, AVR, MAZE, BRENT clip scheduled on 25.  Patient has a past medical history significant for: HTN, atypical atrial flutter, paroxysmal atrial fibrillation, CAD, and TIA  symptomatic severe aortic stenosis.     Medical History[1]    Surgical History[2]    Patient  reports being sexually active.    Family History[3]    Allergies[4]    Prior to Admission medications    Medication Sig Start Date End Date Taking? Authorizing Provider   aspirin 81 mg EC tablet Take 1 tablet (81 mg) by mouth once daily. Do not fill before May 10, 2025. 5/10/25   CARLOS Raman   atorvastatin (Lipitor) 80 mg tablet Take 1 tablet (80 mg) by mouth once daily. 25   CARLOS Cerna   Blood glucose monitoring meter kit kit 1 each if needed.    Historical Provider, MD   blood sugar diagnostic (True Metrix Glucose Test Strip) strip 100 strips once daily. 24   CARLOS Cerna   cholecalciferol (Vitamin D-3) 50 mcg (2,000 unit) capsule Take 1 capsule (50 mcg) by mouth once daily. 12/15/14   Historical Provider, MD   dilTIAZem ER (Tiazac) 300 mg 24 hr capsule Take 1 capsule (300 mg) by mouth once daily. 25   CARLOS Cerna   Eliquis 5 mg tablet Take 1 tablet (5 mg) by mouth 2 times a day. 25   CARLOS Cerna   FreeStyle Jaun 3 [place] misc Use as instructed 25   CARLOS Cerna   FreeStyle Jaun 3 Sensor device Change sensor every 14 days as directed. Rotate sites. 25   CARLOS Cerna   lancets misc 100 Lancets once daily. 8/15/24   Kay MELENDEZ  Hickey, APRN-CNP   losartan (Cozaar) 100 mg tablet Take 1 tablet (100 mg) by mouth once daily. 4/9/25   Kay NORA CARLOS Lucas   metFORMIN (Glucophage) 500 mg tablet Take 2 tablets (1,000 mg) by mouth 2 times a day. 10/16/24   CARLOS Cerna   metoprolol succinate XL (Toprol-XL) 25 mg 24 hr tablet Take 1 tablet (25 mg) by mouth once daily. 4/9/25   CARLOS Cerna   nitroglycerin (Nitrostat) 0.4 mg SL tablet Place 1 tablet (0.4 mg) under the tongue every 5 minutes if needed for chest pain. 5/21/25 5/21/26  Zaida Molina MD   pantoprazole (ProtoNix) 40 mg EC tablet Take 1 tablet (40 mg) by mouth once daily. 4/9/25   CARLOS Cerna   semaglutide 0.25 mg or 0.5 mg (2 mg/3 mL) pen injector Inject 0.5 mg under the skin 1 (one) time per week. 5/13/25   CARLOS Cerna        PAT ROS:   Constitutional:   neg    Neuro/Psych:   neg    Eyes:   neg    Ears:   neg    Nose:   neg    Mouth:   neg    Throat:   neg    Neck:   neg    Cardio:    HERNDON  Respiratory:   Endocrine:   neg    GI:   neg    :   neg    Musculoskeletal:   neg    Hematologic:   neg    Skin:  neg        Physical Exam  Vitals reviewed.   Constitutional:       Appearance: Normal appearance.   HENT:      Head: Normocephalic and atraumatic.      Nose: Nose normal.   Cardiovascular:      Rate and Rhythm: Normal rate.      Pulses: Normal pulses.   Pulmonary:      Effort: Pulmonary effort is normal.      Breath sounds: Normal breath sounds.   Abdominal:      Palpations: Abdomen is soft.   Musculoskeletal:         General: Normal range of motion.      Cervical back: Normal range of motion.   Skin:     General: Skin is warm.   Neurological:      General: No focal deficit present.      Mental Status: He is alert and oriented to person, place, and time.      Gait: Gait abnormal.   Psychiatric:         Mood and Affect: Mood normal.         Behavior: Behavior normal.          PAT AIRWAY:   Airway:     Mallampati::  II    TM distance::   >3 FB    Neck ROM::  Full  normal        Testing/Diagnostic:     Patient Specialist/PCP:     Visit Vitals  Smoking Status Never       DASI Risk Score      Flowsheet Row Questionnaire Series Submission from 5/22/2025 in [facility] with Generic Provider Amina   Can you take care of yourself (eat, dress, bathe, or use toilet)?  2.75  filed at 05/22/2025 1038   Can you walk indoors, such as around your house? 1.75  filed at 05/22/2025 1038   Can you walk a block or two on level ground?  2.75  filed at 05/22/2025 1038   Can you climb a flight of stairs or walk up a hill? 5.5  filed at 05/22/2025 1038   Can you run a short distance? 8  filed at 05/22/2025 1038   Can you do light work around the house like dusting or washing dishes? 2.7  filed at 05/22/2025 1038   Can you do moderate work around the house like vacuuming, sweeping floors or carrying groceries? 3.5  filed at 05/22/2025 1038   Can you do heavy work around the house like scrubbing floors or lifting and moving heavy furniture?  8  filed at 05/22/2025 1038   Can you do yard work like raking leaves, weeding or pushing a mower? 4.5  filed at 05/22/2025 1038   Can you have sexual relations? 5.25  filed at 05/22/2025 1038   Can you participate in moderate recreational activities like golf, bowling, dancing, doubles tennis or throwing a baseball or football? 6  filed at 05/22/2025 1038   Can you participate in strenous sports like swimming, singles tennis, football, basketball, or skiing? 0  filed at 05/22/2025 1038   DASI SCORE 50.7  filed at 05/22/2025 1038   METS Score (Will be calculated only when all the questions are answered) 9  filed at 05/22/2025 1038          Brendani DVT Assessment      Flowsheet Row Admission (Discharged) from 5/9/2025 in [facility] with Loy Soni MD ED to Hosp-Admission (Discharged) from 8/2/2024 in [facility] Bldg A 1 with Alba Petit MD and Jose Antonio Page MD   DVT Score  (IF A SCORE IS NOT CALCULATING, MUST SELECT A BMI TO COMPLETE) 6 filed at 05/09/2025 0740 6 filed at 08/02/2024 2226   Surgical Factors Minor surgery planned filed at 05/09/2025 0740 --   BMI (BMI MUST BE CHOSEN) 31-40 (Obesity) filed at 05/09/2025 0740 31-40 (Obesity) filed at 08/02/2024 2226   RETIRED: History -- Prior major surgery filed at 08/02/2024 2226   RETIRED: Age -- 60-75 years filed at 08/02/2024 2226          Modified Frailty Index    No data to display       [id number] Stroke Risk Points  Current as of just now        7 0 to 9 Points:      Last Change:           The [id number] risk score (Renny DODGE, et al. 2009. © 2010 [employer]) quantifies the risk of stroke for a patient with atrial fibrillation. For patients without atrial fibrillation or under the age of 18 this score appears as N/A. Higher score values generally indicate higher risk of stroke.          Points Metrics   0 Has Congestive Heart Failure:  No     Patients with congestive heart failure get 1 point.    Current as of just now   1 Has Hypertension:  Yes     Patients with hypertension get 1 point.    Current as of just now   2 Age:  74     Patients 65 to 74 years old get 1 point, or patients 75 years and older get 2 points.    Current as of just now   1 Has Diabetes:  Yes     Patients with diabetes get 1 point.    Current as of just now   2 Had Stroke:  No  Had TIA:  Yes  Had Thromboembolism:  No     Patients who have had a stroke, TIA, or thromboembolism get 2 points.    Current as of just now   1 Has Vascular Disease:  Yes     Patients with vascular disease get 1 point.    Current as of just now   0 Clinically Relevant Sex:  Male     Patients with a clinically relevant sex of Female get 1 point.    Current as of just now             Revised Cardiac Risk Index    No data to display       Apfel Simplified Score    No data to display       Risk Analysis Index Results This Encounter    No data found in the last 10  encounters.       Stop Bang Score      Flowsheet Row Questionnaire Series Submission from 5/22/2025 in [facility] with Generic Provider Amina   Do you snore loudly? 0  filed at 05/22/2025 1038   Do you often feel tired or fatigued after your sleep? 1  filed at 05/22/2025 1038   Has anyone ever observed you stop breathing in your sleep? 0  filed at 05/22/2025 1038   Do you have or are you being treated for high blood pressure? 1  filed at 05/22/2025 1038   Recent BMI (Calculated) 34  filed at 05/22/2025 1038   Is BMI greater than 35 kg/m2? 0=No  filed at 05/22/2025 1038   Age older than 50 years old? 1=Yes  filed at 05/22/2025 1038   Gender - Male 1=Yes  filed at 05/22/2025 1038          Prodigy: High Risk  Total Score: 20              Prodigy Age Score      Prodigy Gender Score          ARISCAT Score for Postoperative Pulmonary Complications    No data to display       [place] Perioperative Risk for Myocardial Infarction or Cardiac Arrest (HATTIE)    No data to display         Assessment and Plan:     Anesthesia  The patient denies problems with anesthesia in the past such as PONV, prolonged sedation, awareness, dental damage, aspiration, cardiac arrest, difficult intubation, or unexpected hospital admissions.     Neurology  The patient has no neurological diagnoses or significant findings on chart review, clinical presentation, and evaluation. No grossly apparent neurological perioperative risk. The patient is at increased risk for postoperative delirium secondary to age 65 or older, polypharmacy. The patient is at increased risk for perioperative stroke secondary to a-fib, cardiac disease, hypertension , increased age, hyperlipidemia, diabetes mellitus, general anesthesia, operative time >2.5 hours, cardiac or emergency surgery. Handouts for preoperative brain exercises given to patient.    HEENT/Airway  No documented or reported history of airway difficulty.     Cardiovascular  The  patient is scheduled for cardiac surgery.  Preoperative evaluation is complete pending results of lab work    EKG  The patient has no EKG or echocardiographic changes concerning for myocardial ischemia.     Heart Failure  The patient has no known history of heart failure.  Additionally, the patient reports no symptoms of heart failure and demonstrates no signs of heart failure.    Hypertension Evaluation  The patient has a known history of hypertension that is controlled.  Patient's hypertension is most consistent with stage 1.    Heart Rhythm Evaluation  Patient has a history of afib which is paroxysmal and is treated by [facility]    Heart Valve Evaluation  The patient has a known history of valvular heart disease.  Per patient's prior studies, the patient has severe aortic stenosis    Cardiology Evaluation  The patient follows with cardiology, Dr. Loy Soni. Patient was last seen 4-15-25. Per note,   The patient has severe aortic stenosis and remains asymptomatic.  Weight lifting restrictions reviewed. High liklihood of AVR next 1 year.  He wishes to proceed with AVR and given the very high gradients that is reasonable.  Refer to structural valve team.   Pulmonary  The patient has NICKI. The patient is at increased risk of perioperative pulmonary complications secondary to thoracic surgery, NICKI, advanced age greater than 60, morbid obesity.    RCRI  The patient meets 2 RCRI criteria and therefore has a 10.1% risk (elevated) of major adverse cardiac complications.    METS  The patient's functional capacity is greater than 4 METS.    HATTIE score which indicates a 0.2% risk of intraoperative or 30-day postoperative MACE (major adverse cardiac event).    The patient has a stop bang score of 6, which places patient at high risk for having NICKI.    ARISCAT 50, High, 42.1% risk of in-hospital postoperative pulmonary complications    PRODIGY 25, high risk of respiratory depression episode. Patient given PI sheet for preoperative  deep breathing exercises.    Caprini score 11, high risk of perioperative VTE.     Eat 10- 0,  self-perceived oropharyngeal dysphagia scale (0-40)     Hematology  No diagnoses or significant findings on chart review or clinical presentation and evaluation.    Antiplatelet management   The patient is currently receiving antiplatelet therapy for CAD.    Anticoagulation management  The patient is currently receiving anticoagulation therapy for afib.    Patient instructed to ambulate as soon as possible postoperatively to decrease thromboembolic risk. Initiate mechanical DVT prophylaxis as soon as possible and initiate chemical prophylaxis when deemed safe from a bleeding standpoint post surgery.     Transfusion Evaluation  A type and screen was obtained given the likelihood for perioperative transfusion of blood or blood products.    Gastrointestinal  The patient has GERD    Genitourinary  No diagnoses or significant findings on chart review or clinical presentation and evaluation.    Renal  No renal diagnoses or significant findings on chart review or clinical presentation and evaluation. The patient has specific risk factors associated with increased risk of perioperative renal complications related to age greater than 55, male gender, hypertension, diabetes mellitus. DPS 2-3 medium risk for perioperative acute kidney injury.    Musculoskeletal  No diagnoses or significant findings on chart review or clinical presentation and evaluation.    Endocrine  Diabetes Evaluation  The patient has no history of diabetes mellitus.    Thyroid Disease Evaluation  The patient has no history of thyroid disease.    ID  No diagnoses or significant findings on chart review or clinical presentation and evaluation. MRSA screening obtained. Prescriptions and instructions given for Hibiclens and Peridex.    -Preoperative medication instructions were provided and reviewed with the patient.  Any additional testing or evaluation was  explained to the patient.  NPO Instructions were discussed, and the patient's questions were answered prior to conclusion of this encounter. Patient verbalized understanding of preoperative instructions. After Visit Summary given.               [1]   Past Medical History:  Diagnosis Date    Bence Huang proteinuria 07/27/2013    Bence Huang proteinuria    Coronary artery disease involving native coronary artery of native heart without angina pectoris 04/02/2024    CAC 2101 (2017)    Dorsalgia, unspecified 09/27/2016    Back pain, acute    Encounter for immunization 06/29/2018    Need for shingles vaccine    Fatigue 04/10/2023    Hyperlipidemia     Hypertension     Localized edema 07/07/2022    Edema of extremities    Moderate aortic stenosis 04/02/2024    PAF (paroxysmal atrial fibrillation) (Multi) 04/02/2024    Pain in left knee 12/08/2021    Acute pain of left knee    Pain in unspecified knee 04/28/2020    Joint pain, knee    Personal history of other diseases of the circulatory system     Personal history of coronary atherosclerosis    Personal history of other diseases of urinary system 07/07/2022    History of renal insufficiency syndrome    Personal history of other infectious and parasitic diseases 05/14/2018    History of herpes labialis    Severe aortic stenosis 04/02/2024    Sprain of other specified parts of left knee, initial encounter 01/02/2020    Injury of meniscus of left knee, initial encounter    Unilateral primary osteoarthritis, left knee 08/20/2019    Arthritis of knee, left   [2]   Past Surgical History:  Procedure Laterality Date    CARDIAC CATHETERIZATION N/A 5/9/2025    Procedure: LHC, With LV;  Surgeon: Loy Soni MD;  Location: [facility] Cardiac Cath Lab;  Service: Cardiovascular;  Laterality: N/A;    CHOLECYSTECTOMY  01/28/2014    Cholecystectomy    COLONOSCOPY  01/28/2014    Complete Colonoscopy    ESOPHAGOGASTRODUODENOSCOPY  01/28/2014    Diagnostic Esophagogastroduodenoscopy   [3]    Family History  Problem Relation Name Age of Onset    Hypertension Mother      Other (abdominal aortic aneurysm) Father      Hyperlipidemia Father      Hypertension Father     [4]   Allergies  Allergen Reactions    Cerivastatin Other     Back/neck stiffness    Jardiance [Empagliflozin] Unknown    Mounjaro [Tirzepatide] GI Upset    Nifedipine Other     Urinary urgency    Statins-Hmg-Coa Reductase Inhibitors Other     Joint pain/stiffness    Icosapent Ethyl Palpitations      History:  Diagnosis Date    Bence Huang proteinuria 07/27/2013    Bence Huang proteinuria    Coronary artery disease involving native coronary artery of native heart without angina pectoris 04/02/2024    CAC 2101 (2017)    Dorsalgia, unspecified 09/27/2016    Back pain, acute    Encounter for immunization 06/29/2018    Need for shingles vaccine    Fatigue 04/10/2023    Hyperlipidemia     Hypertension     Localized edema 07/07/2022    Edema of extremities    Moderate aortic stenosis 04/02/2024    PAF (paroxysmal atrial fibrillation) (Multi) 04/02/2024    Pain in left knee 12/08/2021    Acute pain of left knee    Pain in unspecified knee 04/28/2020    Joint pain, knee    Personal history of other diseases of the circulatory system     Personal history of coronary atherosclerosis    Personal history of other diseases of urinary system 07/07/2022    History of renal insufficiency syndrome    Personal history of other infectious and parasitic diseases 05/14/2018    History of herpes labialis    Severe aortic stenosis 04/02/2024    Sprain of other specified parts of left knee, initial encounter 01/02/2020    Injury of meniscus of left knee, initial encounter    Unilateral primary osteoarthritis, left knee 08/20/2019    Arthritis of knee, left   [2]   Past Surgical History:  Procedure Laterality Date    CARDIAC CATHETERIZATION N/A 5/9/2025    Procedure: LHC, With LV;  Surgeon: Loy Soni MD;  Location: [facility] Cardiac Cath Lab;  Service: Cardiovascular;  Laterality: N/A;    CHOLECYSTECTOMY  01/28/2014    Cholecystectomy    COLONOSCOPY  01/28/2014    Complete Colonoscopy    ESOPHAGOGASTRODUODENOSCOPY  01/28/2014    Diagnostic Esophagogastroduodenoscopy   [3]   Family History  Problem Relation Name Age of Onset    Hypertension Mother      Other (abdominal aortic aneurysm) Father      Hyperlipidemia Father      Hypertension Father     [4]   Allergies  Allergen Reactions    Cerivastatin Other     Back/neck stiffness     Jardiance [Empagliflozin] Unknown    Mounjaro [Tirzepatide] GI Upset    Nifedipine Other     Urinary urgency    Statins-Hmg-Coa Reductase Inhibitors Other     Joint pain/stiffness    Icosapent Ethyl Palpitations

## 2025-05-23 NOTE — PREPROCEDURE INSTRUCTIONS
Fasting Guidelines    Why must I stop eating and drinking near surgery time?  With sedation, food or liquid in your stomach can enter your lungs causing serious complications  Increases nausea and vomiting    When do I need to stop eating and drinking before my surgery?  Do not eat any food or drink any liquids after midnight the night before your surgery/procedure.  You may have sips of water to take medications.    Additional Instructions:     We have sent a prescription for Hibiclens soap and Peridex mouth wash to your preferred pharmacy.  If you have not already, Please  your prescription and start using as directed before surgery.  Follow the instruction sheet provided to you at your CPM/PAT appointment.    Avoid herbal supplements, multivitamins and NSAIDS (non-steroidal anti-inflammatory drugs) such as Advil, Aleve, Ibuprofen, Naproxen, Excedrin, Meloxicam or Celebrex for at least 7 days prior to surgery. May take Tylenol as needed.    Avoid tobacco and alcohol products for 24 hours prior to surgery.    CONTACT SURGEON'S OFFICE IF YOU DEVELOP:  * Fever = 100.4 F   * New respiratory symptoms (e.g. cough, shortness of breath, respiratory distress, sore throat)  * Recent loss of taste or smell  *Flu like symptoms such as headache, fatigue or gastrointestinal symptoms  * You develop any open sores, shingles, burning or painful urination   AND/OR:  * You no longer wish to have the surgery.  * Any other personal circumstances change that may lead to the need to cancel or defer this surgery.  *You were admitted to any hospital within one week of your planned procedure.    Seven/Six Days before Surgery:  Review your medication instructions, stop indicated medications    Day of Surgery:  Review your medication instructions, take indicated medications  Wear comfortable loose fitting clothing  Do not use moisturizers, creams, lotions or perfume  All jewelry and valuables should be left at home      [facility]  [phone]           Patient Information: Pre-Operative Infection Prevention Measures     Why did I have my nose, under my arms, and groin swabbed?  The purpose of the swab is to identify Staphylococcus aureus inside your nose or on your skin.  The swab was sent to the laboratory for culture.  A positive swab/culture for Staphylococcus aureus is called colonization or carriage.      What is Staphylococcus aureus?  Staphylococcus aureus, also known as “staph”, is a germ found on the skin or in the nose of healthy people.  Sometimes Staphylococcus aureus can get into the body and cause an infection.  This can be minor (such as pimples, boils, or other skin problems).  It might also be serious (such as a blood infection, pneumonia, or a surgical site infection).    What is Staphylococcus aureus colonization or carriage?  Colonization or carriage means that a person has the germ but is not sick from it.  These bacteria can be spread on the hands or when breathing or sneezing.    How is Staphylococcus aureus spread?  It is most often spread by close contact with a person or item that carries it.    What happens if my culture is positive for Staphylococcus aureus?  Your doctor/medical team will use this information to guide any antibiotic treatment which may be necessary.  Regardless of the culture results, we will clean the inside of your nose with a betadine swab just before you have your surgery.      Will I get an infection if I have Staphylococcus aureus in my nose or on my skin?  Anyone can get an infection with Staphylococcus aureus.  However, the best way to reduce your risk of infection is to follow the instructions provided to you for the use of your CHG soap and dental rinse.        Patient Information: Oral/Dental Rinse    What is oral/dental rinse?   It is a mouthwash. It is a way of cleaning the mouth with a germ-killing solution before your surgery.  The solution contains  chlorhexidine, commonly known as CHG.   It is used inside the mouth to kill a bacteria known as Staphylococcus aureus.  Let your doctor know if you are allergic to Chlorhexidine.    Why do I need to use CHG oral/dental rinse?  The CHG oral/dental rinse helps to kill a bacteria in your mouth known as Staphylococcus aureus.     This reduces the risk of infection at the surgical site.      Using your CHG oral/dental rinse  STEPS:  Use your CHG oral/dental rinse after you brush your teeth the night before (at bedtime) and the morning of your surgery.  Follow all directions on your prescription label.    Use the cap on the container to measure 15ml   Swish (gargle if you can) the mouthwash in your mouth for at least 30 seconds, (do not swallow) and spit out  After you use your CHG rinse, do not rinse your mouth with water, drink or eat.  Please refer to the prescription label for the appropriate time to resume oral intake      What side effects might I have using the CHG oral/dental rinse?  CHG rinse will stick to plaque on the teeth.  Brush and floss just before use.  Teeth brushing will help avoid staining of plaque during use.      Patient Information: Home Preoperative Antibacterial Shower      What is a home preoperative antibacterial shower?  This shower is a way of cleaning the skin with a germ-killing solution before surgery.  The solution contains chlorhexidine, commonly known as CHG.  CHG is a skin cleanser with germ-killing ability.  Let your doctor know if you are allergic to chlorhexidine.    Why do I need to take a preoperative antibacterial shower?  Skin is not sterile.  It is best to try to make your skin as free of germs as possible before surgery.  Proper cleansing with a germ-killing soap before surgery can lower the number of germs on your skin.  This helps to reduce the risk of infection at the surgical site.  Following the instructions listed below will help you prepare your skin for surgery.       How do I use the solution?  Steps:  Begin using your CHG soap 5 days before your scheduled surgery on ________________________.    First, wash and rinse your hair using the CHG soap. Keep CHG soap away from ear canals and eyes.  Rinse completely, do not condition.  Hair extensions should be removed.  Wash your face with your normal soap and rinse.    Apply the CHG solution to a clean wet washcloth.  Turn the water off or move away from the water spray to avoid premature rinsing of the CHG soap as you are applying.   Firmly lather your entire body from the neck down.  Do not use on your face.  Pay special attention to the area(s) where your incision(s) will be located unless they are on your face.  Avoid scrubbing your skin too hard.  The important point is to have the CHG soap sit on your skin for 3 minutes.    When the 3 minutes are up, turn on the water and rinse the CHG solution off your body completely.   DO NOT wash with regular soap after you have used the CHG soap solution  Pat yourself dry with a clean, freshly-laundered towel.  DO NOT apply powders, deodorants, or lotions.  Dress in clean, freshly laundered nightclothes.    Be sure to sleep with clean, freshly laundered sheets.  Be aware that CHG will cause stains on fabrics; if you wash them with bleach after use.  Rinse your washcloth and other linens that have contact with CHG completely.  Use only non-chlorine detergents to launder the items used.   The morning of surgery is the fifth day.  Repeat the above steps and dress in clean comfortable clothing     Whom should I contact if I have any questions regarding the use of CHG soap?  Call the [facility], [facility] at [phone] if you have any questions.               Preoperative Brain Exercises    What are brain exercises?  A brain exercise is any activity that engages your thinking (cognitive) skills.    What types of activities are  considered brain exercises?  Jigsaw puzzles, crossword puzzles, word jumble, memory games, word search, and many more.  Many can be found free online or on your phone via a mobile bill.    Why should I do brain exercises before my surgery?  More recent research has shown brain exercise before surgery can lower the risk of postoperative delirium (confusion) which can be especially important for older adults.  Patients who did brain exercises for 5 to 10 hours the days before surgery, cut their risk of postoperative delirium in half up to 1 week after surgery.         [facility]    Preoperative Deep Breathing Exercises    Why it is important to do deep breathing exercises before my surgery?  Deep breathing exercises strengthen your breathing muscles.  This helps you to recover after your surgery and decreases the chance of breathing complications.      How are the deep breathing exercises done?  Sit straight with your back supported.  Breathe in deeply and slowly through your nose. Your lower rib cage should expand and your abdomen may move forward.  Hold that breath for 3 to 5 seconds.  Breathe out through pursed lips, slowly and completely.  Rest and repeat 10 times every hour while awake.  Rest longer if you become dizzy or lightheaded.         Patient and Family Education             Ways You Can Help Prevent Blood Clots             This handout explains some simple things you can do to help prevent blood clots.      Blood clots are blockages that can form in the body's veins. When a blood clot forms in your deep veins, it may be called a deep vein thrombosis, or DVT for short. Blood clots can happen in any part of the body where blood flows, but they are most common in the arms and legs. If a piece of a blood clot breaks free and travels to the lungs, it is called a pulmonary embolus (PE). A PE can be a very serious problem.         Being in the hospital or having surgery can raise your  chances of getting a blood clot because you may not be well enough to move around as much as you normally do.         Ways you can help prevent blood clots in the hospital         Wearing SCDs. SCDs stands for Sequential Compression Devices.   SCDs are special sleeves that wrap around your legs  They attach to a pump that fills them with air to gently squeeze your legs every few minutes.   This helps return the blood in your legs to your heart.   SCDs should only be taken off when walking or bathing.   SCDs may not be comfortable, but they can help save your life.               Wearing compression stockings - if your doctor orders them. These special snug fitting stockings gently squeeze your legs to help blood flow.       Walking. Walking helps move the blood in your legs.   If your doctor says it is ok, try walking the halls at least   5 times a day. Ask us to help you get up, so you don't fall.      Taking any blood thinning medicines your doctor orders.        Page 1 of 2     [facility]; 3/23   Ways you can help prevent blood clots at home       Wearing compression stockings - if your doctor orders them. ? Walking - to help move the blood in your legs.       Taking any blood thinning medicines your doctor orders.      Signs of a blood clot or PE      Tell your doctor or nurse know right away if you have of the problems listed below.    If you are at home, seek medical care right away. Call 911 for chest pain or problems breathing.               Signs of a blood clot (DVT) - such as pain,  swelling, redness or warmth in your arm or leg      Signs of a pulmonary embolism (PE) - such as chest     pain or feeling short of breath

## 2025-05-24 LAB
HOLD SPECIMEN: NORMAL
STAPHYLOCOCCUS SPEC CULT: NORMAL

## 2025-05-26 DIAGNOSIS — E11.9 TYPE 2 DIABETES MELLITUS WITHOUT COMPLICATION, WITHOUT LONG-TERM CURRENT USE OF INSULIN: ICD-10-CM

## 2025-05-26 DIAGNOSIS — I25.10 ATHEROSCLEROTIC CARDIOVASCULAR DISEASE: ICD-10-CM

## 2025-05-29 ENCOUNTER — APPOINTMENT (OUTPATIENT)
Dept: VASCULAR MEDICINE | Facility: CLINIC | Age: 75
End: 2025-05-29
Payer: COMMERCIAL

## 2025-05-29 ENCOUNTER — ANESTHESIA EVENT (OUTPATIENT)
Dept: OPERATING ROOM | Facility: HOSPITAL | Age: 75
End: 2025-05-29
Payer: COMMERCIAL

## 2025-05-29 RX ORDER — SEMAGLUTIDE 0.68 MG/ML
INJECTION, SOLUTION SUBCUTANEOUS
Qty: 3 ML | Refills: 2 | Status: ON HOLD | OUTPATIENT
Start: 2025-05-29

## 2025-05-29 NOTE — PROGRESS NOTES
Pharmacy Medication History Review    Franky Sauceda is a 74 y.o. male who is planned to be admitted for No Principal Problem: There is no principal problem currently on the Problem List. Please update the Problem List and refresh.. Pharmacy called the patient prior to their scheduled procedure and reviewed the patient's [id number] medications for accuracy.    Medications ADDED:  none  Medications CHANGED:  Aspirin 81mg directions form #1QD to #1BID - temporary dose for procedure  Medications REMOVED:   none    Please review updated prior to admission medication list and comments regarding how patient may be taking medications differently by going to Admission tab --> Admission Orders --> Admit Orders / Review prior to admission medications.     Preferred pharmacy, last doses of medications, and allergies to be confirmed with patient by nursing the day of procedure.     Sources used to complete the med history include:  [facility]  Pharmacy dispense history  Patient Interview Good historian  Chart Review  Care Everywhere     Below are additional concerns with the patient's PTA list.  Patient states they are taking #1 tablet of aspirin 81mg twice daily. States this is a temporary dose in preparation of procedure since eliquis 5mg is on hold  Patient states eliquis 5mg, losartan 100mg, metformin 500mg, and ozempic are on hold in preparation of procedure    Zaida Villatoro [facility]  Please reach out via Secure Chat for questions

## 2025-05-30 ENCOUNTER — APPOINTMENT (OUTPATIENT)
Dept: CARDIOLOGY | Facility: HOSPITAL | Age: 75
DRG: 219 | End: 2025-05-30
Payer: COMMERCIAL

## 2025-05-30 ENCOUNTER — APPOINTMENT (OUTPATIENT)
Dept: RADIOLOGY | Facility: HOSPITAL | Age: 75
DRG: 219 | End: 2025-05-30
Payer: COMMERCIAL

## 2025-05-30 ENCOUNTER — HOSPITAL ENCOUNTER (OUTPATIENT)
Dept: OPERATING ROOM | Facility: HOSPITAL | Age: 75
Discharge: HOME | End: 2025-05-30

## 2025-05-30 ENCOUNTER — HOSPITAL ENCOUNTER (INPATIENT)
Facility: HOSPITAL | Age: 75
DRG: 219 | End: 2025-05-30
Attending: STUDENT IN AN ORGANIZED HEALTH CARE EDUCATION/TRAINING PROGRAM | Admitting: STUDENT IN AN ORGANIZED HEALTH CARE EDUCATION/TRAINING PROGRAM
Payer: COMMERCIAL

## 2025-05-30 ENCOUNTER — ANESTHESIA (OUTPATIENT)
Dept: OPERATING ROOM | Facility: HOSPITAL | Age: 75
End: 2025-05-30
Payer: COMMERCIAL

## 2025-05-30 DIAGNOSIS — Z95.1 S/P CABG (CORONARY ARTERY BYPASS GRAFT): ICD-10-CM

## 2025-05-30 DIAGNOSIS — I25.10 CORONARY ARTERY DISEASE INVOLVING NATIVE CORONARY ARTERY OF NATIVE HEART WITHOUT ANGINA PECTORIS: ICD-10-CM

## 2025-05-30 DIAGNOSIS — I35.2 NONRHEUMATIC AORTIC (VALVE) STENOSIS WITH INSUFFICIENCY: ICD-10-CM

## 2025-05-30 DIAGNOSIS — I35.0 SEVERE AORTIC STENOSIS: Primary | ICD-10-CM

## 2025-05-30 DIAGNOSIS — I25.118 ATHEROSCLEROTIC HEART DISEASE OF NATIVE CORONARY ARTERY WITH OTHER FORMS OF ANGINA PECTORIS: ICD-10-CM

## 2025-05-30 DIAGNOSIS — Z95.2 S/P AVR: ICD-10-CM

## 2025-05-30 PROBLEM — G89.29 CHRONIC LOW BACK PAIN: Status: ACTIVE | Noted: 2025-05-30

## 2025-05-30 PROBLEM — G47.33 OSA (OBSTRUCTIVE SLEEP APNEA): Status: ACTIVE | Noted: 2025-05-30

## 2025-05-30 PROBLEM — M51.9 LUMBAR DISC DISEASE: Status: ACTIVE | Noted: 2025-05-30

## 2025-05-30 PROBLEM — M54.50 CHRONIC LOW BACK PAIN: Status: ACTIVE | Noted: 2025-05-30

## 2025-05-30 PROBLEM — Z79.01 ANTICOAGULANT LONG-TERM USE: Status: ACTIVE | Noted: 2025-05-30

## 2025-05-30 PROBLEM — I27.20 PULMONARY HYPERTENSION (MULTI): Status: ACTIVE | Noted: 2025-05-30

## 2025-05-30 PROBLEM — E66.811 CLASS 1 OBESITY DUE TO EXCESS CALORIES WITH SERIOUS COMORBIDITY AND BODY MASS INDEX (BMI) OF 34.0 TO 34.9 IN ADULT: Status: ACTIVE | Noted: 2025-05-30

## 2025-05-30 PROBLEM — M19.90 OSTEOARTHRITIS: Status: ACTIVE | Noted: 2025-05-30

## 2025-05-30 PROBLEM — R07.9 CHEST PAIN: Status: ACTIVE | Noted: 2025-05-30

## 2025-05-30 PROBLEM — N18.9 CHRONIC RENAL INSUFFICIENCY: Status: ACTIVE | Noted: 2025-05-30

## 2025-05-30 PROBLEM — N18.31 CHRONIC RENAL IMPAIRMENT, STAGE 3A (MULTI): Status: ACTIVE | Noted: 2025-05-30

## 2025-05-30 PROBLEM — E66.09 CLASS 1 OBESITY DUE TO EXCESS CALORIES WITH SERIOUS COMORBIDITY AND BODY MASS INDEX (BMI) OF 34.0 TO 34.9 IN ADULT: Status: ACTIVE | Noted: 2025-05-30

## 2025-05-30 PROBLEM — M48.00 SPINAL STENOSIS: Status: ACTIVE | Noted: 2025-05-30

## 2025-05-30 LAB
ABO GROUP (TYPE) IN BLOOD: NORMAL
ACT BLD: 104 SEC (ref 82–174)
ACT BLD: 118 SEC (ref 82–174)
ACT BLD: 120 SEC (ref 82–174)
ACT BLD: 451 SEC (ref 82–174)
ACT BLD: 453 SEC (ref 82–174)
ACT BLD: 457 SEC (ref 82–174)
ACT BLD: 479 SEC (ref 82–174)
ACT BLD: 520 SEC (ref 82–174)
ACT BLD: <76 SEC (ref 82–174)
ALBUMIN SERPL BCP-MCNC: 3.7 G/DL (ref 3.4–5)
ANION GAP BLDA CALCULATED.4IONS-SCNC: 10 MMO/L (ref 10–25)
ANION GAP BLDA CALCULATED.4IONS-SCNC: 10 MMO/L (ref 10–25)
ANION GAP BLDA CALCULATED.4IONS-SCNC: 11 MMO/L (ref 10–25)
ANION GAP BLDA CALCULATED.4IONS-SCNC: 11 MMO/L (ref 10–25)
ANION GAP BLDA CALCULATED.4IONS-SCNC: 12 MMO/L (ref 10–25)
ANION GAP BLDA CALCULATED.4IONS-SCNC: 15 MMO/L (ref 10–25)
ANION GAP BLDA CALCULATED.4IONS-SCNC: 16 MMO/L (ref 10–25)
ANION GAP BLDA CALCULATED.4IONS-SCNC: 16 MMO/L (ref 10–25)
ANION GAP BLDA CALCULATED.4IONS-SCNC: 7 MMO/L (ref 10–25)
ANION GAP BLDA CALCULATED.4IONS-SCNC: 9 MMO/L (ref 10–25)
ANION GAP SERPL CALC-SCNC: 18 MMOL/L (ref 10–20)
APTT PPP: 27 SECONDS (ref 26–36)
BASE EXCESS BLDA CALC-SCNC: -0.8 MMOL/L (ref -2–3)
BASE EXCESS BLDA CALC-SCNC: -1.5 MMOL/L (ref -2–3)
BASE EXCESS BLDA CALC-SCNC: -2.1 MMOL/L (ref -2–3)
BASE EXCESS BLDA CALC-SCNC: -3.2 MMOL/L (ref -2–3)
BASE EXCESS BLDA CALC-SCNC: -3.5 MMOL/L (ref -2–3)
BASE EXCESS BLDA CALC-SCNC: -3.8 MMOL/L (ref -2–3)
BASE EXCESS BLDA CALC-SCNC: -4.1 MMOL/L (ref -2–3)
BASE EXCESS BLDA CALC-SCNC: -4.7 MMOL/L (ref -2–3)
BASE EXCESS BLDA CALC-SCNC: -5.1 MMOL/L (ref -2–3)
BASE EXCESS BLDA CALC-SCNC: -5.3 MMOL/L (ref -2–3)
BASE EXCESS BLDA CALC-SCNC: -6.3 MMOL/L (ref -2–3)
BASE EXCESS BLDA CALC-SCNC: -6.4 MMOL/L (ref -2–3)
BASE EXCESS BLDA CALC-SCNC: -8.4 MMOL/L (ref -2–3)
BASE EXCESS BLDV CALC-SCNC: -2.6 MMOL/L (ref -2–3)
BLOOD EXPIRATION DATE: NORMAL
BODY TEMPERATURE: 37 DEGREES CELSIUS
BUN SERPL-MCNC: 22 MG/DL (ref 6–23)
CA-I BLDA-SCNC: 1.12 MMOL/L (ref 1.1–1.33)
CA-I BLDA-SCNC: 1.13 MMOL/L (ref 1.1–1.33)
CA-I BLDA-SCNC: 1.14 MMOL/L (ref 1.1–1.33)
CA-I BLDA-SCNC: 1.14 MMOL/L (ref 1.1–1.33)
CA-I BLDA-SCNC: 1.15 MMOL/L (ref 1.1–1.33)
CA-I BLDA-SCNC: 1.16 MMOL/L (ref 1.1–1.33)
CA-I BLDA-SCNC: 1.16 MMOL/L (ref 1.1–1.33)
CA-I BLDA-SCNC: 1.18 MMOL/L (ref 1.1–1.33)
CA-I BLDA-SCNC: 1.21 MMOL/L (ref 1.1–1.33)
CA-I BLDA-SCNC: 1.21 MMOL/L (ref 1.1–1.33)
CA-I BLDA-SCNC: 1.23 MMOL/L (ref 1.1–1.33)
CA-I BLDA-SCNC: 1.23 MMOL/L (ref 1.1–1.33)
CA-I BLDA-SCNC: 1.32 MMOL/L (ref 1.1–1.33)
CALCIUM SERPL-MCNC: 8.1 MG/DL (ref 8.6–10.6)
CFT FORM KAOLIN IND BLD RES TEG: 0.9 MIN (ref 0.8–2.1)
CFT FORM KAOLIN IND BLD RES TEG: 1 MIN (ref 0.8–2.1)
CHLORIDE BLDA-SCNC: 108 MMOL/L (ref 98–107)
CHLORIDE BLDA-SCNC: 108 MMOL/L (ref 98–107)
CHLORIDE BLDA-SCNC: 109 MMOL/L (ref 98–107)
CHLORIDE BLDA-SCNC: 110 MMOL/L (ref 98–107)
CHLORIDE BLDA-SCNC: 111 MMOL/L (ref 98–107)
CHLORIDE SERPL-SCNC: 110 MMOL/L (ref 98–107)
CLOT ANGLE.KAOLIN INDUCED BLD RES TEG: 74 DEG (ref 63–78)
CLOT ANGLE.KAOLIN INDUCED BLD RES TEG: 77 DEG (ref 63–78)
CLOT INIT KAO IND P HEP NEUT BLD RES TEG: 5.2 MIN (ref 4.3–8.3)
CLOT INIT KAO IND P HEP NEUT BLD RES TEG: 5.4 MIN (ref 4.6–9.1)
CLOT INIT KAO IND P HEP NEUT BLD RES TEG: 6.8 MIN (ref 4.3–8.3)
CLOT INIT KAO IND P HEP NEUT BLD RES TEG: 7.4 MIN (ref 4.6–9.1)
CO2 SERPL-SCNC: 20 MMOL/L (ref 21–32)
COHGB MFR BLDA: 0.4 %
COHGB MFR BLDA: 0.8 %
CREAT SERPL-MCNC: 1.61 MG/DL (ref 0.5–1.3)
DISPENSE STATUS: NORMAL
DO-HGB MFR BLDA: 0.7 % (ref 0–5)
DO-HGB MFR BLDA: 0.8 % (ref 0–5)
EGFRCR SERPLBLD CKD-EPI 2021: 44 ML/MIN/1.73M*2
EJECTION FRACTION: 63 %
ERYTHROCYTE [DISTWIDTH] IN BLOOD BY AUTOMATED COUNT: 13.7 % (ref 11.5–14.5)
FIBRINOGEN BLD CALC-MCNC: 491 MG/DL (ref 278–581)
FIBRINOGEN BLD CALC-MCNC: 584 MG/DL (ref 278–581)
FIBRINOGEN PPP-MCNC: 246 MG/DL (ref 200–400)
GLUCOSE BLD MANUAL STRIP-MCNC: 125 MG/DL (ref 74–99)
GLUCOSE BLD MANUAL STRIP-MCNC: 143 MG/DL (ref 74–99)
GLUCOSE BLD MANUAL STRIP-MCNC: 198 MG/DL (ref 74–99)
GLUCOSE BLD MANUAL STRIP-MCNC: 233 MG/DL (ref 74–99)
GLUCOSE BLDA-MCNC: 140 MG/DL (ref 74–99)
GLUCOSE BLDA-MCNC: 142 MG/DL (ref 74–99)
GLUCOSE BLDA-MCNC: 146 MG/DL (ref 74–99)
GLUCOSE BLDA-MCNC: 147 MG/DL (ref 74–99)
GLUCOSE BLDA-MCNC: 150 MG/DL (ref 74–99)
GLUCOSE BLDA-MCNC: 153 MG/DL (ref 74–99)
GLUCOSE BLDA-MCNC: 156 MG/DL (ref 74–99)
GLUCOSE BLDA-MCNC: 157 MG/DL (ref 74–99)
GLUCOSE BLDA-MCNC: 170 MG/DL (ref 74–99)
GLUCOSE BLDA-MCNC: 176 MG/DL (ref 74–99)
GLUCOSE BLDA-MCNC: 197 MG/DL (ref 74–99)
GLUCOSE BLDA-MCNC: 219 MG/DL (ref 74–99)
GLUCOSE BLDA-MCNC: 229 MG/DL (ref 74–99)
GLUCOSE SERPL-MCNC: 164 MG/DL (ref 74–99)
HCO3 BLDA-SCNC: 17.2 MMOL/L (ref 22–26)
HCO3 BLDA-SCNC: 17.6 MMOL/L (ref 22–26)
HCO3 BLDA-SCNC: 19.7 MMOL/L (ref 22–26)
HCO3 BLDA-SCNC: 20 MMOL/L (ref 22–26)
HCO3 BLDA-SCNC: 20.4 MMOL/L (ref 22–26)
HCO3 BLDA-SCNC: 20.6 MMOL/L (ref 22–26)
HCO3 BLDA-SCNC: 21.6 MMOL/L (ref 22–26)
HCO3 BLDA-SCNC: 22.1 MMOL/L (ref 22–26)
HCO3 BLDA-SCNC: 22.2 MMOL/L (ref 22–26)
HCO3 BLDA-SCNC: 22.7 MMOL/L (ref 22–26)
HCO3 BLDA-SCNC: 23.5 MMOL/L (ref 22–26)
HCO3 BLDA-SCNC: 23.7 MMOL/L (ref 22–26)
HCO3 BLDA-SCNC: 24.2 MMOL/L (ref 22–26)
HCO3 BLDV-SCNC: 24.9 MMOL/L (ref 22–26)
HCT VFR BLD AUTO: 32.9 % (ref 41–52)
HCT VFR BLD EST: 29 % (ref 41–52)
HCT VFR BLD EST: 29 % (ref 41–52)
HCT VFR BLD EST: 30 % (ref 41–52)
HCT VFR BLD EST: 30 % (ref 41–52)
HCT VFR BLD EST: 31 % (ref 41–52)
HCT VFR BLD EST: 32 % (ref 41–52)
HCT VFR BLD EST: 32 % (ref 41–52)
HCT VFR BLD EST: 33 % (ref 41–52)
HCT VFR BLD EST: 34 % (ref 41–52)
HCT VFR BLD EST: 35 % (ref 41–52)
HCT VFR BLD EST: 35 % (ref 41–52)
HCT VFR BLD EST: 36 % (ref 41–52)
HCT VFR BLD EST: 39 % (ref 41–52)
HGB BLD-MCNC: 10.7 G/DL (ref 13.5–17.5)
HGB BLDA-MCNC: 10.1 G/DL (ref 13.5–17.5)
HGB BLDA-MCNC: 10.2 G/DL (ref 13.5–17.5)
HGB BLDA-MCNC: 10.6 G/DL (ref 13.5–17.5)
HGB BLDA-MCNC: 10.6 G/DL (ref 13.5–17.5)
HGB BLDA-MCNC: 11 G/DL (ref 13.5–17.5)
HGB BLDA-MCNC: 11 G/DL (ref 13.5–17.5)
HGB BLDA-MCNC: 11.4 G/DL (ref 13.5–17.5)
HGB BLDA-MCNC: 11.5 G/DL (ref 13.5–17.5)
HGB BLDA-MCNC: 11.6 G/DL (ref 13.5–17.5)
HGB BLDA-MCNC: 11.9 G/DL (ref 13.5–17.5)
HGB BLDA-MCNC: 13 G/DL (ref 13.5–17.5)
HGB BLDA-MCNC: 9.6 G/DL (ref 13.5–17.5)
HGB BLDA-MCNC: 9.9 G/DL (ref 13.5–17.5)
INHALED O2 CONCENTRATION: 100 %
INHALED O2 CONCENTRATION: 21 %
INHALED O2 CONCENTRATION: 37 %
INHALED O2 CONCENTRATION: 40 %
INHALED O2 CONCENTRATION: 50 %
INHALED O2 CONCENTRATION: 65 %
INHALED O2 CONCENTRATION: 70 %
INHALED O2 CONCENTRATION: 75 %
INHALED O2 CONCENTRATION: 75 %
INHALED O2 CONCENTRATION: 80 %
INHALED O2 CONCENTRATION: 90 %
INR PPP: 1.2 (ref 0.9–1.1)
LACTATE BLDA-SCNC: 0.5 MMOL/L (ref 0.4–2)
LACTATE BLDA-SCNC: 0.7 MMOL/L (ref 0.4–2)
LACTATE BLDA-SCNC: 0.8 MMOL/L (ref 0.4–2)
LACTATE BLDA-SCNC: 0.9 MMOL/L (ref 0.4–2)
LACTATE BLDA-SCNC: 1.1 MMOL/L (ref 0.4–2)
LACTATE BLDA-SCNC: 1.2 MMOL/L (ref 0.4–2)
LACTATE BLDA-SCNC: 1.4 MMOL/L (ref 0.4–2)
LACTATE BLDA-SCNC: 2.4 MMOL/L (ref 0.4–2)
LACTATE BLDA-SCNC: 2.9 MMOL/L (ref 0.4–2)
LACTATE BLDA-SCNC: 3.5 MMOL/L (ref 0.4–2)
LACTATE BLDA-SCNC: 3.8 MMOL/L (ref 0.4–2)
LACTATE BLDA-SCNC: 4.1 MMOL/L (ref 0.4–2)
LACTATE BLDA-SCNC: 5 MMOL/L (ref 0.4–2)
MA KAOLIN BLD RES TEG: 66 MM (ref 52–69)
MA KAOLIN BLD RES TEG: 67 MM (ref 52–69)
MA KAOLIN+TF BLD RES TEG: 65 MM (ref 52–70)
MA KAOLIN+TF BLD RES TEG: 69 MM (ref 52–70)
MA TF IND+IIB-IIIA INH BLD RES TEG: 27 MM (ref 15–32)
MA TF IND+IIB-IIIA INH BLD RES TEG: 32 MM (ref 15–32)
MAGNESIUM SERPL-MCNC: 3.37 MG/DL (ref 1.6–2.4)
MCH RBC QN AUTO: 30.7 PG (ref 26–34)
MCHC RBC AUTO-ENTMCNC: 32.5 G/DL (ref 32–36)
MCV RBC AUTO: 94 FL (ref 80–100)
METHGB MFR BLDA: 0.7 % (ref 0–1.5)
METHGB MFR BLDA: 1.3 % (ref 0–1.5)
NRBC BLD-RTO: 0 /100 WBCS (ref 0–0)
OXYHGB MFR BLDA: 91.3 % (ref 94–98)
OXYHGB MFR BLDA: 95 % (ref 94–98)
OXYHGB MFR BLDA: 95.8 % (ref 94–98)
OXYHGB MFR BLDA: 95.9 % (ref 94–98)
OXYHGB MFR BLDA: 97.2 % (ref 94–98)
OXYHGB MFR BLDA: 97.2 % (ref 94–98)
OXYHGB MFR BLDA: 97.8 % (ref 94–98)
OXYHGB MFR BLDA: 97.9 % (ref 94–98)
OXYHGB MFR BLDA: 98.1 % (ref 94–98)
OXYHGB MFR BLDA: 98.2 % (ref 94–98)
OXYHGB MFR BLDA: 98.5 % (ref 94–98)
OXYHGB MFR BLDV: 87.1 % (ref 45–75)
PCO2 BLDA: 29 MM HG (ref 38–42)
PCO2 BLDA: 35 MM HG (ref 38–42)
PCO2 BLDA: 37 MM HG (ref 38–42)
PCO2 BLDA: 40 MM HG (ref 38–42)
PCO2 BLDA: 40 MM HG (ref 38–42)
PCO2 BLDA: 41 MM HG (ref 38–42)
PCO2 BLDA: 43 MM HG (ref 38–42)
PCO2 BLDA: 43 MM HG (ref 38–42)
PCO2 BLDA: 47 MM HG (ref 38–42)
PCO2 BLDV: 53 MM HG (ref 41–51)
PH BLDA: 7.3 PH (ref 7.38–7.42)
PH BLDA: 7.3 PH (ref 7.38–7.42)
PH BLDA: 7.32 PH (ref 7.38–7.42)
PH BLDA: 7.33 PH (ref 7.38–7.42)
PH BLDA: 7.33 PH (ref 7.38–7.42)
PH BLDA: 7.34 PH (ref 7.38–7.42)
PH BLDA: 7.34 PH (ref 7.38–7.42)
PH BLDA: 7.35 PH (ref 7.38–7.42)
PH BLDA: 7.37 PH (ref 7.38–7.42)
PH BLDA: 7.39 PH (ref 7.38–7.42)
PH BLDA: 7.41 PH (ref 7.38–7.42)
PH BLDV: 7.28 PH (ref 7.33–7.43)
PHOSPHATE SERPL-MCNC: 2.6 MG/DL (ref 2.5–4.9)
PLATELET # BLD AUTO: 141 X10*3/UL (ref 150–450)
PO2 BLDA: 124 MM HG (ref 85–95)
PO2 BLDA: 143 MM HG (ref 85–95)
PO2 BLDA: 176 MM HG (ref 85–95)
PO2 BLDA: 190 MM HG (ref 85–95)
PO2 BLDA: 208 MM HG (ref 85–95)
PO2 BLDA: 227 MM HG (ref 85–95)
PO2 BLDA: 256 MM HG (ref 85–95)
PO2 BLDA: 264 MM HG (ref 85–95)
PO2 BLDA: 300 MM HG (ref 85–95)
PO2 BLDA: 64 MM HG (ref 85–95)
PO2 BLDA: 69 MM HG (ref 85–95)
PO2 BLDA: 96 MM HG (ref 85–95)
PO2 BLDA: 97 MM HG (ref 85–95)
PO2 BLDV: 57 MM HG (ref 35–45)
POTASSIUM BLDA-SCNC: 4 MMOL/L (ref 3.5–5.3)
POTASSIUM BLDA-SCNC: 4.1 MMOL/L (ref 3.5–5.3)
POTASSIUM BLDA-SCNC: 4.1 MMOL/L (ref 3.5–5.3)
POTASSIUM BLDA-SCNC: 4.2 MMOL/L (ref 3.5–5.3)
POTASSIUM BLDA-SCNC: 4.3 MMOL/L (ref 3.5–5.3)
POTASSIUM BLDA-SCNC: 4.4 MMOL/L (ref 3.5–5.3)
POTASSIUM BLDA-SCNC: 4.6 MMOL/L (ref 3.5–5.3)
POTASSIUM BLDA-SCNC: 4.9 MMOL/L (ref 3.5–5.3)
POTASSIUM BLDA-SCNC: 5.1 MMOL/L (ref 3.5–5.3)
POTASSIUM BLDA-SCNC: 5.5 MMOL/L (ref 3.5–5.3)
POTASSIUM BLDA-SCNC: 6.1 MMOL/L (ref 3.5–5.3)
POTASSIUM SERPL-SCNC: 4.1 MMOL/L (ref 3.5–5.3)
PRODUCT BLOOD TYPE: 6200
PRODUCT CODE: NORMAL
PROTHROMBIN TIME: 12.9 SECONDS (ref 9.8–12.4)
RBC # BLD AUTO: 3.49 X10*6/UL (ref 4.5–5.9)
RH FACTOR (ANTIGEN D): NORMAL
SAO2 % BLDA: 100 % (ref 94–100)
SAO2 % BLDA: 93 % (ref 94–100)
SAO2 % BLDA: 97 % (ref 94–100)
SAO2 % BLDA: 98 % (ref 94–100)
SAO2 % BLDA: 99 % (ref 94–100)
SAO2 % BLDV: 88 % (ref 45–75)
SODIUM BLDA-SCNC: 135 MMOL/L (ref 136–145)
SODIUM BLDA-SCNC: 135 MMOL/L (ref 136–145)
SODIUM BLDA-SCNC: 136 MMOL/L (ref 136–145)
SODIUM BLDA-SCNC: 137 MMOL/L (ref 136–145)
SODIUM BLDA-SCNC: 137 MMOL/L (ref 136–145)
SODIUM BLDA-SCNC: 138 MMOL/L (ref 136–145)
SODIUM BLDA-SCNC: 139 MMOL/L (ref 136–145)
SODIUM SERPL-SCNC: 144 MMOL/L (ref 136–145)
TEST COMMENT: ABNORMAL
TEST COMMENT: NORMAL
UNIT ABO: NORMAL
UNIT NUMBER: NORMAL
UNIT RH: NORMAL
UNIT VOLUME: 296
UNIT VOLUME: 298
UNIT VOLUME: 350
WBC # BLD AUTO: 18.5 X10*3/UL (ref 4.4–11.3)
XM INTEP: NORMAL

## 2025-05-30 PROCEDURE — 5A1221Z PERFORMANCE OF CARDIAC OUTPUT, CONTINUOUS: ICD-10-PCS | Performed by: STUDENT IN AN ORGANIZED HEALTH CARE EDUCATION/TRAINING PROGRAM

## 2025-05-30 PROCEDURE — 85610 PROTHROMBIN TIME: CPT | Performed by: NURSE PRACTITIONER

## 2025-05-30 PROCEDURE — 93005 ELECTROCARDIOGRAM TRACING: CPT

## 2025-05-30 PROCEDURE — 36415 COLL VENOUS BLD VENIPUNCTURE: CPT | Performed by: ANESTHESIOLOGY

## 2025-05-30 PROCEDURE — 85730 THROMBOPLASTIN TIME PARTIAL: CPT | Performed by: NURSE PRACTITIONER

## 2025-05-30 PROCEDURE — 82435 ASSAY OF BLOOD CHLORIDE: CPT | Performed by: NURSE PRACTITIONER

## 2025-05-30 PROCEDURE — 2500000004 HC RX 250 GENERAL PHARMACY W/ HCPCS (ALT 636 FOR OP/ED): Performed by: STUDENT IN AN ORGANIZED HEALTH CARE EDUCATION/TRAINING PROGRAM

## 2025-05-30 PROCEDURE — 2780000003 HC OR 278 NO HCPCS: Performed by: STUDENT IN AN ORGANIZED HEALTH CARE EDUCATION/TRAINING PROGRAM

## 2025-05-30 PROCEDURE — 71045 X-RAY EXAM CHEST 1 VIEW: CPT

## 2025-05-30 PROCEDURE — 02L70CK OCCLUSION OF LEFT ATRIAL APPENDAGE WITH EXTRALUMINAL DEVICE, OPEN APPROACH: ICD-10-PCS | Performed by: STUDENT IN AN ORGANIZED HEALTH CARE EDUCATION/TRAINING PROGRAM

## 2025-05-30 PROCEDURE — 02100Z9 BYPASS CORONARY ARTERY, ONE ARTERY FROM LEFT INTERNAL MAMMARY, OPEN APPROACH: ICD-10-PCS | Performed by: STUDENT IN AN ORGANIZED HEALTH CARE EDUCATION/TRAINING PROGRAM

## 2025-05-30 PROCEDURE — 83735 ASSAY OF MAGNESIUM: CPT | Performed by: NURSE PRACTITIONER

## 2025-05-30 PROCEDURE — 33508 ENDOSCOPIC VEIN HARVEST: CPT | Performed by: STUDENT IN AN ORGANIZED HEALTH CARE EDUCATION/TRAINING PROGRAM

## 2025-05-30 PROCEDURE — 2500000004 HC RX 250 GENERAL PHARMACY W/ HCPCS (ALT 636 FOR OP/ED)

## 2025-05-30 PROCEDURE — 2500000002 HC RX 250 W HCPCS SELF ADMINISTERED DRUGS (ALT 637 FOR MEDICARE OP, ALT 636 FOR OP/ED)

## 2025-05-30 PROCEDURE — 85018 HEMOGLOBIN: CPT

## 2025-05-30 PROCEDURE — 99291 CRITICAL CARE FIRST HOUR: CPT | Performed by: ANESTHESIOLOGY

## 2025-05-30 PROCEDURE — 3600000012 HC PERFUSION TIME - EACH INCREMENTAL 1 MINUTE: Performed by: STUDENT IN AN ORGANIZED HEALTH CARE EDUCATION/TRAINING PROGRAM

## 2025-05-30 PROCEDURE — 021109W BYPASS CORONARY ARTERY, TWO ARTERIES FROM AORTA WITH AUTOLOGOUS VENOUS TISSUE, OPEN APPROACH: ICD-10-PCS | Performed by: STUDENT IN AN ORGANIZED HEALTH CARE EDUCATION/TRAINING PROGRAM

## 2025-05-30 PROCEDURE — 99231 SBSQ HOSP IP/OBS SF/LOW 25: CPT

## 2025-05-30 PROCEDURE — 93010 ELECTROCARDIOGRAM REPORT: CPT | Performed by: INTERNAL MEDICINE

## 2025-05-30 PROCEDURE — 82947 ASSAY GLUCOSE BLOOD QUANT: CPT

## 2025-05-30 PROCEDURE — 84132 ASSAY OF SERUM POTASSIUM: CPT | Performed by: NURSE PRACTITIONER

## 2025-05-30 PROCEDURE — 85347 COAGULATION TIME ACTIVATED: CPT

## 2025-05-30 PROCEDURE — 2500000005 HC RX 250 GENERAL PHARMACY W/O HCPCS

## 2025-05-30 PROCEDURE — 37799 UNLISTED PX VASCULAR SURGERY: CPT | Performed by: NURSE PRACTITIONER

## 2025-05-30 PROCEDURE — 33405 REPLACEMENT AORTIC VALVE OPN: CPT | Performed by: STUDENT IN AN ORGANIZED HEALTH CARE EDUCATION/TRAINING PROGRAM

## 2025-05-30 PROCEDURE — 3E043XZ INTRODUCTION OF VASOPRESSOR INTO CENTRAL VEIN, PERCUTANEOUS APPROACH: ICD-10-PCS

## 2025-05-30 PROCEDURE — P9045 ALBUMIN (HUMAN), 5%, 250 ML: HCPCS | Mod: JZ

## 2025-05-30 PROCEDURE — 88311 DECALCIFY TISSUE: CPT | Performed by: PATHOLOGY

## 2025-05-30 PROCEDURE — 93325 DOPPLER ECHO COLOR FLOW MAPG: CPT

## 2025-05-30 PROCEDURE — 3700000001 HC GENERAL ANESTHESIA TIME - INITIAL BASE CHARGE: Performed by: STUDENT IN AN ORGANIZED HEALTH CARE EDUCATION/TRAINING PROGRAM

## 2025-05-30 PROCEDURE — 33257 ABLATE ATRIA LMTD ADD-ON: CPT | Performed by: STUDENT IN AN ORGANIZED HEALTH CARE EDUCATION/TRAINING PROGRAM

## 2025-05-30 PROCEDURE — 82375 ASSAY CARBOXYHB QUANT: CPT

## 2025-05-30 PROCEDURE — 93325 DOPPLER ECHO COLOR FLOW MAPG: CPT | Performed by: ANESTHESIOLOGY

## 2025-05-30 PROCEDURE — 99291 CRITICAL CARE FIRST HOUR: CPT | Performed by: NURSE PRACTITIONER

## 2025-05-30 PROCEDURE — 74018 RADEX ABDOMEN 1 VIEW: CPT

## 2025-05-30 PROCEDURE — 88305 TISSUE EXAM BY PATHOLOGIST: CPT | Performed by: PATHOLOGY

## 2025-05-30 PROCEDURE — 85384 FIBRINOGEN ACTIVITY: CPT | Performed by: NURSE PRACTITIONER

## 2025-05-30 PROCEDURE — 02RF08Z REPLACEMENT OF AORTIC VALVE WITH ZOOPLASTIC TISSUE, OPEN APPROACH: ICD-10-PCS | Performed by: STUDENT IN AN ORGANIZED HEALTH CARE EDUCATION/TRAINING PROGRAM

## 2025-05-30 PROCEDURE — 33533 CABG ARTERIAL SINGLE: CPT | Performed by: STUDENT IN AN ORGANIZED HEALTH CARE EDUCATION/TRAINING PROGRAM

## 2025-05-30 PROCEDURE — 2020000001 HC ICU ROOM DAILY

## 2025-05-30 PROCEDURE — 3600000011 HC PERFUSION TIME - INITIAL BASE CHARGE: Performed by: STUDENT IN AN ORGANIZED HEALTH CARE EDUCATION/TRAINING PROGRAM

## 2025-05-30 PROCEDURE — C1889 IMPLANT/INSERT DEVICE, NOC: HCPCS | Performed by: STUDENT IN AN ORGANIZED HEALTH CARE EDUCATION/TRAINING PROGRAM

## 2025-05-30 PROCEDURE — 2500000004 HC RX 250 GENERAL PHARMACY W/ HCPCS (ALT 636 FOR OP/ED): Mod: JZ | Performed by: NURSE PRACTITIONER

## 2025-05-30 PROCEDURE — 85384 FIBRINOGEN ACTIVITY: CPT

## 2025-05-30 PROCEDURE — 3700000002 HC GENERAL ANESTHESIA TIME - EACH INCREMENTAL 1 MINUTE: Performed by: STUDENT IN AN ORGANIZED HEALTH CARE EDUCATION/TRAINING PROGRAM

## 2025-05-30 PROCEDURE — 93312 ECHO TRANSESOPHAGEAL: CPT | Performed by: ANESTHESIOLOGY

## 2025-05-30 PROCEDURE — P9047 ALBUMIN (HUMAN), 25%, 50ML: HCPCS | Performed by: STUDENT IN AN ORGANIZED HEALTH CARE EDUCATION/TRAINING PROGRAM

## 2025-05-30 PROCEDURE — 74018 RADEX ABDOMEN 1 VIEW: CPT | Performed by: RADIOLOGY

## 2025-05-30 PROCEDURE — 3600000017 HC OR TIME - EACH INCREMENTAL 1 MINUTE - PROCEDURE LEVEL SIX: Performed by: STUDENT IN AN ORGANIZED HEALTH CARE EDUCATION/TRAINING PROGRAM

## 2025-05-30 PROCEDURE — 2720000007 HC OR 272 NO HCPCS: Performed by: STUDENT IN AN ORGANIZED HEALTH CARE EDUCATION/TRAINING PROGRAM

## 2025-05-30 PROCEDURE — 71045 X-RAY EXAM CHEST 1 VIEW: CPT | Performed by: RADIOLOGY

## 2025-05-30 PROCEDURE — 06BP4ZZ EXCISION OF RIGHT SAPHENOUS VEIN, PERCUTANEOUS ENDOSCOPIC APPROACH: ICD-10-PCS | Performed by: STUDENT IN AN ORGANIZED HEALTH CARE EDUCATION/TRAINING PROGRAM

## 2025-05-30 PROCEDURE — 85027 COMPLETE CBC AUTOMATED: CPT | Performed by: NURSE PRACTITIONER

## 2025-05-30 PROCEDURE — 2500000005 HC RX 250 GENERAL PHARMACY W/O HCPCS: Performed by: STUDENT IN AN ORGANIZED HEALTH CARE EDUCATION/TRAINING PROGRAM

## 2025-05-30 PROCEDURE — 33518 CABG ARTERY-VEIN TWO: CPT | Performed by: STUDENT IN AN ORGANIZED HEALTH CARE EDUCATION/TRAINING PROGRAM

## 2025-05-30 PROCEDURE — 84132 ASSAY OF SERUM POTASSIUM: CPT

## 2025-05-30 PROCEDURE — 3600000018 HC OR TIME - INITIAL BASE CHARGE - PROCEDURE LEVEL SIX: Performed by: STUDENT IN AN ORGANIZED HEALTH CARE EDUCATION/TRAINING PROGRAM

## 2025-05-30 PROCEDURE — 93321 DOPPLER ECHO F-UP/LMTD STD: CPT | Performed by: ANESTHESIOLOGY

## 2025-05-30 PROCEDURE — 2500000004 HC RX 250 GENERAL PHARMACY W/ HCPCS (ALT 636 FOR OP/ED): Mod: JZ

## 2025-05-30 PROCEDURE — 2500000001 HC RX 250 WO HCPCS SELF ADMINISTERED DRUGS (ALT 637 FOR MEDICARE OP): Performed by: NURSE PRACTITIONER

## 2025-05-30 PROCEDURE — 82805 BLOOD GASES W/O2 SATURATION: CPT

## 2025-05-30 PROCEDURE — 88305 TISSUE EXAM BY PATHOLOGIST: CPT | Mod: TC,SUR | Performed by: STUDENT IN AN ORGANIZED HEALTH CARE EDUCATION/TRAINING PROGRAM

## 2025-05-30 PROCEDURE — 82435 ASSAY OF BLOOD CHLORIDE: CPT

## 2025-05-30 PROCEDURE — 02580ZZ DESTRUCTION OF CONDUCTION MECHANISM, OPEN APPROACH: ICD-10-PCS | Performed by: STUDENT IN AN ORGANIZED HEALTH CARE EDUCATION/TRAINING PROGRAM

## 2025-05-30 PROCEDURE — A4649 SURGICAL SUPPLIES: HCPCS | Performed by: STUDENT IN AN ORGANIZED HEALTH CARE EDUCATION/TRAINING PROGRAM

## 2025-05-30 PROCEDURE — A4312 CATH W/O BAG 2-WAY SILICONE: HCPCS | Performed by: STUDENT IN AN ORGANIZED HEALTH CARE EDUCATION/TRAINING PROGRAM

## 2025-05-30 PROCEDURE — 94002 VENT MGMT INPAT INIT DAY: CPT

## 2025-05-30 PROCEDURE — 2500000005 HC RX 250 GENERAL PHARMACY W/O HCPCS: Performed by: NURSE PRACTITIONER

## 2025-05-30 DEVICE — PLATE KIT, AUXILIARYCABLE, SHARP NEEDLE, XP RIGID FIXATION: Type: IMPLANTABLE DEVICE | Site: STERNUM | Status: FUNCTIONAL

## 2025-05-30 DEVICE — PLATE KIT, BOX CABLE, XP RIGID FIXATION: Type: IMPLANTABLE DEVICE | Site: STERNUM | Status: FUNCTIONAL

## 2025-05-30 DEVICE — ATRICLIP FLEX•V DEVICE 45
Type: IMPLANTABLE DEVICE | Site: HEART | Status: FUNCTIONAL
Brand: ATRICLIP FLEX•V

## 2025-05-30 DEVICE — X CABLE PLATE KIT (INC. SCREWS & CABLE)
Type: IMPLANTABLE DEVICE | Site: STERNUM | Status: FUNCTIONAL
Brand: A&E MEDICAL / THORECON™ X CABLE PLATE KIT (INC. SCREWS & CABLE)

## 2025-05-30 DEVICE — INSPIRIS RESILIA AORTIC VALVE
Type: IMPLANTABLE DEVICE | Site: HEART | Status: FUNCTIONAL
Brand: INSPIRIS RESILIA AORTIC VALVE

## 2025-05-30 RX ORDER — ALBUMIN HUMAN 50 G/1000ML
SOLUTION INTRAVENOUS AS NEEDED
Status: DISCONTINUED | OUTPATIENT
Start: 2025-05-30 | End: 2025-05-30

## 2025-05-30 RX ORDER — NITROGLYCERIN 20 MG/100ML
INJECTION INTRAVENOUS CONTINUOUS PRN
Status: DISCONTINUED | OUTPATIENT
Start: 2025-05-30 | End: 2025-05-30

## 2025-05-30 RX ORDER — POTASSIUM CHLORIDE 20 MEQ/1
20 TABLET, EXTENDED RELEASE ORAL EVERY 6 HOURS PRN
Status: DISCONTINUED | OUTPATIENT
Start: 2025-05-30 | End: 2025-06-01

## 2025-05-30 RX ORDER — INDOMETHACIN 25 MG/1
CAPSULE ORAL AS NEEDED
Status: DISCONTINUED | OUTPATIENT
Start: 2025-05-30 | End: 2025-05-30

## 2025-05-30 RX ORDER — NOREPINEPHRINE BITARTRATE 0.03 MG/ML
INJECTION, SOLUTION INTRAVENOUS CONTINUOUS PRN
Status: DISCONTINUED | OUTPATIENT
Start: 2025-05-30 | End: 2025-05-30

## 2025-05-30 RX ORDER — POTASSIUM CHLORIDE 1.5 G/1.58G
20 POWDER, FOR SOLUTION ORAL EVERY 6 HOURS PRN
Status: DISCONTINUED | OUTPATIENT
Start: 2025-05-30 | End: 2025-06-01

## 2025-05-30 RX ORDER — PAPAVERINE HYDROCHLORIDE 30 MG/ML
INJECTION INTRAMUSCULAR; INTRAVENOUS AS NEEDED
Status: DISCONTINUED | OUTPATIENT
Start: 2025-05-30 | End: 2025-05-30 | Stop reason: HOSPADM

## 2025-05-30 RX ORDER — NALOXONE HYDROCHLORIDE 0.4 MG/ML
0.2 INJECTION, SOLUTION INTRAMUSCULAR; INTRAVENOUS; SUBCUTANEOUS EVERY 5 MIN PRN
Status: DISCONTINUED | OUTPATIENT
Start: 2025-05-30 | End: 2025-06-05 | Stop reason: HOSPADM

## 2025-05-30 RX ORDER — SODIUM CHLORIDE, SODIUM GLUCONATE, SODIUM ACETATE, POTASSIUM CHLORIDE AND MAGNESIUM CHLORIDE 30; 37; 368; 526; 502 MG/100ML; MG/100ML; MG/100ML; MG/100ML; MG/100ML
INJECTION, SOLUTION INTRAVENOUS CONTINUOUS PRN
Status: DISCONTINUED | OUTPATIENT
Start: 2025-05-30 | End: 2025-05-30

## 2025-05-30 RX ORDER — CALCIUM GLUCONATE 20 MG/ML
2 INJECTION, SOLUTION INTRAVENOUS EVERY 6 HOURS PRN
Status: DISCONTINUED | OUTPATIENT
Start: 2025-05-30 | End: 2025-06-01

## 2025-05-30 RX ORDER — SODIUM CHLORIDE, SODIUM LACTATE, POTASSIUM CHLORIDE, CALCIUM CHLORIDE 600; 310; 30; 20 MG/100ML; MG/100ML; MG/100ML; MG/100ML
30 INJECTION, SOLUTION INTRAVENOUS CONTINUOUS
Status: DISCONTINUED | OUTPATIENT
Start: 2025-05-30 | End: 2025-05-31

## 2025-05-30 RX ORDER — MAGNESIUM SULFATE HEPTAHYDRATE 500 MG/ML
INJECTION, SOLUTION INTRAMUSCULAR; INTRAVENOUS AS NEEDED
Status: DISCONTINUED | OUTPATIENT
Start: 2025-05-30 | End: 2025-05-30

## 2025-05-30 RX ORDER — PANTOPRAZOLE SODIUM 40 MG/1
40 TABLET, DELAYED RELEASE ORAL
Status: DISCONTINUED | OUTPATIENT
Start: 2025-05-31 | End: 2025-06-05 | Stop reason: HOSPADM

## 2025-05-30 RX ORDER — FENTANYL CITRATE 50 UG/ML
INJECTION, SOLUTION INTRAMUSCULAR; INTRAVENOUS AS NEEDED
Status: DISCONTINUED | OUTPATIENT
Start: 2025-05-30 | End: 2025-05-30

## 2025-05-30 RX ORDER — HEPARIN SODIUM 1000 [USP'U]/ML
INJECTION, SOLUTION INTRAVENOUS; SUBCUTANEOUS AS NEEDED
Status: DISCONTINUED | OUTPATIENT
Start: 2025-05-30 | End: 2025-05-30 | Stop reason: HOSPADM

## 2025-05-30 RX ORDER — POTASSIUM CHLORIDE 29.8 MG/ML
40 INJECTION INTRAVENOUS EVERY 6 HOURS PRN
Status: DISCONTINUED | OUTPATIENT
Start: 2025-05-30 | End: 2025-06-01

## 2025-05-30 RX ORDER — GLYCOPYRROLATE 0.2 MG/ML
INJECTION INTRAMUSCULAR; INTRAVENOUS AS NEEDED
Status: DISCONTINUED | OUTPATIENT
Start: 2025-05-30 | End: 2025-05-30

## 2025-05-30 RX ORDER — ROCURONIUM BROMIDE 10 MG/ML
INJECTION, SOLUTION INTRAVENOUS AS NEEDED
Status: DISCONTINUED | OUTPATIENT
Start: 2025-05-30 | End: 2025-05-30

## 2025-05-30 RX ORDER — OXYCODONE HYDROCHLORIDE 5 MG/1
5 TABLET ORAL EVERY 4 HOURS PRN
Status: DISCONTINUED | OUTPATIENT
Start: 2025-05-30 | End: 2025-06-04

## 2025-05-30 RX ORDER — NOREPINEPHRINE BITARTRATE/D5W 8 MG/250ML
0-1 PLASTIC BAG, INJECTION (ML) INTRAVENOUS CONTINUOUS
Status: DISCONTINUED | OUTPATIENT
Start: 2025-05-30 | End: 2025-05-30

## 2025-05-30 RX ORDER — INSULIN LISPRO 100 [IU]/ML
0-15 INJECTION, SOLUTION INTRAVENOUS; SUBCUTANEOUS EVERY 4 HOURS
Status: DISCONTINUED | OUTPATIENT
Start: 2025-05-30 | End: 2025-05-31

## 2025-05-30 RX ORDER — PANTOPRAZOLE SODIUM 40 MG/10ML
40 INJECTION, POWDER, LYOPHILIZED, FOR SOLUTION INTRAVENOUS
Status: DISCONTINUED | OUTPATIENT
Start: 2025-05-31 | End: 2025-06-01

## 2025-05-30 RX ORDER — NEOSTIGMINE METHYLSULFATE 1 MG/ML
5 INJECTION INTRAVENOUS ONCE
Status: COMPLETED | OUTPATIENT
Start: 2025-05-30 | End: 2025-05-30

## 2025-05-30 RX ORDER — HEPARIN SODIUM 1000 [USP'U]/ML
INJECTION, SOLUTION INTRAVENOUS; SUBCUTANEOUS AS NEEDED
Status: DISCONTINUED | OUTPATIENT
Start: 2025-05-30 | End: 2025-05-30

## 2025-05-30 RX ORDER — ACETAMINOPHEN 325 MG/1
650 TABLET ORAL EVERY 6 HOURS
Status: DISCONTINUED | OUTPATIENT
Start: 2025-05-30 | End: 2025-06-05 | Stop reason: HOSPADM

## 2025-05-30 RX ORDER — CEFAZOLIN SODIUM 2 G/100ML
2 INJECTION, SOLUTION INTRAVENOUS EVERY 8 HOURS
Status: COMPLETED | OUTPATIENT
Start: 2025-05-30 | End: 2025-06-01

## 2025-05-30 RX ORDER — GLYCOPYRROLATE 0.2 MG/ML
1 INJECTION INTRAMUSCULAR; INTRAVENOUS ONCE
Status: COMPLETED | OUTPATIENT
Start: 2025-05-30 | End: 2025-05-30

## 2025-05-30 RX ORDER — SODIUM CHLORIDE, SODIUM LACTATE, POTASSIUM CHLORIDE, CALCIUM CHLORIDE 600; 310; 30; 20 MG/100ML; MG/100ML; MG/100ML; MG/100ML
5 INJECTION, SOLUTION INTRAVENOUS CONTINUOUS
Status: DISCONTINUED | OUTPATIENT
Start: 2025-05-30 | End: 2025-06-01

## 2025-05-30 RX ORDER — POTASSIUM CHLORIDE 14.9 MG/ML
20 INJECTION INTRAVENOUS EVERY 6 HOURS PRN
Status: DISCONTINUED | OUTPATIENT
Start: 2025-05-30 | End: 2025-06-01

## 2025-05-30 RX ORDER — NITROGLYCERIN 20 MG/100ML
0-200 INJECTION INTRAVENOUS CONTINUOUS
Status: DISCONTINUED | OUTPATIENT
Start: 2025-05-30 | End: 2025-05-31

## 2025-05-30 RX ORDER — PHENYLEPHRINE HCL IN 0.9% NACL 0.4MG/10ML
SYRINGE (ML) INTRAVENOUS AS NEEDED
Status: DISCONTINUED | OUTPATIENT
Start: 2025-05-30 | End: 2025-05-30

## 2025-05-30 RX ORDER — EPINEPHRINE IN 0.9 % SOD CHLOR 4MG/250ML
PLASTIC BAG, INJECTION (ML) INTRAVENOUS CONTINUOUS PRN
Status: DISCONTINUED | OUTPATIENT
Start: 2025-05-30 | End: 2025-05-30

## 2025-05-30 RX ORDER — METOPROLOL TARTRATE 25 MG/1
12.5 TABLET, FILM COATED ORAL 2 TIMES DAILY
Status: DISCONTINUED | OUTPATIENT
Start: 2025-05-30 | End: 2025-05-31

## 2025-05-30 RX ORDER — AMOXICILLIN 250 MG
2 CAPSULE ORAL 2 TIMES DAILY
Status: DISCONTINUED | OUTPATIENT
Start: 2025-05-30 | End: 2025-06-05 | Stop reason: HOSPADM

## 2025-05-30 RX ORDER — CALCIUM GLUCONATE 20 MG/ML
1 INJECTION, SOLUTION INTRAVENOUS EVERY 6 HOURS PRN
Status: DISCONTINUED | OUTPATIENT
Start: 2025-05-30 | End: 2025-06-01

## 2025-05-30 RX ORDER — MAGNESIUM SULFATE HEPTAHYDRATE 40 MG/ML
4 INJECTION, SOLUTION INTRAVENOUS EVERY 6 HOURS PRN
Status: DISCONTINUED | OUTPATIENT
Start: 2025-05-30 | End: 2025-06-01

## 2025-05-30 RX ORDER — ONDANSETRON HYDROCHLORIDE 2 MG/ML
4 INJECTION, SOLUTION INTRAVENOUS EVERY 8 HOURS PRN
Status: DISCONTINUED | OUTPATIENT
Start: 2025-05-30 | End: 2025-05-31

## 2025-05-30 RX ORDER — POTASSIUM CHLORIDE 1.5 G/1.58G
40 POWDER, FOR SOLUTION ORAL EVERY 6 HOURS PRN
Status: DISCONTINUED | OUTPATIENT
Start: 2025-05-30 | End: 2025-06-01

## 2025-05-30 RX ORDER — ATORVASTATIN CALCIUM 80 MG/1
80 TABLET, FILM COATED ORAL NIGHTLY
Status: DISCONTINUED | OUTPATIENT
Start: 2025-05-31 | End: 2025-06-05 | Stop reason: HOSPADM

## 2025-05-30 RX ORDER — EPINEPHRINE IN 0.9 % SOD CHLOR 4MG/250ML
0-2 PLASTIC BAG, INJECTION (ML) INTRAVENOUS CONTINUOUS
Status: DISCONTINUED | OUTPATIENT
Start: 2025-05-30 | End: 2025-05-30

## 2025-05-30 RX ORDER — HYDROMORPHONE HYDROCHLORIDE 0.2 MG/ML
0.2 INJECTION INTRAMUSCULAR; INTRAVENOUS; SUBCUTANEOUS
Status: DISCONTINUED | OUTPATIENT
Start: 2025-05-30 | End: 2025-05-30

## 2025-05-30 RX ORDER — PROPOFOL 10 MG/ML
0-50 INJECTION, EMULSION INTRAVENOUS CONTINUOUS
Status: DISCONTINUED | OUTPATIENT
Start: 2025-05-30 | End: 2025-05-30

## 2025-05-30 RX ORDER — NITROGLYCERIN 40 MG/100ML
INJECTION INTRAVENOUS AS NEEDED
Status: DISCONTINUED | OUTPATIENT
Start: 2025-05-30 | End: 2025-05-30

## 2025-05-30 RX ORDER — POLYETHYLENE GLYCOL 3350 17 G/17G
17 POWDER, FOR SOLUTION ORAL 2 TIMES DAILY
Status: DISCONTINUED | OUTPATIENT
Start: 2025-05-30 | End: 2025-06-05 | Stop reason: HOSPADM

## 2025-05-30 RX ORDER — POTASSIUM CHLORIDE 20 MEQ/1
40 TABLET, EXTENDED RELEASE ORAL EVERY 6 HOURS PRN
Status: DISCONTINUED | OUTPATIENT
Start: 2025-05-30 | End: 2025-06-01

## 2025-05-30 RX ORDER — METHADONE IN SOD CHLOR,ISO-OSM 10 MG/ML
SYRINGE (ML) INTRAVENOUS AS NEEDED
Status: DISCONTINUED | OUTPATIENT
Start: 2025-05-30 | End: 2025-05-30

## 2025-05-30 RX ORDER — CEFAZOLIN 1 G/1
INJECTION, POWDER, FOR SOLUTION INTRAVENOUS AS NEEDED
Status: DISCONTINUED | OUTPATIENT
Start: 2025-05-30 | End: 2025-05-30

## 2025-05-30 RX ORDER — LIDOCAINE HYDROCHLORIDE 20 MG/ML
INJECTION, SOLUTION INFILTRATION; PERINEURAL AS NEEDED
Status: DISCONTINUED | OUTPATIENT
Start: 2025-05-30 | End: 2025-05-30

## 2025-05-30 RX ORDER — PROPOFOL 10 MG/ML
INJECTION, EMULSION INTRAVENOUS AS NEEDED
Status: DISCONTINUED | OUTPATIENT
Start: 2025-05-30 | End: 2025-05-30

## 2025-05-30 RX ORDER — MAGNESIUM SULFATE HEPTAHYDRATE 40 MG/ML
2 INJECTION, SOLUTION INTRAVENOUS EVERY 6 HOURS PRN
Status: DISCONTINUED | OUTPATIENT
Start: 2025-05-30 | End: 2025-06-01

## 2025-05-30 RX ORDER — HEPARIN SODIUM 5000 [USP'U]/ML
5000 INJECTION, SOLUTION INTRAVENOUS; SUBCUTANEOUS EVERY 8 HOURS
Status: DISCONTINUED | OUTPATIENT
Start: 2025-05-31 | End: 2025-06-04

## 2025-05-30 RX ORDER — OXYCODONE HYDROCHLORIDE 10 MG/1
10 TABLET ORAL EVERY 4 HOURS PRN
Status: DISCONTINUED | OUTPATIENT
Start: 2025-05-30 | End: 2025-06-04

## 2025-05-30 RX ORDER — MIDAZOLAM HYDROCHLORIDE 1 MG/ML
INJECTION INTRAMUSCULAR; INTRAVENOUS AS NEEDED
Status: DISCONTINUED | OUTPATIENT
Start: 2025-05-30 | End: 2025-05-30

## 2025-05-30 RX ORDER — LIDOCAINE HYDROCHLORIDE 10 MG/ML
INJECTION, SOLUTION INFILTRATION; PERINEURAL AS NEEDED
Status: DISCONTINUED | OUTPATIENT
Start: 2025-05-30 | End: 2025-05-30

## 2025-05-30 RX ORDER — DEXTROSE 50 % IN WATER (D50W) INTRAVENOUS SYRINGE
25
Status: DISCONTINUED | OUTPATIENT
Start: 2025-05-30 | End: 2025-06-05 | Stop reason: HOSPADM

## 2025-05-30 RX ORDER — CALCIUM CHLORIDE INJECTION 100 MG/ML
INJECTION, SOLUTION INTRAVENOUS AS NEEDED
Status: DISCONTINUED | OUTPATIENT
Start: 2025-05-30 | End: 2025-05-30

## 2025-05-30 RX ORDER — HYDROMORPHONE HYDROCHLORIDE 0.2 MG/ML
0.2 INJECTION INTRAMUSCULAR; INTRAVENOUS; SUBCUTANEOUS
Status: DISCONTINUED | OUTPATIENT
Start: 2025-05-30 | End: 2025-05-31

## 2025-05-30 RX ORDER — PROTAMINE SULFATE 10 MG/ML
INJECTION, SOLUTION INTRAVENOUS AS NEEDED
Status: DISCONTINUED | OUTPATIENT
Start: 2025-05-30 | End: 2025-05-30

## 2025-05-30 RX ORDER — NAPROXEN SODIUM 220 MG/1
81 TABLET, FILM COATED ORAL DAILY
Status: DISCONTINUED | OUTPATIENT
Start: 2025-05-30 | End: 2025-06-05 | Stop reason: HOSPADM

## 2025-05-30 RX ORDER — PHENYLEPHRINE HYDROCHLORIDE 10 MG/ML
INJECTION INTRAVENOUS AS NEEDED
Status: DISCONTINUED | OUTPATIENT
Start: 2025-05-30 | End: 2025-05-30

## 2025-05-30 RX ORDER — ONDANSETRON 4 MG/1
4 TABLET, FILM COATED ORAL EVERY 8 HOURS PRN
Status: DISCONTINUED | OUTPATIENT
Start: 2025-05-30 | End: 2025-05-31

## 2025-05-30 RX ADMIN — HEPARIN SODIUM 5000 UNITS: 1000 INJECTION, SOLUTION INTRAVENOUS; SUBCUTANEOUS at 10:52

## 2025-05-30 RX ADMIN — MIDAZOLAM HYDROCHLORIDE 1 MG: 2 INJECTION, SOLUTION INTRAMUSCULAR; INTRAVENOUS at 07:20

## 2025-05-30 RX ADMIN — PHENYLEPHRINE HYDROCHLORIDE 300 MCG: 10 INJECTION INTRAVENOUS at 11:39

## 2025-05-30 RX ADMIN — PHENYLEPHRINE HYDROCHLORIDE 200 MCG: 10 INJECTION INTRAVENOUS at 10:56

## 2025-05-30 RX ADMIN — Medication 10 MG: at 10:00

## 2025-05-30 RX ADMIN — PHENYLEPHRINE HYDROCHLORIDE 100 MCG: 10 INJECTION INTRAVENOUS at 10:38

## 2025-05-30 RX ADMIN — HEPARIN SODIUM 885 ML: 1000 INJECTION INTRAVENOUS; SUBCUTANEOUS at 09:50

## 2025-05-30 RX ADMIN — OXYCODONE HYDROCHLORIDE 10 MG: 10 TABLET ORAL at 16:27

## 2025-05-30 RX ADMIN — HEPARIN SODIUM 5000 UNITS: 1000 INJECTION, SOLUTION INTRAVENOUS; SUBCUTANEOUS at 11:36

## 2025-05-30 RX ADMIN — FENTANYL CITRATE 350 MCG: 50 INJECTION, SOLUTION INTRAMUSCULAR; INTRAVENOUS at 08:29

## 2025-05-30 RX ADMIN — PHENYLEPHRINE HYDROCHLORIDE 100 MCG: 10 INJECTION INTRAVENOUS at 09:57

## 2025-05-30 RX ADMIN — PHENYLEPHRINE HYDROCHLORIDE 100 MCG: 10 INJECTION INTRAVENOUS at 11:26

## 2025-05-30 RX ADMIN — NITROGLYCERIN 10 MCG/MIN: 20 INJECTION INTRAVENOUS at 08:44

## 2025-05-30 RX ADMIN — LIDOCAINE HYDROCHLORIDE 100 MG: 10 INJECTION, SOLUTION INFILTRATION; PERINEURAL at 12:01

## 2025-05-30 RX ADMIN — PHENYLEPHRINE HYDROCHLORIDE 200 MCG: 10 INJECTION INTRAVENOUS at 10:57

## 2025-05-30 RX ADMIN — PROTAMINE SULFATE 50 MG: 10 INJECTION, SOLUTION INTRAVENOUS at 13:38

## 2025-05-30 RX ADMIN — PHENYLEPHRINE HYDROCHLORIDE 100 MCG: 10 INJECTION INTRAVENOUS at 11:25

## 2025-05-30 RX ADMIN — ACETAMINOPHEN 650 MG: 325 TABLET ORAL at 21:08

## 2025-05-30 RX ADMIN — EPINEPHRINE IN SODIUM CHLORIDE 0.03 MCG/KG/MIN: 16 INJECTION INTRAVENOUS at 11:59

## 2025-05-30 RX ADMIN — MAGNESIUM SULFATE HEPTAHYDRATE 2 G: 500 INJECTION, SOLUTION INTRAMUSCULAR; INTRAVENOUS at 11:59

## 2025-05-30 RX ADMIN — Medication 5 MG: at 08:14

## 2025-05-30 RX ADMIN — PHENYLEPHRINE HYDROCHLORIDE 100 MCG: 10 INJECTION INTRAVENOUS at 11:19

## 2025-05-30 RX ADMIN — ACETAMINOPHEN 650 MG: 325 TABLET ORAL at 15:29

## 2025-05-30 RX ADMIN — PROPOFOL 150 MG: 10 INJECTION, EMULSION INTRAVENOUS at 07:40

## 2025-05-30 RX ADMIN — PHENYLEPHRINE HYDROCHLORIDE 200 MCG: 10 INJECTION INTRAVENOUS at 11:17

## 2025-05-30 RX ADMIN — PHENYLEPHRINE HYDROCHLORIDE 300 MCG: 10 INJECTION INTRAVENOUS at 10:03

## 2025-05-30 RX ADMIN — GLYCOPYRROLATE 1 MG: 0.2 INJECTION, SOLUTION INTRAMUSCULAR; INTRAVENOUS at 15:30

## 2025-05-30 RX ADMIN — SODIUM CHLORIDE, SODIUM GLUCONATE, SODIUM ACETATE, POTASSIUM CHLORIDE AND MAGNESIUM CHLORIDE: 30; 37; 368; 526; 502 INJECTION, SOLUTION INTRAVENOUS at 07:40

## 2025-05-30 RX ADMIN — SODIUM CHLORIDE, SODIUM LACTATE, POTASSIUM CHLORIDE, AND CALCIUM CHLORIDE 500 ML: 600; 310; 30; 20 INJECTION, SOLUTION INTRAVENOUS at 16:44

## 2025-05-30 RX ADMIN — INSULIN HUMAN 4 UNITS/HR: 1 INJECTION, SOLUTION INTRAVENOUS at 14:38

## 2025-05-30 RX ADMIN — NITROGLYCERIN 25 MCG/MIN: 20 INJECTION INTRAVENOUS at 17:04

## 2025-05-30 RX ADMIN — PHENYLEPHRINE HYDROCHLORIDE 300 MCG: 10 INJECTION INTRAVENOUS at 10:18

## 2025-05-30 RX ADMIN — PHENYLEPHRINE HYDROCHLORIDE 300 MCG: 10 INJECTION INTRAVENOUS at 11:52

## 2025-05-30 RX ADMIN — PHENYLEPHRINE HYDROCHLORIDE 100 MCG: 10 INJECTION INTRAVENOUS at 10:39

## 2025-05-30 RX ADMIN — FENTANYL CITRATE 400 MCG: 50 INJECTION, SOLUTION INTRAMUSCULAR; INTRAVENOUS at 08:09

## 2025-05-30 RX ADMIN — PHENYLEPHRINE HYDROCHLORIDE 100 MCG: 10 INJECTION INTRAVENOUS at 10:17

## 2025-05-30 RX ADMIN — CEFAZOLIN SODIUM 2 G: 2 INJECTION, SOLUTION INTRAVENOUS at 22:02

## 2025-05-30 RX ADMIN — SODIUM CHLORIDE, SODIUM GLUCONATE, SODIUM ACETATE, POTASSIUM CHLORIDE AND MAGNESIUM CHLORIDE: 526; 502; 368; 37; 30 INJECTION, SOLUTION INTRAVENOUS at 07:40

## 2025-05-30 RX ADMIN — PHENYLEPHRINE HYDROCHLORIDE 100 MCG: 10 INJECTION INTRAVENOUS at 11:03

## 2025-05-30 RX ADMIN — PROPOFOL 30 MCG/KG/MIN: 10 INJECTION, EMULSION INTRAVENOUS at 14:25

## 2025-05-30 RX ADMIN — CEFAZOLIN 2 G: 1 INJECTION, POWDER, FOR SOLUTION INTRAMUSCULAR; INTRAVENOUS at 12:04

## 2025-05-30 RX ADMIN — EPINEPHRINE IN SODIUM CHLORIDE 0.04 MCG/KG/MIN: 16 INJECTION INTRAVENOUS at 14:25

## 2025-05-30 RX ADMIN — NITROGLYCERIN 100 MCG: 10 INJECTION INTRAVENOUS at 08:35

## 2025-05-30 RX ADMIN — SODIUM CHLORIDE, POTASSIUM CHLORIDE, SODIUM LACTATE AND CALCIUM CHLORIDE 500 ML: 600; 310; 30; 20 INJECTION, SOLUTION INTRAVENOUS at 17:39

## 2025-05-30 RX ADMIN — SODIUM CHLORIDE 2 UNITS/HR: 9 INJECTION, SOLUTION INTRAVENOUS at 08:20

## 2025-05-30 RX ADMIN — ROCURONIUM BROMIDE 100 MG: 10 INJECTION, SOLUTION INTRAVENOUS at 07:41

## 2025-05-30 RX ADMIN — SODIUM CHLORIDE, POTASSIUM CHLORIDE, SODIUM LACTATE AND CALCIUM CHLORIDE 500 ML: 600; 310; 30; 20 INJECTION, SOLUTION INTRAVENOUS at 15:24

## 2025-05-30 RX ADMIN — PHENYLEPHRINE HYDROCHLORIDE 100 MCG: 10 INJECTION INTRAVENOUS at 10:30

## 2025-05-30 RX ADMIN — GLYCOPYRROLATE 0.2 MG: 0.2 INJECTION INTRAMUSCULAR; INTRAVENOUS at 13:14

## 2025-05-30 RX ADMIN — ALBUMIN HUMAN 250 ML: 0.05 INJECTION, SOLUTION INTRAVENOUS at 13:10

## 2025-05-30 RX ADMIN — INSULIN HUMAN 2 UNITS: 100 INJECTION, SOLUTION PARENTERAL at 08:20

## 2025-05-30 RX ADMIN — Medication 10 MG: at 11:00

## 2025-05-30 RX ADMIN — ROCURONIUM BROMIDE 50 MG: 10 INJECTION, SOLUTION INTRAVENOUS at 09:25

## 2025-05-30 RX ADMIN — FENTANYL CITRATE 150 MCG: 50 INJECTION, SOLUTION INTRAMUSCULAR; INTRAVENOUS at 08:24

## 2025-05-30 RX ADMIN — Medication 30 MG: at 08:16

## 2025-05-30 RX ADMIN — SENNOSIDES AND DOCUSATE SODIUM 2 TABLET: 50; 8.6 TABLET ORAL at 15:29

## 2025-05-30 RX ADMIN — CALCIUM CHLORIDE 1 G: 100 INJECTION, SOLUTION INTRAVENOUS at 12:02

## 2025-05-30 RX ADMIN — PHENYLEPHRINE HYDROCHLORIDE 100 MCG: 10 INJECTION INTRAVENOUS at 10:16

## 2025-05-30 RX ADMIN — Medication 5 MG: at 12:18

## 2025-05-30 RX ADMIN — POLYETHYLENE GLYCOL 3350 17 G: 17 POWDER, FOR SOLUTION ORAL at 15:29

## 2025-05-30 RX ADMIN — ROCURONIUM BROMIDE 40 MG: 10 INJECTION, SOLUTION INTRAVENOUS at 11:00

## 2025-05-30 RX ADMIN — PHENYLEPHRINE HYDROCHLORIDE 300 MCG: 10 INJECTION INTRAVENOUS at 10:13

## 2025-05-30 RX ADMIN — LIDOCAINE HYDROCHLORIDE 100 MG: 20 INJECTION, SOLUTION INFILTRATION; PERINEURAL at 07:40

## 2025-05-30 RX ADMIN — PHENYLEPHRINE HYDROCHLORIDE 100 MCG: 10 INJECTION INTRAVENOUS at 10:11

## 2025-05-30 RX ADMIN — PHENYLEPHRINE HYDROCHLORIDE 300 MCG: 10 INJECTION INTRAVENOUS at 11:51

## 2025-05-30 RX ADMIN — PHENYLEPHRINE HYDROCHLORIDE 300 MCG: 10 INJECTION INTRAVENOUS at 10:19

## 2025-05-30 RX ADMIN — PROTAMINE SULFATE 400 MG: 10 INJECTION, SOLUTION INTRAVENOUS at 12:25

## 2025-05-30 RX ADMIN — FENTANYL CITRATE 100 MCG: 50 INJECTION, SOLUTION INTRAMUSCULAR; INTRAVENOUS at 07:40

## 2025-05-30 RX ADMIN — NITROGLYCERIN 100 MCG: 10 INJECTION INTRAVENOUS at 08:42

## 2025-05-30 RX ADMIN — PHENYLEPHRINE HYDROCHLORIDE 100 MCG: 10 INJECTION INTRAVENOUS at 11:36

## 2025-05-30 RX ADMIN — SODIUM BICARBONATE 50 MEQ: 84 INJECTION, SOLUTION INTRAVENOUS at 11:27

## 2025-05-30 RX ADMIN — PHENYLEPHRINE HYDROCHLORIDE 200 MCG: 10 INJECTION INTRAVENOUS at 11:37

## 2025-05-30 RX ADMIN — ASPIRIN 81 MG: 81 TABLET, CHEWABLE ORAL at 15:29

## 2025-05-30 RX ADMIN — HEPARIN SODIUM 5000 UNITS: 1000 INJECTION, SOLUTION INTRAVENOUS; SUBCUTANEOUS at 10:14

## 2025-05-30 RX ADMIN — ONDANSETRON 4 MG: 2 INJECTION INTRAMUSCULAR; INTRAVENOUS at 20:02

## 2025-05-30 RX ADMIN — NEOSTIGMINE METHYLSULFATE 5 MG: 1 INJECTION INTRAVENOUS at 15:29

## 2025-05-30 RX ADMIN — PROPOFOL 20 MCG/KG/MIN: 10 INJECTION, EMULSION INTRAVENOUS at 12:53

## 2025-05-30 RX ADMIN — INSULIN HUMAN 2 UNITS: 100 INJECTION, SOLUTION PARENTERAL at 10:11

## 2025-05-30 RX ADMIN — PHENYLEPHRINE HYDROCHLORIDE 100 MCG: 10 INJECTION INTRAVENOUS at 10:41

## 2025-05-30 RX ADMIN — Medication 0.02 MCG/KG/MIN: at 12:04

## 2025-05-30 RX ADMIN — PHENYLEPHRINE HYDROCHLORIDE 200 MCG: 10 INJECTION INTRAVENOUS at 10:58

## 2025-05-30 RX ADMIN — CEFAZOLIN SODIUM 2 G: 2 INJECTION, SOLUTION INTRAVENOUS at 15:29

## 2025-05-30 RX ADMIN — PHENYLEPHRINE HYDROCHLORIDE 200 MCG: 10 INJECTION INTRAVENOUS at 11:15

## 2025-05-30 RX ADMIN — PHENYLEPHRINE HYDROCHLORIDE 300 MCG: 10 INJECTION INTRAVENOUS at 10:04

## 2025-05-30 RX ADMIN — PROTAMINE SULFATE 25 MG: 10 INJECTION, SOLUTION INTRAVENOUS at 12:52

## 2025-05-30 RX ADMIN — ROCURONIUM BROMIDE 10 MG: 10 INJECTION, SOLUTION INTRAVENOUS at 12:20

## 2025-05-30 RX ADMIN — PHENYLEPHRINE HYDROCHLORIDE 100 MCG: 10 INJECTION INTRAVENOUS at 11:11

## 2025-05-30 RX ADMIN — TRANEXAMIC ACID 1500 MG: 100 INJECTION INTRAVENOUS at 08:10

## 2025-05-30 RX ADMIN — PHENYLEPHRINE HYDROCHLORIDE 300 MCG: 10 INJECTION INTRAVENOUS at 10:05

## 2025-05-30 RX ADMIN — Medication 5 MG: at 07:45

## 2025-05-30 RX ADMIN — PHENYLEPHRINE HYDROCHLORIDE 200 MCG: 10 INJECTION INTRAVENOUS at 10:55

## 2025-05-30 RX ADMIN — HEPARIN SODIUM 5000 UNITS: 1000 INJECTION, SOLUTION INTRAVENOUS; SUBCUTANEOUS at 11:56

## 2025-05-30 RX ADMIN — Medication 160 MCG: at 09:26

## 2025-05-30 RX ADMIN — Medication 5 MG: at 08:45

## 2025-05-30 RX ADMIN — PHENYLEPHRINE HYDROCHLORIDE 200 MCG: 10 INJECTION INTRAVENOUS at 10:01

## 2025-05-30 RX ADMIN — CEFAZOLIN 2 G: 1 INJECTION, POWDER, FOR SOLUTION INTRAMUSCULAR; INTRAVENOUS at 08:15

## 2025-05-30 RX ADMIN — PHENYLEPHRINE HYDROCHLORIDE 300 MCG: 10 INJECTION INTRAVENOUS at 10:02

## 2025-05-30 RX ADMIN — PHENYLEPHRINE HYDROCHLORIDE 300 MCG: 10 INJECTION INTRAVENOUS at 11:38

## 2025-05-30 RX ADMIN — PHENYLEPHRINE HYDROCHLORIDE 100 MCG: 10 INJECTION INTRAVENOUS at 09:58

## 2025-05-30 RX ADMIN — NOREPINEPHRINE BITARTRATE 0.4 MCG/KG/MIN: 8 INJECTION, SOLUTION INTRAVENOUS at 14:24

## 2025-05-30 RX ADMIN — HYDROMORPHONE HYDROCHLORIDE 0.2 MG: 0.2 INJECTION, SOLUTION INTRAMUSCULAR; INTRAVENOUS; SUBCUTANEOUS at 17:06

## 2025-05-30 RX ADMIN — PHENYLEPHRINE HYDROCHLORIDE 100 MCG: 10 INJECTION INTRAVENOUS at 10:42

## 2025-05-30 RX ADMIN — PHENYLEPHRINE HYDROCHLORIDE 100 MCG: 10 INJECTION INTRAVENOUS at 10:00

## 2025-05-30 RX ADMIN — PHENYLEPHRINE HYDROCHLORIDE 300 MCG: 10 INJECTION INTRAVENOUS at 11:16

## 2025-05-30 RX ADMIN — INSULIN HUMAN 8 UNITS/HR: 1 INJECTION, SOLUTION INTRAVENOUS at 22:47

## 2025-05-30 RX ADMIN — PHENYLEPHRINE HYDROCHLORIDE 200 MCG: 10 INJECTION INTRAVENOUS at 10:12

## 2025-05-30 RX ADMIN — GLYCOPYRROLATE 0.2 MG: 0.2 INJECTION INTRAMUSCULAR; INTRAVENOUS at 13:09

## 2025-05-30 RX ADMIN — ALBUMIN HUMAN 250 ML: 0.05 INJECTION, SOLUTION INTRAVENOUS at 13:20

## 2025-05-30 RX ADMIN — HEPARIN SODIUM 40000 UNITS: 1000 INJECTION, SOLUTION INTRAVENOUS; SUBCUTANEOUS at 09:19

## 2025-05-30 SDOH — HEALTH STABILITY: MENTAL HEALTH: CURRENT SMOKER: 0

## 2025-05-30 ASSESSMENT — PAIN SCALES - GENERAL
PAINLEVEL_OUTOF10: 0 - NO PAIN
PAINLEVEL_OUTOF10: 3
PAINLEVEL_OUTOF10: 7
PAINLEVEL_OUTOF10: 0 - NO PAIN
PAINLEVEL_OUTOF10: 0 - NO PAIN
PAINLEVEL_OUTOF10: 10 - WORST POSSIBLE PAIN

## 2025-05-30 ASSESSMENT — PAIN DESCRIPTION - LOCATION: LOCATION: CHEST

## 2025-05-30 ASSESSMENT — PAIN - FUNCTIONAL ASSESSMENT
PAIN_FUNCTIONAL_ASSESSMENT: 0-10

## 2025-05-30 ASSESSMENT — PAIN DESCRIPTION - ORIENTATION: ORIENTATION: ANTERIOR

## 2025-05-30 NOTE — SIGNIFICANT EVENT
05/30/25 1511   Wound 05/30/25 Buttock Left   Date First Assessed/Time First Assessed: 05/30/25 1511   Location: Buttock  Wound Location Orientation: Left   Present on Admission to Healthcare Facility [facility]   Wound Image Images linked   Wound Length (cm) 9 cm   Wound Width (cm) 6 cm   Wound Surface Area (cm^2) 42.41 cm^2   Wound Depth (cm) 0 cm   Wound Volume (cm^3) 0 cm^3   State of Healing Closed wound edges   Margins Attached edges   Drainage Description Red   Drainage Amount None   Dressing Foam   Dressing Changed New   Dressing Status Clean;Dry;Occlusive

## 2025-05-30 NOTE — ANESTHESIA PROCEDURE NOTES
Peripheral Block    Patient location during procedure: pre-op  Medication administered at: 5/30/2025 6:15 AM  End time: 5/30/2025 6:29 AM  Reason for block: at surgeon's request and post-op pain management  Staffing  Performed: attending and resident   Authorized by: Herbert Shook MD    Performed by: Verena Haq DO  Preanesthetic Checklist  Completed: patient identified, IV checked, site marked, risks and benefits discussed, surgical consent, monitors and equipment checked, pre-op evaluation and timeout performed   Timeout performed at: 5/30/2025 6:15 AM  Peripheral Block  Patient position: laying flat  Prep: ChloraPrep  Patient monitoring: heart rate, continuous pulse ox and cardiac monitor  Block type: serratus anterior  Injection technique: single-shot  Guidance: ultrasound guided  Local infiltration: lidocaine  Infiltration strength: 1 %  Dose: 3 mL  Needle  Needle type: short-bevel   Needle gauge: 22 G  Needle length: 8 cm  Needle localization: ultrasound guidance     image stored in chart  Assessment  Injection assessment: negative aspiration for heme, no paresthesia on injection, incremental injection and local visualized surrounding nerve on ultrasound  Heart rate change: no  Slow fractionated injection: yes  Additional Notes  Serratus anterior plane nerve block:     Prior to procedure: Following a focused history, procedure-related and patient-specific complications were discussed. Risks, benefits, and alternatives were explained. Informed, written consent was provided by the patient and/or surrogate decision maker for the block. Anticoagulation (if any) was held per KALLIE guidelines. ASA monitors were applied. Patient was positioned, prepped with chlorhexidine, and draped with sterile towels.     Ultrasound guidance was used to visualize the needle throughout duration of the procedure.  Aspiration was negative. A total of 60 cc of 0.5% ropivacaine, dexamethasone 4mg, and 1:200,000 epinephrine,  was divided and injected bilaterally. Patient tolerated procedure well.    Timeout by Edie

## 2025-05-30 NOTE — BRIEF OP NOTE
Median Sternotomy  CABG x 3: LIMA-LAD/ SVG-OM/SVG-PLV  AVR with 23mm Inspirus Resilia Bioprosthetic Aortic Valve  MAZE with Synergy Encompass  LAAC with AtriCure AtriClip 45mm  Posterior Pericardiotomy  Right Leg EVH  Sternal Closure with Wires and SternaLock XP Rigid Fixation System    Chest Tubes/Drains: Peter: Posterior Pericardial/ Chest Tube: Mediastinal       Temporary Pacing Wires: V-Wire placed    -Settings:   -Underlying Rhythm: Sinus Bradycardia    Permanent pacer/ICD: No   -Preoperative settings:    -Intra-op/ Postoperative settings:    Sternotomy performed by: Tracy    Conduit Harvested by: Erick    Sternal Wires placed by: Erick    Arm/Leg/Groin Closure/Cutdown performed by: n/a    Cardio Pulmonary Bypass Time: 134 min.  Cross-clamp Time: 120 min.  Circulatory Arrest: No Time:     Is patient candidate for Emergency Re-sternotomy? Yes   -If yes, POD #10 is - Median SternotomyDate: 2025  OR Location: [facility] OR    Name: Franky Sauceda, : 1950, Age: 74 y.o., MRN: [id number], Sex: male    Diagnosis  Pre-op Diagnosis      * Severe aortic stenosis [I35.0]     * Coronary artery disease involving native coronary artery of native heart without angina pectoris [I25.10] Post-op Diagnosis     * Severe aortic stenosis [I35.0]     * Coronary artery disease involving native coronary artery of native heart without angina pectoris [I25.10]     Procedures  Cornonary artery bypass grafting x 3 LIMA-LAD, SVG-OM, SVG-PLV, right endovascular SVG harvest, Aortic valve replacement with 23 mm Inspiris, MAZE, Left atrial appendage clip with 45 mm Atriclip; posterior pericardial window  84009 - ND CORONARY ARTERY BYP W/VEIN & ARTERY GRAFT 3 VEIN      Surgeons      * Zaida Molina - Primary    Resident/Fellow/Other Assistant:  Surgeons and Role:  * No surgeons found with a matching role *  Erick TAYLOR/ Lizzy COWAN/ Muna GRIMES  Staff:   Circulator: Jenn  Scrub Person: Lyly  Surgical Assistant:  Roger  Surgical Assistant: Chencho Leon Scrub: Nora Leon Circulator: Nuzhat  Surgical Assistant: Claudia    Anesthesia Staff: Anesthesiologist: Nelida Ramirez MD; Jay Peterson MD  CRNA: DAYANARA Weeks  Perfusionist: Noe Guzman    Procedure Summary  Anesthesia: Regional, General  ASA: IV  Estimated Blood Loss: 250mL  Intra-op Medications:   Administrations occurring from 0645 to 1615 on 05/30/25:   Medication Name Total Dose   papaverine injection 180 mg   heparin 1,000 unit/mL injection 10,000 Units   calcium chloride 100 mg/mL syringe 1 g   ceFAZolin (Ancef) 1 g 4 g   electrolyte-A (Plasmalyte-A) Cannot be calculated   electrolyte-A (Plasmalyte-A) Cannot be calculated   electrolyte-A (Plasmalyte-A) Cannot be calculated   EPINEPHrine (Adrenalin) 4 mg/ 250 mL NS (16 mcg/mL) infusion 0.8 mg   fentaNYL (Sublimaze) injection 50 mcg/mL 1,000 mcg   heparin 1,000 units/mL 60,000 Units   insulin regular (HumuLIN R,NovoLIN R) 100 Units in sodium chloride 0.9% 100 mL (1 Units/mL) infusion 28.33 Units   insulin regular (HumuLIN R,NovoLIN R) injection 4 Units   ketamine injection 50 mg/ 5 mL (10 mg/mL) 50 mg   lidocaine (Xylocaine) injection 1 % 100 mg   lidocaine (Xylocaine) injection 2 % 100 mg   magnesium sulfate 50 % injection 2 g   methadone (Dolophine) injection 20 mg   midazolam PF (Versed) injection 1 mg/mL 1 mg   nitroglycerin (Tridil) 50 mg/250 mL D5W (0.2 mg/mL) infusion 80 mcg   nitroglycerin 1 mg in 10 mL D5W IV bolus 200 mcg   norepinephrine (Levophed) 8 mg / 250 mL NS  (premix) 1.05 mg   perfusion prime builder 885 mL   phenylephrine (Curtis-Synephrine) injection 7,100 mcg   phenylephrine 40 mcg/mL syringe 10 mL 160 mcg   propofol (Diprivan) injection 10 mg/mL 572.58 mg   protamine 10 mg/mL 25 mg   rocuronium (ZeMuron) 50 mg/5 mL injection 200 mg   sodium bicarbonate injection 1 mEq/mL 50 mEq   tranexamic acid (Cyklokapron) 5,000 mg in sodium chloride 0.9% 250 mL (20 mg/mL)  infusion 3,921.49 mg              Anesthesia Record               Intraprocedure I/O Totals          Intake    Norepinephrine Drip 0.00 mL    The total shown is the total volume documented since Anesthesia Start was filed.    Tranexamic Acid 0.00 mL    The total shown is the total volume documented since Anesthesia Start was filed.    Insulin Drip 0.00 mL    The total shown is the total volume documented since Anesthesia Start was filed.    Nitroglycerin Drip 0.00 mL    The total shown is the total volume documented since Anesthesia Start was filed.    Epinephrine Drip 0.00 mL    The total shown is the total volume documented since Anesthesia Start was filed.    perfusion prime builder 885.00 mL    Cell Saver 492 mL    Total Intake 1377 mL       Output    Urine 450 mL    Total Output 450 mL       Net    Net Volume 927 mL          Specimen:   ID Type Source Tests Collected by Time   1 : AORTIC VALVE Tissue AORTIC VALVE SURGICAL PATHOLOGY EXAM Zaida Molina MD 5/30/2025 0873                  Findings: Aortic Stenosis/ CAD    Complications:  None; patient tolerated the procedure well.     Disposition: ICU - intubated and hemodynamically stable.  Condition: stable  Specimens Collected:   ID Type Source Tests Collected by Time   1 : AORTIC VALVE Tissue AORTIC VALVE SURGICAL PATHOLOGY EXAM Zaida Molina MD 5/30/2025 1116     Attending Attestation: I was present and scrubbed for the key portions of the procedure.    Zaida Molina  Phone Number: [phone]

## 2025-05-30 NOTE — OP NOTE
Coronary Artery Bypass Grafting x3 (LIMA to LAD, SVG to PLV, SVG to OM);   Bioprosthetic Aortic Valve Replacement (23mm Inspiris);   Left Atrial Maze Procedure; Left Atrial Appendage Clip (45mm AtriClip);   Posterior Pericardial Window   Operative Note     Date: 2025  OR Location: [facility] OR    Name: Franky Sauceda, : 1950, Age: 74 y.o., MRN: [id number], Sex: Male    Diagnosis  Pre-op Diagnosis      * Symptomatic severe aortic stenosis     * Multivessel coronary artery disease      * Paroxysmal atrial fibrillation     * Atypical atrial flutter Post-op Diagnosis     * Symptomatic severe aortic stenosis      * Multivessel coronary artery disease      * Paroxysmal atrial fibrillation     * Atypical atrial flutter      Procedures  Coronary artery bypass grafting x3              In situ left internal mammary artery to left anterior descending coronary artery bypass               Greater saphenous vein to posterior left ventricular coronary artery bypass               Greater saphenous vein to obtuse marginal coronary artery bypass   Endoscopic harvest of right greater saphenous vein   Medistim flow probe analysis of bypass grafts   Bioprosthetic aortic valve replacement with 23mm Inspiris valve   Left atrial Maze procedure with EnCompass clamp bipolar radiofrequency ablation   Left atrial appendage clip with 45mm AtriClip  Posterior pericardial window  Sternal plating     Surgeons      * Zaida Molina MD - Primary    Resident/Fellow/Other Assistant:     * Chencho GRIMES - Assisting      * Roger COWAN - Assisting     Staff:   Circulator: Jenn  Scrub Person: Lyly  Surgical Assistant: Roger  Surgical Assistant: Chencho Leon Scrub: Nora Leon Circulator: Nuzhat  Surgical Assistant: Claudia    Anesthesia Staff: Anesthesiologist: Nelida Ramirez MD; Jay Peterson MD  CRNA: TONY Weeks-CRNA  Perfusionist: Noe Guzman    Procedure Summary  Anesthesia: Regional,  General  ASA: IV  Estimated Blood Loss: 250mL  Intra-op Medications:   Administrations occurring from 0645 to 1615 on 05/30/25:   Medication Name Total Dose   papaverine injection 180 mg   heparin 1,000 unit/mL injection 10,000 Units   albumin human bottle 5% 500 mL   calcium chloride 100 mg/mL syringe 1 g   ceFAZolin (Ancef) 1 g 4 g   electrolyte-A (Plasmalyte-A) Cannot be calculated   electrolyte-A (Plasmalyte-A) Cannot be calculated   electrolyte-A (Plasmalyte-A) Cannot be calculated   EPINEPHrine (Adrenalin) 4 mg/ 250 mL NS (16 mcg/mL) infusion 0.8 mg   fentaNYL (Sublimaze) injection 50 mcg/mL 1,000 mcg   glycopyrrolate (Robinul) injection 0.4 mg   heparin 1,000 units/mL 60,000 Units   insulin regular (HumuLIN R,NovoLIN R) 100 Units in sodium chloride 0.9% 100 mL (1 Units/mL) infusion 28.33 Units   insulin regular (HumuLIN R,NovoLIN R) injection 4 Units   ketamine injection 50 mg/ 5 mL (10 mg/mL) 50 mg   lidocaine (Xylocaine) injection 1 % 100 mg   lidocaine (Xylocaine) injection 2 % 100 mg   magnesium sulfate 50 % injection 2 g   methadone (Dolophine) injection 20 mg   midazolam PF (Versed) injection 1 mg/mL 1 mg   nitroglycerin (Tridil) 50 mg/250 mL D5W (0.2 mg/mL) infusion 80 mcg   nitroglycerin 1 mg in 10 mL D5W IV bolus 200 mcg   norepinephrine (Levophed) 8 mg / 250 mL NS  (premix) 1.05 mg   perfusion prime builder 885 mL   phenylephrine (Curtis-Synephrine) injection 7,100 mcg   phenylephrine 40 mcg/mL syringe 10 mL 160 mcg   propofol (Diprivan) injection 10 mg/mL 572.58 mg   protamine 10 mg/mL 25 mg   rocuronium (ZeMuron) 50 mg/5 mL injection 200 mg   sodium bicarbonate injection 1 mEq/mL 50 mEq   tranexamic acid (Cyklokapron) 5,000 mg in sodium chloride 0.9% 250 mL (20 mg/mL) infusion 2,990.55 mg              Anesthesia Record               Intraprocedure I/O Totals          Intake    Norepinephrine Drip 0.00 mL    The total shown is the total volume documented since Anesthesia Start was filed.    Tranexamic  Acid 0.00 mL    The total shown is the total volume documented since Anesthesia Start was filed.    Insulin Drip 0.00 mL    The total shown is the total volume documented since Anesthesia Start was filed.    Nitroglycerin Drip 0.00 mL    The total shown is the total volume documented since Anesthesia Start was filed.    Epinephrine Drip 0.00 mL    The total shown is the total volume documented since Anesthesia Start was filed.    perfusion prime builder 885.00 mL    Cell Saver 492 mL    Total Intake 1377 mL       Output    Urine 500 mL    Total Output 500 mL       Net    Net Volume 877 mL          Specimen:   ID Type Source Tests Collected by Time   1 : AORTIC VALVE Tissue AORTIC VALVE SURGICAL PATHOLOGY EXAM Zaida Molina MD 5/30/2025 1116      Drains and/or Catheters:   Urethral Catheter Temperature probe 14 Fr. (Active)     Implants:  Implants       Type Name Action Serial No.      Other Cardiac Implant ATRICLIP, FLEX-V DEVICE, 45MM STERILE, ACHV45 - [id number] Implanted      Other Cardiac Implant VALVE, AORTIC INSPIRIS RESILIA 23MM - [id number] - [id number] Implanted [id number]     Screw PLATE, X CABLE, XP RIGID FIXATION - [id number] Implanted      Screw PLATE KIT, AUXILIARYCABLE, SHARP NEEDLE, XP RIGID FIXATION - [id number] Implanted      Screw PLATE KIT, BOX CABLE, XP RIGID FIXATION - [id number] Implanted            Findings:  The left internal mammary artery was a good conduit, approximately 3mm in diameter with minimal calcification in the wall. The greater saphenous vein was good conduit, approximately 3mm in diameter.   The LAD was approximately 2mm with calcification in the walls. The middle third was grafted. The PLV and OM were approximately 2mm with calcification in the walls.   Normal biventricular function pre- and post-bypass.       Chest Tubes/Drains:   28 Congolese left pleural Peter drain   28 Congolese mediastinal chest tube        Temporary Pacing Wires: Ventricular    -Settings: VVI at  80   -Underlying Rhythm: Sinus bradycardia     Permanent pacer/ICD: No    Sternotomy performed by: Zaida Molina MD    Conduit Harvested by: Chencho GRIMES    Sternal Wires and Plates placed by: Chencho GRIMES    Leg Closure performed by: Chencho GRIMES    Cardio Pulmonary Bypass Time: 134 minutes  Cross-clamp Time: 120 minutes  Circulatory Arrest: No     Is patient candidate for Emergency Re-sternotomy? Yes   -If yes, POD #10 is - 6/9/25    Indications: Franky Sauceda is a 74-year-old male with a history of symptomatic severe aortic stenosis, multivessel coronary artery disease, paroxysmal atrial fibrillation, and atypical atrial flutter. He presents now for coronary artery bypass grafting.      The patient was seen in the preoperative area. The risks, benefits, complications, treatment options, non-operative alternatives, expected recovery and outcomes were discussed with the patient. The possibilities of reaction to medication, pulmonary aspiration, injury to surrounding structures, bleeding, recurrent infection, the need for additional procedures, failure to diagnose a condition, and creating a complication requiring transfusion or operation were discussed with the patient. The patient concurred with the proposed plan, giving informed consent. The site of surgery was properly noted per policy. Preoperative antibiotics were ordered and given within 1 hour of incision. Venous thrombosis prophylaxis was not indicated.      Procedure Details:  After informed consent and patient identification, the patient was brought to the operating room. He was positioned supine and an arterial monitoring line was placed. General anesthesia was induced and the patient was intubated. A CHAD probe was placed in the esophagus and central venous access was obtained. The surgical site was prepped and draped in sterile fashion.      A surgical time-out was performed verifying the correct patient, procedure, timing and  dosage of antibiotics, availability of blood, beta blocker administration, fire risk and instrument sterility prior to beginning the case.      CANNULATION AND INITIATION OF CARDIOPULMONARY BYPASS   A midline incision was made on the chest. Bovie electrocautery was used to dissect down to the anterior table of the sternum. A reciprocating saw was used to make a sternotomy. The left internal mammary artery was harvested in skeletonized fashion. The patient was heparinized and the left internal mammary artery was divided distally. Concomitantly, right greater saphenous vein was harvested using endoscopic technique. The pericardium was divided and reflected laterally to create a well. The patient was systemically heparinized to an ACT of > 400 seconds. The distal ascending aorta and right atrium were cannulated. An indirect retrograde cardioplegia catheter was placed into the coronary sinus and an antegrade cardioplegia catheter was placed in the ascending aorta. Cardiopulmonary bypass was initiated.     LEFT ATRIAL MAZE PROCEDURE  A left atrial Maze procedure was performed using EnCompass clamp bipolar radiofrequency ablation for creation of the left atrial box lesion.     The aorta was cross clamped and cardioplegia was initiated. A left ventricular vent was placed via the right superior pulmonary vein. The heart was arrested in diastole with cold cardioplegia. The heart received cardioplegia at 15 to 20 minute intervals in antegrade fashion down the antegrade cardioplegia catheter, conduit grafts, and coronary ostia as well as in retrograde fashion down the coronary sinus.      SURGICAL CORONARY GRAFTING   The posterior left ventricular coronary artery was isolated. An arteriotomy was created and elongated with Saul scissors. The greater saphenous vein was then anastomosed end to side using 7-0 Prolene suture in running fashion. This was tested and found to be hemostatic.     The obtuse marginal coronary artery was  isolated. An arteriotomy was created and elongated with Saul scissors. The greater saphenous vein was then anastomosed end to side using 7-0 Prolene suture in running fashion. This was tested and found to be hemostatic.     LEFT ATRIAL APPENDAGE CLIP   A 45mm AtriClip was applied across the base of the left atrial appendage.     SURGICAL CORONARY GRAFTING, CONTINUED   The left anterior descending artery was isolated. An arteriotomy was created and elongated with Saul scissors. The in situ left internal mammary artery was anastomosed to the left anterior descending coronary artery end to side using 7-0 Prolene suture in running fashion. This was tested and found to be hemostatic.     BIOPROSTHETIC AORTIC VALVE REPLACEMENT   A aortotomy was created and the aortic valve was exposed. A trileaflet, heavily calcified aortic valve was excised and the annulus was debrided. The aortic annulus was sized for a 23mm Inspiris valve. Pledgeted annular sutures were placed circumferentially and passed through the sewing ring of the valve. The valve was then seated and secured with Cor-Knots. The left and right coronary ostia were visible and patent above the valve prosthesis. The aortotomy was closed with 4-0 Prolene in running fashion.     SURGICAL CORONARY GRAFTING, CONTINUED   An aortotomy was created in the proximal ascending aorta and enlarged with a 4mm punch. The greater saphenous vein anastomosed distally to the obtuse marginal coronary artery was anastomosed end to side proximally with 6-0 Prolene suture in running fashion.     An aortotomy was created in the proximal ascending aorta and enlarged with a 4mm punch. The greater saphenous vein anastomosed distally to the posterior left ventricular coronary artery was anastomosed end to side proximally with 6-0 Prolene suture in running fashion.     Warm blood cardioplegia was given retrograde via the retrograde cardioplegia catheter. The patient was placed in Trendelenberg  position. The heart was deaired and the aortic cross clamp was removed with the root vent on.      POSTERIOR PERICARDIAL WINDOW CREATION  Bovie electrocautery was used to create a window in the posterior pericardium.     DECANNULATION AND CLOSURE   The patient was weaned from cardiopulmonary bypass and the retrograde cardioplegia catheter was removed. The venous cannula was then removed. Once appropriate deairing was confirmed by CHAD, the root vent was removed. Protamine was administered and the aortic cannula was removed. Appropriate flow and pulsatility index was confirmed for all bypass grafts using the Medistim flow probe. Ventricular epicardial pacing wires were placed. Hemostasis was achieved throughout the mediastinum. A 28 Danish Peter drain was placed in the left pleural space and a 28 Danish chest tube was placed in the mediastinum.      The sternum was re-approximated using sternal wires and plates. The fascia was closed with 0 Vicryl suture in running fashion. The subcutaneous layer was closed with 2-0 Vicryl suture in running fashion. The subcuticular layer was closed with 3-0 Monocryl suture in running fashion. Dermabond was placed as a biologic dressing.      The needle, instrument, and sponge counts were correct x2. The patient was transferred to the CTICU in stable condition.    Evidence of Infection: No   Complications:  None; patient tolerated the procedure well.    Disposition: ICU - intubated and hemodynamically stable.  Condition: Stable     Task Performed by RNFA or Surgical Assistant:  Endoscopic harvest of right greater saphenous vein, assistance with coronary artery bypass grafting and aortic valve replacement, and chest closure     Additional Details: None    Attending Attestation: I performed the procedure.    Zaida Molina MD  Phone Number: [phone]

## 2025-05-30 NOTE — ANESTHESIA PREPROCEDURE EVALUATION
Patient: Franky Sauceda    Procedure Information       Date/Time: 05/30/25 0645    Procedures:       CABG x 3 w/ LIMA/vein, AVR, MAZE, BRENT clip      CABG x 3 w/ LIMA/vein, AVR, MAZE, BRENT clip    Location: [facility] OR 19 / [facility] OR    Surgeons: Zaida Molina MD        Patient is a 74 y.o. male referred by Dr. Zaida Molina for CABG x 3 w/ LIMA/vein, AVR, MAZE, BRENT clip scheduled on 5-30-25.  Patient has a past medical history significant for: HTN, atypical atrial flutter, paroxysmal atrial fibrillation, CAD, and TIA  symptomatic severe aortic stenosis.      Relevant Problems   Anesthesia (within normal limits)      Cardiac   (+) Aortic valve stenosis with insufficiency   (+) Atherosclerosis of coronary artery   (+) Atrial fibrillation (Multi)   (+) Atypical atrial flutter   (+) Chest pain   (+) Coronary artery disease involving native coronary artery of native heart without angina pectoris   (+) Essential hypertension (Takes metoprolol at home at night -- believes he last took night before surgery)   (+) Hyperlipidemia   (+) Mixed hyperlipidemia   (+) Nonrheumatic aortic valve stenosis   (+) PAF (paroxysmal atrial fibrillation) (Multi)   (+) Severe aortic stenosis      Pulmonary   (+) NICKI (obstructive sleep apnea) (Non-compliant with CPAP -- has gotten better since lost significant amount of weight)   (+) Pulmonary hypertension (Multi) (Mildly elevated)      Neuro (within normal limits)   (+) TIA (transient ischemic attack)      GI  Takes Ozempic subcutaneous qweekly -- last dose 5/18/2025   (+) Gastroesophageal reflux disease (Well-controlled with meds)   (-) Dysphagia (Pt denies)      /Renal   (+) BPH (benign prostatic hyperplasia)   (+) Chronic renal impairment, stage 3a (Multi) (Hx proteinuria)      Liver (within normal limits)      Endocrine   (+) Diabetes mellitus, type 2 (Multi) (Pre-op blood sugar = 143)   (+) 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 (Multi)   (+) Severe  obesity (Multi) (Prior morbid obesity -- has lost significant amount of weight)      Hematology   (+) Anticoagulant long-term use (Takes Eliquis -- last dose 5/24/2025)      Musculoskeletal   (+) Chronic low back pain   (+) Lumbar disc disease   (+) Osteoarthritis   (+) Spinal stenosis      HEENT (within normal limits)      ID   (+) Herpes labialis      Skin (within normal limits)       Past Medical History  as of 5/23/2025 9:04 AM       Diagnosis Date   • Bence Huang proteinuria 07/27/2013     Bence Huang proteinuria   • Coronary artery disease involving native coronary artery of native heart without angina pectoris 04/02/2024     CAC 2101 (2017)   • Dorsalgia, unspecified 09/27/2016     Back pain, acute   • Encounter for immunization 06/29/2018     Need for shingles vaccine   • Fatigue 04/10/2023   • Hyperlipidemia     • Hypertension     • Localized edema 07/07/2022     Edema of extremities   • Moderate aortic stenosis 04/02/2024   • PAF (paroxysmal atrial fibrillation) (Multi) 04/02/2024   • Pain in left knee 12/08/2021     Acute pain of left knee   • Pain in unspecified knee 04/28/2020     Joint pain, knee   • Personal history of other diseases of the circulatory system       Personal history of coronary atherosclerosis   • Personal history of other diseases of urinary system 07/07/2022     History of renal insufficiency syndrome   • Personal history of other infectious and parasitic diseases 05/14/2018     History of herpes labialis   • Severe aortic stenosis 04/02/2024   • Sprain of other specified parts of left knee, initial encounter 01/02/2020     Injury of meniscus of left knee, initial encounter   • Unilateral primary osteoarthritis, left knee 08/20/2019     Arthritis of knee, left       Past Surgical History  as of 5/23/2025 9:04 AM        Procedure Laterality Date   • CARDIAC CATHETERIZATION N/A 5/9/2025     Procedure: LHC, With LV;  Surgeon: Loy Soni MD;  Location: [facility] Cardiac Cath Lab;  Service:  Cardiovascular;  Laterality: N/A;   • CHOLECYSTECTOMY   01/28/2014     Cholecystectomy   • COLONOSCOPY   01/28/2014     Complete Colonoscopy   • ESOPHAGOGASTRODUODENOSCOPY   01/28/2014     Diagnostic Esophagogastroduodenoscopy       Clinical information reviewed:    Allergies  Meds           Allergies  as of 5/23/2025 9:04 AM        Allergen Reactions   • Cerivastatin Other       Back/neck stiffness   • Jardiance [Empagliflozin] Unknown   • Mounjaro [Tirzepatide] GI Upset   • Nifedipine Other       Urinary urgency   • Statins-Hmg-Coa Reductase Inhibitors Other       Joint pain/stiffness   • Icosapent Ethyl Palpitations       Cardiac Cath (5/9/2025):  Coronary Lesion Summary:  Vessel       Stenosis   Vessel Segment  Left Main  80% stenosis     distal  LAD        40% stenosis    proximal  Circumflex 70% stenosis     ostial  RCA        95% stenosis    proximal  RCA        50% stenosis     distal  RPL        80% stenosis mid to distal       TTE (4/15/2025):  CONCLUSIONS:   1. The left ventricular systolic function is hyperdynamic, with a Vasquez's biplane calculated ejection fraction of 76%.   2. The left atrium is mildly dilated.   3. Severe aortic valve stenosis.   4. There is severe aortic valve cusp calcification.   5. There is severe aortic valve thickening.   6. Mild aortic valve regurgitation.   7. Mild to moderately elevated pulmonary artery pressure.       EKG (8/2/2024):  Normal sinus rhythm  Normal ECG  When compared with ECG of 16-MAR-2022 13:22,  QT has lengthened  See ED provider note for full interpretation and clinical correlation  Confirmed by Eva Wilcox ([id number]) on 8/5/2024 2:44:12 PM      NPO Detail:  No data recorded     Physical Exam    Airway  Mallampati: III  TM distance: <3 FB  Neck ROM: full  Mouth opening: 3 or more finger widths     Cardiovascular   Rhythm: regular  Rate: normal     Dental     (+) implants     Pulmonary Breath sounds clear to auscultation     Abdominal - normal  examAbdomen: soft  Bowel sounds: normal     Other findings: Many implants/crowns scattered throughout mouth, all dentition solid/none loose per patient.  TMD 2 FB      Anesthesia Plan    History of general anesthesia?: yes  History of complications of general anesthesia?: no    ASA 4     general and regional   (Plan GETA/PIVx2/CVP/CHAD/post-op vent;  Possible PA catheter if surgeon requests;  Patient to receive pre-op bilateral SAP nerve blocks by Acute Pain Service)  The patient is not a current smoker.  Patient did not smoke on day of procedure.  Education provided regarding risk of obstructive sleep apnea.  intravenous induction   Postoperative pain plan includes opioids.  Anesthetic plan and risks discussed with patient.  Use of blood products discussed with patient who consented to blood products.    Plan discussed with CRNA and attending.

## 2025-05-30 NOTE — H&P
CTICU History & Physical    Subjective   HPI:  73yo male with PMHof NIDDM, HTN, atypical atrial flutter, paroxysmal atrial fibrillation, CAD, and TIA who now presents s/p AVR, CABG x3, BRENT clip, MAZE, posterior pericardial window. He has had worsening shortness of breath and was being workup up for possible TAVR but was found to have multivessel CAD on LHC.     Cardiac Testing:     TTE: 4/15  CONCLUSIONS:   1. The left ventricular systolic function is hyperdynamic, with a Vasquez's biplane calculated ejection fraction of 76%.   2. The left atrium is mildly dilated.   3. Severe aortic valve stenosis.   4. There is severe aortic valve cusp calcification.   5. There is severe aortic valve thickening.   6. Mild aortic valve regurgitation.   7. Mild to moderately elevated pulmonary artery pressure.    [facility]: 5/9  Coronary Lesion Summary:  Vessel       Stenosis   Vessel Segment  Left Main  80% stenosis     distal  LAD        40% stenosis    proximal  Circumflex 70% stenosis     ostial  RCA        95% stenosis    proximal  RCA        50% stenosis     distal  RPL        80% stenosis mid to distal       Procedure/Surgeon: Tracy.   Median Sternotomy  CABG x 3: LIMA-LAD/ SVG-OM/SVG-PLV  AVR with 23mm Inspirus Resilia Bioprosthetic Aortic Valve  MAZE with Synergy Encompass  LAAC with AtriCure AtriClip 45mm  Posterior Pericardiotomy  Right Leg EVH  Sternal Closure with Wires and SternaLock XP Rigid Fixation System     Frontliner/Anesthesia: Shine Castillo/Nicholas  Out of OR Time (document on ventilator card): 1410     OR Course/Issues: oozy sternum. Sternal lock used.      CPB time: 134min  Cross clamp time: 120min  Circ arrest time: n/a  Echo Pre/Post: NML  Chest Tubes/Drains: L pleural and mediastinal   Temporary wires location/setting: V only      Fluids  Crystalloid: 2.5L  Colloid: 750ml  Cellsaver: 500ml  Products: n/a  EBL: 250ml  UOP: 500ml     Anesthesia  Intubation: MAC 5, grade 1  Intravenous Access: RIJ MAC with mini MAC,  PIV, L brachial angelita  AICD: n/a  PPM: n/a  Regional anesthesia: single shot serratus anterior shots bilaterally  Benzodiazepine dose/last administration: 1mg midazolam total  Opioid dose/last administration: methadone 20mg,  1000mcg fentanyl total  NMB dose/last administration: 150mg rocuronium total  TOF/ reversal given: not reversed  Antibiotic time: ancef, last 1215  Temperature on admission to ICU: 36.1    Medical History[1]  Surgical History[2]  Prescriptions Prior to Admission[3]  Cerivastatin, Jardiance [empagliflozin], Mounjaro [tirzepatide], Nifedipine, Statins-hmg-coa reductase inhibitors, and Icosapent ethyl  Social History[4]  Family History[5]    Review of Systems:  Intubated, sedated    Objective   Vitals:  Most Recent:  Vitals:    05/30/25 0606   BP: 161/73   Pulse: (!) 49   Resp: 16   Temp: 36.2 °C (97.2 °F)   SpO2: 95%       24hr Min/Max:  Temp  Min: 36.2 °C (97.2 °F)  Max: 36.2 °C (97.2 °F)  Pulse  Min: 49  Max: 49  BP  Min: 161/73  Max: 161/73  Resp  Min: 16  Max: 16  SpO2  Min: 95 %  Max: 95 %    I/O:  No intake/output data recorded.    LDA:  CVC 05/30/25 Double lumen Right Internal jugular (Active)   Placement Date/Time: 05/30/25 (c) 0750   Hand Hygiene Performed Prior to CVC Insertion: Yes  Site Prep: Chlorhexidine   Site Prep Agent has Completely Dried Before Insertion: Yes  All 5 Sterile Barriers Used (Gloves, Gown, Cap, Mask, Large Sterile Marge...   Number of days: 0       Arterial Line 05/30/25 Left Brachial (Active)   Placement Date/Time: 05/30/25 (c) 0709   Size: 20 G  Orientation: Left  Location: Brachial  Securement Method: Transparent dressing  Patient Tolerance: Tolerated well   Number of days: 0       ETT  7.5 mm (Active)   Placement Date/Time: 05/30/25 (c) 0755   Mask Ventilation: Vent by mask  Technique: Direct laryngoscopy  ETT Type: ETT - single  Single Lumen Tube Size: 7.5 mm  Cuffed: Yes  Laryngoscope: Flory  Blade Size: 4  Location: Oral  Grade View: Full view...   Number  "of days: 0       Urethral Catheter Temperature probe 14 Fr. (Active)   Placement Date/Time: 05/30/25 0750   Placed by: MAYUR Ball  Hand Hygiene Completed: Yes  Catheter Type: Temperature probe  Tube Size (Fr.): 14 Fr.  Catheter Balloon Size: 10 mL  Urine Returned: Yes   Number of days: 0       Physical Exam:   - CONSTITUTION: robust appearing older male in ICU bed  - NEUROLOGIC: PERRL. Sedated  - CARDIOVASCULAR: NSR with frequent PVCs. V wires, backup VVI  - RESPIRATORY: Intubated. Clear bilaterally. Chest tubes without airleak.   - GI: soft, absent BS  - : folwy with dark yellow urine  - EXTREMITIES: pulses intact. No edema. cool  - SKIN: intact other than surgical incisions  - PSYCHIATRIC: ANGELA    Lab Review: CXR and KUB reviewed            No lab exists for component: \"LABALBU\"      Results from last 7 days   Lab Units 05/30/25  1235   POCT PH, ARTERIAL pH 7.32*   POCT PCO2, ARTERIAL mm Hg 40   POCT PO2, ARTERIAL mm Hg 124*   POCT HCO3 CALCULATED, ARTERIAL mmol/L 20.6*   POCT BASE EXCESS, ARTERIAL mmol/L -5.1*       Most recent labs and imaging reviewed.    Daily Risk Screen  Intubated: WTE  Central line: continue for vasoactives  Angelo: strict I/Os    Assessment/Plan     Assessment:  75yo male with PMHof NIDDM, HTN, atypical atrial flutter, paroxysmal atrial fibrillation, CAD, and TIA who now presents s/p AVR, CABG x3, BRENT clip, MAZE, posterior pericardial window.      Plan:  NEURO:  PMH of TIA without residual deficits. Patient is intubated and sedated on propofol infusion. Acute post operative pain, received single shot bilateral MOON blocks and methadone in OR.   -->  - Serial neuro and pain assessments   - Continue propofol until NMB reversal, then daily sedation vacation at minimum   - NMB reversal when normothermic and hemodynamically stable.    - Scheduled Tylenol   - PRN oxycodone  - PRN dilaudid for pain   - PT Consult, OOB to chair as tolerated, chair position if not tolerated   - CAM ICU score " qshift  - Sleep/wake cycle hygiene  - Hold home ASA/statin     CV:  Patient has a history of HTN, atypical atrial flutter, paroxysmal atrial fibrillation, CAD and is now status post AVR, CABG x3, BRENT clip, MAZE, posterior pericardial window. Pre/Post EF: NML. Arrived to CTICU on epi 0.04 mcg/kg/min and levo 0.04 mcg/kg/min for hypotension. Hypovolemic with lactic acidosis. NSR/ sinus americo with frequent PVCs. V epicardial wires set VVI @ 35.-->  - Maintain goal MAP 70-90  - Volume resuscitate as clinically indicated  - Maintain epicardial wires set VVI @ 35, pressure not improved with pacing VVI higher  - Start statin  - wean levo then epi  - Hold home dilatiazem ER, Losartan, metoprolol     PULM:  No history of pulmonary disease.  Currently intubated on ventilator. Chest tubes with L pleural and mediastinal. Robust oxygenation with appropriate ventilation. -->  - F/u post op CXR  - Once reversed, wean ventilator settings towards CPAP & extubation   - Wean FiO2 maintaining SpO2 >92%.   - IS q1h and OOB to chair when extubated  - Chest tubes to wall suction.    GI:  PMH of GERD on home PPI.  OG in place.-->  - Continue home  - NPO, will perform bedside swallow eval post extubation   - Colace/senna BID and miralax BID    :  CSA-JO Risk Score low.  No history of renal disease, baseline creatinine 1-1.4. Creatinine stable post-op. Marie in place and making adequate UOP but clinically appears hypovolemic. -->  - Continue marie catheter for strict I/Os.  - Goal UOP 0.5ml/kg/hr  - RFP as clinically indicated  - Replete electrolytes per CTICU protocol     ENDO:  PMH of NIDDM. A1c: 7. Stress hyperglycemia managed on insulin infusion. -->  - Maintain BG <180, insulin per CTICU protocol. Continuing insulin infusion for now  - Hold home semaglutide and metformin     HEME:  Acute blood loss anemia and thrombocytopenia with acceptable chest tube output.-->    - Monitor drain output volume and characteristics  - CBC, coags, and  fibrinogen post op and as clinically indicated  - Start ASA 6hrs post-op for CABG  - SQH tomorrow   - SCDs for DVT prophylaxis.  - Last type and screen: 5/30  - hold home Eliquis for afib/flutter for now     ID:  Afebrile, no current indications of infection. MRSA negative. -->  - Trend temp q4h  - Periop cefazolin x 48hrs     Skin:  No active skin issues.  - preventative Mepilex dressings in place on sacrum and heels  - change preventative Mepilex weekly or more frequently as indicated (when moist/soiled)   - every shift skin assessment per nursing and weekly ICU skin rounds  - moisture barrier to be applied with errol care  - active skin problems addressed with nursing on daily rounds     Proph:  SCDs  PPI     G:  Line  Right IJ MAC w Minimac placed 5/30  Left brachial a-line placed 5/30  PIV    F: Family: will update at bedside postoperatively.     A,B,C,D,E,F,G: reviewed    I personally spent 50 minutes of critical care time directly and personally managing the patient exclusive of separately billable procedures.     Dispo: CTICU care for now.    CTICU TEAM PHONE [phone]           [1]   Past Medical History:  Diagnosis Date    Arrhythmia     Bence Huang proteinuria 07/27/2013    Bence Huang proteinuria    Coronary artery disease involving native coronary artery of native heart without angina pectoris 04/02/2024    CAC 2101 (2017)    Dorsalgia, unspecified 09/27/2016    Back pain, acute    Encounter for immunization 06/29/2018    Need for shingles vaccine    Fatigue 04/10/2023    GERD (gastroesophageal reflux disease)     History of recent steroid use     Hyperlipidemia     Hypertension     Localized edema 07/07/2022    Edema of extremities    Moderate aortic stenosis 04/02/2024    PAF (paroxysmal atrial fibrillation) (Multi) 04/02/2024    Pain in left knee 12/08/2021    Acute pain of left knee    Pain in unspecified knee 04/28/2020    Joint pain, knee    Personal history of other diseases of the circulatory system      Personal history of coronary atherosclerosis    Personal history of other diseases of urinary system 07/07/2022    History of renal insufficiency syndrome    Personal history of other infectious and parasitic diseases 05/14/2018    History of herpes labialis    Severe aortic stenosis 04/02/2024    Sleep apnea     no CPAP being used    Sprain of other specified parts of left knee, initial encounter 01/02/2020    Injury of meniscus of left knee, initial encounter    Type 2 diabetes mellitus     Unilateral primary osteoarthritis, left knee 08/20/2019    Arthritis of knee, left   [2]   Past Surgical History:  Procedure Laterality Date    CARDIAC CATHETERIZATION N/A 5/9/2025    Procedure: LHC, With LV;  Surgeon: Loy Soni MD;  Location: [facility] Cardiac Cath Lab;  Service: Cardiovascular;  Laterality: N/A;    CHOLECYSTECTOMY  01/28/2014    Cholecystectomy    COLONOSCOPY  01/28/2014    Complete Colonoscopy    ESOPHAGOGASTRODUODENOSCOPY  01/28/2014    Diagnostic Esophagogastroduodenoscopy   [3]   Medications Prior to Admission   Medication Sig Dispense Refill Last Dose/Taking    aspirin 81 mg EC tablet Take 1 tablet (81 mg) by mouth once daily. Do not fill before May 10, 2025. (Patient taking differently: Take 1 tablet (81 mg) by mouth once daily. Taking #1 tablet twice daily (temporary dose in prep of procedure)) 90 tablet 3 5/29/2025 at  6:00 PM    atorvastatin (Lipitor) 80 mg tablet Take 1 tablet (80 mg) by mouth once daily. 90 tablet 3 5/29/2025 at  6:00 PM    cholecalciferol (Vitamin D-3) 50 mcg (2,000 unit) capsule Take 1 capsule (50 mcg) by mouth once daily.   Past Week    metFORMIN (Glucophage) 500 mg tablet Take 2 tablets (1,000 mg) by mouth 2 times a day. 360 tablet 3 5/29/2025 at  6:00 PM    pantoprazole (ProtoNix) 40 mg EC tablet Take 1 tablet (40 mg) by mouth once daily. 90 tablet 3 5/29/2025 at  6:00 PM    Blood glucose monitoring meter kit kit 1 each if needed.       blood sugar diagnostic (True Metrix  Glucose Test Strip) strip 100 strips once daily. 100 strip 3     [] chlorhexidine (Hibiclens) 4 % external liquid Apply topically once daily as needed for wound care for up to 5 days. 473 mL 0     [] chlorhexidine (Peridex) 0.12 % solution Use 15 mL in the mouth or throat if needed (Please rinse mouth night before surgey and morning of surgery) for up to 2 days. 473 mL 0     dilTIAZem ER (Tiazac) 300 mg 24 hr capsule Take 1 capsule (300 mg) by mouth once daily. 90 capsule 3     Eliquis 5 mg tablet Take 1 tablet (5 mg) by mouth 2 times a day. 180 tablet 3 2025    FreeStyle Jaun 3 [place] misc Use as instructed 1 each 0     FreeStyle Jaun 3 Sensor device Change sensor every 14 days as directed. Rotate sites. 2 each 3     lancets misc 100 Lancets once daily. 100 each 3     losartan (Cozaar) 100 mg tablet Take 1 tablet (100 mg) by mouth once daily. 90 tablet 3     metoprolol succinate XL (Toprol-XL) 25 mg 24 hr tablet Take 1 tablet (25 mg) by mouth once daily. 90 tablet 3     nitroglycerin (Nitrostat) 0.4 mg SL tablet Place 1 tablet (0.4 mg) under the tongue every 5 minutes if needed for chest pain. 30 tablet 0     semaglutide (Ozempic) 0.25 mg or 0.5 mg (2 mg/3 mL) pen injector INJECT 0.5MG UNDER THE SKIN ONCE A WEEK AS DIRECTED 3 mL 2 2025   [4]   Social History  Tobacco Use    Smoking status: Never     Passive exposure: Never    Smokeless tobacco: Never   Vaping Use    Vaping status: Never Used   Substance Use Topics    Alcohol use: Not Currently    Drug use: Never   [5]   Family History  Problem Relation Name Age of Onset    Hypertension Mother      Other (abdominal aortic aneurysm) Father      Hyperlipidemia Father      Hypertension Father

## 2025-05-30 NOTE — PROGRESS NOTES
CT SURGERY  05/30 s/p AVR, CABG x3, BRENT clip, MAZE, posterior pericardial window (Tracy)     DC PLAN  TBD - Care Transitions is following to develop a safe & supportive discharge plan in collaboration with multidisciplinary team (&) patient/family/significant others.      PAYOR   [facility]    PT/OT   ( ) awaiting    COMPLETED  (X) Daily ongoing review of patient via chart and/or (M-F) IDT rounds    Veronica Welsh (LSW, MSW)

## 2025-05-30 NOTE — ANESTHESIA PROCEDURE NOTES
Central Venous Line:    Date/Time: 5/30/2025 7:50 AM    A central venous line was placed in the OR for the following indication(s): central venous access and CVP monitoring.  Staffing  Performed: CRNA   Authorized by: Jay Peterson MD    Performed by: TONY Weeks-CRNA    Sterility preparation included the following: provider hand hygiene performed prior to central venous catheter insertion, all 5 sterile barriers used (gloves, gown, cap, mask, large sterile drape) during central venous catheter insertion, antiseptic used during central venous catheter insertion and skin prep agent completely dried prior to procedure.  Medical reason for not performing maximal sterile barrier technique: no  The patient was placed in Trendelenburg position.    Right internal jugular vein was prepped.    The site was prepped with Chlorhexidine.  Size: 9 Fr (8 Fr)   Length: 11.5  Catheter type: introducer   Number of Lumens: double lumen    This catheter was not an oximetric catheter.    During the procedure, the following specific steps were taken: target vein identified, needle advanced into vein and blood aspirated and guidewire advanced into vein.  Seldinger technique used.  Procedure performed using ultrasound guidance.  Sterile gel and probe cover used in ultrasound-guided central venous catheter insertion.    Intravenous verification was obtained by ultrasound, venous blood return and manometry.      Post insertion care included: all ports aspirated, all ports flushed easily, guidewire removed intact, Biopatch applied, line sutured in place and dressing applied.    During the procedure the patient experienced: patient tolerated procedure well with no complications.           images stored in chart

## 2025-05-30 NOTE — PROGRESS NOTES
Plan:  - analgesia  - SAT/SBT  WTE possibly   - pressers/tropes for MAP>65 and/or CI>=2/clinical params; weaning as tolerated   - volume resuscitation as clinically indicated   - hold home anti_HTN meds and anti-hyperglcyemic meds  - start statin  - Dispo: ICU    Quality/Safety/Proph:  - GI prophy: PPI  - DVT prophy: scd and held d/t post-op   - Central line: parenteral medications (I.e. chemo, vasoactive) and access  - Angelo: critically ill patient who need accurate urinary output measurements  - Restraint: needed, pulling at lines/tubes and agitation    Assessment:    Neuro/MSK: expected acute post op pain  and h/o TIA without residual  A-F bundle; Sleep Hygiene measures: appropriate daytime stimulation/physical activity. Lights out at 2100, avoidance of night time lab draws/night baths, minimize mind altering medicaments  PT/OT and OOB as appropriate    CV: HTN hx , HPL, Afib with atypical flutter , CAD s/p CABG - 3vessel, and aortic stenosis - now s/p AVR   -    epi  and levo     Pulm: acute post op pulmonary insufficiency   BPH with IS/flutter/OOB  Current vent settings: Vent Mode: Volume control/assist control  FiO2 (%):  [40 %-50 %] 40 %  S RR:  [16] 16  S VT:  [560 mL] 560 mL  PEEP/CPAP (cm H2O):  [8 cm H20] 8 cm H20  MAP (cm H2O):  [12] 12    Renal: CKD 3a   Trend UOP and renal indices; replete lytes PRN     GI: Obesity  and GERD   Proph with PPI  PRN anti-emetics, Bowel regimen, and swallow eval once extubated  ID/rheum: n/a  Current abx: errol-op with cefazolin x48h  Follow cx data     Endo: NIDDM with stress hyperlgycemia   Start ISS  for BG goal <180  Last HbA1c: 5/23/2025: 7.0 % (H)      Heme: ABL anemia    -     Trend Hb and transfuse PRN for goal Hb>7  DVT proph with scd and held d/t post-op     LDA:  CVC 05/30/25 Double lumen Right Internal jugular (Active)   Placement Date/Time: 05/30/25 (c) 0750   Hand Hygiene Performed Prior to CVC Insertion: Yes  Site Prep: Chlorhexidine   Site Prep Agent has  Completely Dried Before Insertion: Yes  All 5 Sterile Barriers Used (Gloves, Gown, Cap, Mask, Large Sterile Marge...   Number of days: 0       Arterial Line 05/30/25 Left Brachial (Active)   Placement Date/Time: 05/30/25 (c) 0731   Size: 20 G  Orientation: Left  Location: Brachial  Securement Method: Transparent dressing  Patient Tolerance: Tolerated well   Number of days: 0       ETT  7.5 mm (Active)   Placement Date/Time: 05/30/25 (c) 0745   Mask Ventilation: Vent by mask  Technique: Direct laryngoscopy  ETT Type: ETT - single  Single Lumen Tube Size: 7.5 mm  Cuffed: Yes  Laryngoscope: Flory  Blade Size: 4  Location: Oral  Grade View: Full view...   Number of days: 0       Urethral Catheter Temperature probe 14 Fr. (Active)   Placement Date/Time: 05/30/25 0750   Placed by: MAYUR Ball  Hand Hygiene Completed: Yes  Catheter Type: Temperature probe  Tube Size (Fr.): 14 Fr.  Catheter Balloon Size: 10 mL  Urine Returned: Yes   Number of days: 0       Chest Tube 2 Mediastinal (Active)   Placement Date/Time: 05/30/25 1400   Tube Number: 2  Chest Tube Location: Mediastinal  Chest Tube Drainage System: Suction   Number of days: 0       Chest Tube 1 Left Pleural (Active)   Placement Date/Time: 05/30/25 1400   Tube Number: 1  Chest Tube Orientation: Left  Chest Tube Location: Pleural  Chest Tube Drainage System: Suction   Number of days: 0       Exam:    A&O x 0; intubated and sedated  NSR  Adequate air-exchange  Abd soft, NT  Extremities warm     REASON FOR ADMISSION    74 y.o. male with  NIDDM, HTN, atypical atrial flutter, paroxysmal atrial fibrillation, CAD, and TIA who now presents s/p AVR, CABG x3, BRENT clip, MAZE, posterior pericardial window. He has had worsening shortness of breath and was being workup up for possible TAVR but was found to have multivessel CAD on [facility].     TODAY'S EVENTS    5/30: admitted post AVR/CABGx3    This critically ill patient continues to be at-risk for clinically significant  "deterioration / failure due to the above mentioned dysfunctional, unstable organ systems.  I have personally identified and managed all complex critical care issues to prevent aforementioned clinical deterioration.  Critical care time is spent at bedside and/or the immediate area and has included, but is not limited to, the review of diagnostic tests, labs, radiographs, serial assessments of hemodynamics, respiratory status, ventilatory management, and family updates.  Time spent in procedures and teaching are reported separately. CF CRITICAL CARE TIME: 45 minutes             LABS:  CMP:      No lab exists for component: \"CA\"  CBC:  Results from last 7 days   Lab Units 05/30/25  1429   WBC AUTO x10*3/uL 18.5*   HEMOGLOBIN g/dL 10.7*   HEMATOCRIT % 32.9*   PLATELETS AUTO x10*3/uL 141*     COAG:     ABO:   ABO TYPE   Date Value Ref Range Status   05/30/2025 A  Final     HEME/ENDO:     CARDIAC:       No lab exists for component: \"CK\", \"CKMBP\"  MICRO: No results found for the last 90 days.    "

## 2025-05-30 NOTE — ANESTHESIA POSTPROCEDURE EVALUATION
Patient: Franky Sauceda    Procedure Summary       Date: 05/30/25 Room / Location: [facility] OR 19 / Virtual [facility] OR    Anesthesia Start: 0713 Anesthesia Stop: 1434    Procedure: Cornonary artery bypass grafting x 3 LIMA-LAD, SVG-OM, SVG-PLV, right endovascular SVG harvest, Aortic valve replacement with 23 mm Inspiris, MAZE, Left atrial appendage clip with 45 mm Atriclip; posterior pericardial window (Chest) Diagnosis:       Severe aortic stenosis      Coronary artery disease involving native coronary artery of native heart without angina pectoris      (Severe aortic stenosis [I35.0])      (Coronary artery disease involving native coronary artery of native heart without angina pectoris [I25.10])    Surgeons: Zaida Molina MD Responsible Provider: Nelida Ramirez MD    Anesthesia Type: general, regional ASA Status: 4            Anesthesia Type: general, regional    Vitals Value Taken Time   /66 05/30/25 14:27   Temp 36.1 05/30/25 14:37   Pulse 72 05/30/25 14:37   Resp 16 05/30/25 14:37   SpO2 97 % 05/30/25 14:37   Vitals shown include unfiled device data.    Anesthesia Post Evaluation    Patient location during evaluation: ICU  Patient participation: complete - patient cannot participate  Level of consciousness: sedated  Pain management: adequate  Airway patency: patent  Cardiovascular status: acceptable  Respiratory status: acceptable  Hydration status: acceptable  Postoperative Nausea and Vomiting: none        No notable events documented.

## 2025-05-30 NOTE — ANESTHESIA PROCEDURE NOTES
Arterial Line:    Date/Time: 5/30/2025 7:31 AM    Staffing  Performed: CRNA   Authorized by: Jay Peterson MD    Performed by: TONY Weeks-CRNA    An arterial line was placed. Procedure performed using ultrasound guidance.in the OR for the following indication(s): continuous blood pressure monitoring and blood sampling needed.    A 20 gauge (size), 12 cm (length), Arrow (type) catheter was placed into the Left brachial artery, secured by Tegaderm,   Seldinger technique used.  Events:  patient tolerated procedure well with no complications.       images stored in chart

## 2025-05-30 NOTE — ANESTHESIA PROCEDURE NOTES
Airway  Date/Time: 5/30/2025 7:45 AM  Reason: elective    Airway not difficult    Staffing  Performed: CRNA   Authorized by: Jay Peterson MD    Performed by: TONY Weeks-NISHI  Patient location during procedure: OR    Patient Condition  Indications for airway management: anesthesia  Patient position: sniffing  MILS not maintained throughout  No planned trial extubation  Sedation level: deep     Final Airway Details   Preoxygenated: yes  Final airway type: endotracheal airway  Successful airway: ETT  Cuffed: yes   Successful intubation technique: direct laryngoscopy  Adjuncts used in placement: intubating stylet  Endotracheal tube insertion site: oral  Blade: Flory  Blade size: #4  ETT size (mm): 7.5  Cormack-Lehane Classification: grade I - full view of glottis  Placement verified by: chest auscultation and capnometry   Cuff volume (mL): 13  Measured from: lips  ETT to lips (cm): 22  Ventilation between attempts: none  Number of attempts at approach: 1  Number of other approaches attempted: 0

## 2025-05-30 NOTE — INTERVAL H&P NOTE
H&P reviewed. The patient was examined and there are no changes to the H&P.    Plan for bioprosthetic aortic valve replacement, CABG x3 (LIMA & SVG), Maze procedure, and left atrial appendage clip. Informed consent obtained.     Zaida Molina MD  Cardiac Surgeon  Division of Cardiac Surgery  [facility]

## 2025-05-30 NOTE — CONSULTS
Franky Sauceda is a 74 y.o. year old male patient who presents for Procedure(s):  CABG x 3 w/ LIMA/vein, AVR, MAZE, BRENT clip  CABG x 3 w/ LIMA/vein, AVR, MAZE, BRENT clip with Zaida Molina MD on 5/30/2025.  Acute Pain consulted for assistance with pain control.     Anticipated Postop Pain Issues -   Palliative: typically relieved with IV analgesics and regional local anesthetics  Provocative: typically with movement  Quality: typically burning and aching  Radiation: typically none  Severity: typically severe 8-10/10  Timing: typically constant    Medical History[1]     Surgical History[2]     Family History[3]     Social History     Socioeconomic History    Marital status:      Spouse name: Not on file    Number of children: Not on file    Years of education: Not on file    Highest education level: Not on file   Occupational History    Not on file   Tobacco Use    Smoking status: Never     Passive exposure: Never    Smokeless tobacco: Never   Vaping Use    Vaping status: Never Used   Substance and Sexual Activity    Alcohol use: Not Currently    Drug use: Never    Sexual activity: Yes   Other Topics Concern    Not on file   Social History Narrative    Not on file     Social Drivers of Health     Financial Resource Strain: Low Risk  (8/2/2024)    Overall Financial Resource Strain (CARDIA)     Difficulty of Paying Living Expenses: Not hard at all   Food Insecurity: Not on file   Transportation Needs: No Transportation Needs (8/2/2024)    PRAPARE - Transportation     Lack of Transportation (Medical): No     Lack of Transportation (Non-Medical): No   Physical Activity: Not on file   Stress: Not on file   Social Connections: Not on file   Intimate Partner Violence: Not on file   Housing Stability: Low Risk  (8/2/2024)    Housing Stability Vital Sign     Unable to Pay for Housing in the Last Year: No     Number of Times Moved in the Last Year: 0     Homeless in the Last Year: No        RX Allergies[4]      Review  of Systems  Gen: No fatigue, anorexia, insomnia, fever.   Eyes: No vision loss, double vision, drainage, eye pain.   ENT: No pharyngitis, dry mouth, no hearing changes or ear discharge  Cardiac: No chest pain, palpitations, syncope, near syncope.   Pulmonary: No shortness of breath, cough, hemoptysis.   Heme/lymph: No swollen glands, fever, bleeding.   GI: No abdominal pain, change in bowel habits, melena, hematemesis, hematochezia, nausea, vomiting, diarrhea.   : No discharge, dysuria, frequency, urgency, hematuria.  Endo: No polyuria or weight loss.   Musculoskeletal: Negative for any pain or loss of ROM/weakness  Skin: No rashes or lesions  Neuro: Normal speech, no numbness or weakness. No gait difficulties  Review of systems is otherwise negative unless stated above or in history of present illness.    Physical Exam:  Constitutional:  no distress, alert and cooperative  Eyes: clear sclera  Head/Neck: No apparent injury, trachea midline  Respiratory/Thorax: Patent airways, thorax symmetric, breathing comfortably  Cardiovascular: no pitting edema  Gastrointestinal: Nondistended  Musculoskeletal: ROM intact  Extremities: no clubbing  Neurological: alert, gamboa x4  Psychological: Appropriate affect    Results for orders placed or performed during the hospital encounter of 05/30/25 (from the past 24 hours)   POCT GLUCOSE   Result Value Ref Range    POCT Glucose 143 (H) 74 - 99 mg/dL        Franky Sauceda is a 74 y.o. year old male patient who presents for Procedure(s):  CABG x 3 w/ LIMA/vein, AVR, MAZE, BRENT clip  CABG x 3 w/ LIMA/vein, AVR, MAZE, BRENT clip with Zaida Molina MD on 5/30/2025. Acute Pain consulted for assistance with pain control.     Plan:    - Bilateral single shot serratus anterior blocks performed pre-operatively 05/30/25  - Pain medications per primary team  - Will see on POD1 if inpatient    Acute Pain Resident  pg [phone] ph [phone]        [1]   Past Medical History:  Diagnosis Date    Arrhythmia      Bence Huang proteinuria 07/27/2013    Bence Huang proteinuria    Coronary artery disease involving native coronary artery of native heart without angina pectoris 04/02/2024    CAC 2101 (2017)    Dorsalgia, unspecified 09/27/2016    Back pain, acute    Encounter for immunization 06/29/2018    Need for shingles vaccine    Fatigue 04/10/2023    GERD (gastroesophageal reflux disease)     History of recent steroid use     Hyperlipidemia     Hypertension     Localized edema 07/07/2022    Edema of extremities    Moderate aortic stenosis 04/02/2024    PAF (paroxysmal atrial fibrillation) (Multi) 04/02/2024    Pain in left knee 12/08/2021    Acute pain of left knee    Pain in unspecified knee 04/28/2020    Joint pain, knee    Personal history of other diseases of the circulatory system     Personal history of coronary atherosclerosis    Personal history of other diseases of urinary system 07/07/2022    History of renal insufficiency syndrome    Personal history of other infectious and parasitic diseases 05/14/2018    History of herpes labialis    Severe aortic stenosis 04/02/2024    Sleep apnea     no CPAP being used    Sprain of other specified parts of left knee, initial encounter 01/02/2020    Injury of meniscus of left knee, initial encounter    Type 2 diabetes mellitus     Unilateral primary osteoarthritis, left knee 08/20/2019    Arthritis of knee, left   [2]   Past Surgical History:  Procedure Laterality Date    CARDIAC CATHETERIZATION N/A 5/9/2025    Procedure: LHC, With LV;  Surgeon: Loy Soni MD;  Location: [facility];  Service: Cardiovascular;  Laterality: N/A;    CHOLECYSTECTOMY  01/28/2014    Cholecystectomy    COLONOSCOPY  01/28/2014    Complete Colonoscopy    ESOPHAGOGASTRODUODENOSCOPY  01/28/2014    Diagnostic Esophagogastroduodenoscopy   [3]   Family History  Problem Relation Name Age of Onset    Hypertension Mother      Other (abdominal aortic aneurysm) Father      Hyperlipidemia Father       Hypertension Father     [4]   Allergies  Allergen Reactions    Cerivastatin Other     Back/neck stiffness    Jardiance [Empagliflozin] Unknown    Mounjaro [Tirzepatide] GI Upset    Nifedipine Other     Urinary urgency    Statins-Hmg-Coa Reductase Inhibitors Other     Joint pain/stiffness    Icosapent Ethyl Palpitations

## 2025-05-31 ENCOUNTER — APPOINTMENT (OUTPATIENT)
Dept: RADIOLOGY | Facility: HOSPITAL | Age: 75
DRG: 219 | End: 2025-05-31
Payer: COMMERCIAL

## 2025-05-31 PROBLEM — G89.18 ACUTE POSTOPERATIVE PAIN: Status: ACTIVE | Noted: 2025-05-31

## 2025-05-31 LAB
ALBUMIN SERPL BCP-MCNC: 3.4 G/DL (ref 3.4–5)
ALBUMIN SERPL BCP-MCNC: 3.5 G/DL (ref 3.4–5)
ANION GAP BLDA CALCULATED.4IONS-SCNC: 10 MMO/L (ref 10–25)
ANION GAP BLDA CALCULATED.4IONS-SCNC: 12 MMO/L (ref 10–25)
ANION GAP SERPL CALC-SCNC: 13 MMOL/L (ref 10–20)
ANION GAP SERPL CALC-SCNC: 14 MMOL/L (ref 10–20)
BASE EXCESS BLDA CALC-SCNC: -1.9 MMOL/L (ref -2–3)
BASE EXCESS BLDA CALC-SCNC: -5.7 MMOL/L (ref -2–3)
BODY TEMPERATURE: 37 DEGREES CELSIUS
BODY TEMPERATURE: 37 DEGREES CELSIUS
BUN SERPL-MCNC: 26 MG/DL (ref 6–23)
BUN SERPL-MCNC: 30 MG/DL (ref 6–23)
CA-I BLD-SCNC: 1.19 MMOL/L (ref 1.1–1.33)
CA-I BLD-SCNC: 1.2 MMOL/L (ref 1.1–1.33)
CA-I BLDA-SCNC: 1.14 MMOL/L (ref 1.1–1.33)
CA-I BLDA-SCNC: 1.19 MMOL/L (ref 1.1–1.33)
CALCIUM SERPL-MCNC: 8.5 MG/DL (ref 8.6–10.6)
CALCIUM SERPL-MCNC: 8.5 MG/DL (ref 8.6–10.6)
CHLORIDE BLDA-SCNC: 107 MMOL/L (ref 98–107)
CHLORIDE BLDA-SCNC: 112 MMOL/L (ref 98–107)
CHLORIDE SERPL-SCNC: 105 MMOL/L (ref 98–107)
CHLORIDE SERPL-SCNC: 110 MMOL/L (ref 98–107)
CO2 SERPL-SCNC: 22 MMOL/L (ref 21–32)
CO2 SERPL-SCNC: 23 MMOL/L (ref 21–32)
CREAT SERPL-MCNC: 1.47 MG/DL (ref 0.5–1.3)
CREAT SERPL-MCNC: 1.68 MG/DL (ref 0.5–1.3)
EGFRCR SERPLBLD CKD-EPI 2021: 42 ML/MIN/1.73M*2
EGFRCR SERPLBLD CKD-EPI 2021: 49 ML/MIN/1.73M*2
ERYTHROCYTE [DISTWIDTH] IN BLOOD BY AUTOMATED COUNT: 14 % (ref 11.5–14.5)
ERYTHROCYTE [DISTWIDTH] IN BLOOD BY AUTOMATED COUNT: 14.4 % (ref 11.5–14.5)
GLUCOSE BLD MANUAL STRIP-MCNC: 136 MG/DL (ref 74–99)
GLUCOSE BLD MANUAL STRIP-MCNC: 139 MG/DL (ref 74–99)
GLUCOSE BLD MANUAL STRIP-MCNC: 156 MG/DL (ref 74–99)
GLUCOSE BLD MANUAL STRIP-MCNC: 158 MG/DL (ref 74–99)
GLUCOSE BLD MANUAL STRIP-MCNC: 161 MG/DL (ref 74–99)
GLUCOSE BLD MANUAL STRIP-MCNC: 163 MG/DL (ref 74–99)
GLUCOSE BLD MANUAL STRIP-MCNC: 175 MG/DL (ref 74–99)
GLUCOSE BLD MANUAL STRIP-MCNC: 181 MG/DL (ref 74–99)
GLUCOSE BLDA-MCNC: 144 MG/DL (ref 74–99)
GLUCOSE BLDA-MCNC: 158 MG/DL (ref 74–99)
GLUCOSE SERPL-MCNC: 147 MG/DL (ref 74–99)
GLUCOSE SERPL-MCNC: 168 MG/DL (ref 74–99)
HCO3 BLDA-SCNC: 19.3 MMOL/L (ref 22–26)
HCO3 BLDA-SCNC: 22.1 MMOL/L (ref 22–26)
HCT VFR BLD AUTO: 30.5 % (ref 41–52)
HCT VFR BLD AUTO: 31.1 % (ref 41–52)
HCT VFR BLD EST: 29 % (ref 41–52)
HCT VFR BLD EST: 31 % (ref 41–52)
HGB BLD-MCNC: 9.9 G/DL (ref 13.5–17.5)
HGB BLD-MCNC: 9.9 G/DL (ref 13.5–17.5)
HGB BLDA-MCNC: 10.3 G/DL (ref 13.5–17.5)
HGB BLDA-MCNC: 9.6 G/DL (ref 13.5–17.5)
INHALED O2 CONCENTRATION: 36 %
INHALED O2 CONCENTRATION: 37 %
LACTATE BLDA-SCNC: 1.2 MMOL/L (ref 0.4–2)
LACTATE BLDA-SCNC: 1.2 MMOL/L (ref 0.4–2)
MAGNESIUM SERPL-MCNC: 2.51 MG/DL (ref 1.6–2.4)
MAGNESIUM SERPL-MCNC: 2.7 MG/DL (ref 1.6–2.4)
MCH RBC QN AUTO: 30.7 PG (ref 26–34)
MCH RBC QN AUTO: 30.7 PG (ref 26–34)
MCHC RBC AUTO-ENTMCNC: 31.8 G/DL (ref 32–36)
MCHC RBC AUTO-ENTMCNC: 32.5 G/DL (ref 32–36)
MCV RBC AUTO: 94 FL (ref 80–100)
MCV RBC AUTO: 96 FL (ref 80–100)
NRBC BLD-RTO: 0 /100 WBCS (ref 0–0)
NRBC BLD-RTO: 0 /100 WBCS (ref 0–0)
OXYHGB MFR BLDA: 95.8 % (ref 94–98)
OXYHGB MFR BLDA: 96.6 % (ref 94–98)
PCO2 BLDA: 34 MM HG (ref 38–42)
PCO2 BLDA: 35 MM HG (ref 38–42)
PH BLDA: 7.35 PH (ref 7.38–7.42)
PH BLDA: 7.42 PH (ref 7.38–7.42)
PHOSPHATE SERPL-MCNC: 2.7 MG/DL (ref 2.5–4.9)
PHOSPHATE SERPL-MCNC: 4 MG/DL (ref 2.5–4.9)
PLATELET # BLD AUTO: 109 X10*3/UL (ref 150–450)
PLATELET # BLD AUTO: 111 X10*3/UL (ref 150–450)
PO2 BLDA: 76 MM HG (ref 85–95)
PO2 BLDA: 98 MM HG (ref 85–95)
POTASSIUM BLDA-SCNC: 4.1 MMOL/L (ref 3.5–5.3)
POTASSIUM BLDA-SCNC: 4.4 MMOL/L (ref 3.5–5.3)
POTASSIUM SERPL-SCNC: 4.1 MMOL/L (ref 3.5–5.3)
POTASSIUM SERPL-SCNC: 4.6 MMOL/L (ref 3.5–5.3)
RBC # BLD AUTO: 3.23 X10*6/UL (ref 4.5–5.9)
RBC # BLD AUTO: 3.23 X10*6/UL (ref 4.5–5.9)
SAO2 % BLDA: 98 % (ref 94–100)
SAO2 % BLDA: 99 % (ref 94–100)
SODIUM BLDA-SCNC: 135 MMOL/L (ref 136–145)
SODIUM BLDA-SCNC: 139 MMOL/L (ref 136–145)
SODIUM SERPL-SCNC: 137 MMOL/L (ref 136–145)
SODIUM SERPL-SCNC: 141 MMOL/L (ref 136–145)
WBC # BLD AUTO: 11.6 X10*3/UL (ref 4.4–11.3)
WBC # BLD AUTO: 20.4 X10*3/UL (ref 4.4–11.3)

## 2025-05-31 PROCEDURE — 97161 PT EVAL LOW COMPLEX 20 MIN: CPT | Mod: GP

## 2025-05-31 PROCEDURE — 2500000001 HC RX 250 WO HCPCS SELF ADMINISTERED DRUGS (ALT 637 FOR MEDICARE OP): Performed by: NURSE PRACTITIONER

## 2025-05-31 PROCEDURE — 2500000004 HC RX 250 GENERAL PHARMACY W/ HCPCS (ALT 636 FOR OP/ED): Mod: JZ

## 2025-05-31 PROCEDURE — 71045 X-RAY EXAM CHEST 1 VIEW: CPT

## 2025-05-31 PROCEDURE — 2500000002 HC RX 250 W HCPCS SELF ADMINISTERED DRUGS (ALT 637 FOR MEDICARE OP, ALT 636 FOR OP/ED)

## 2025-05-31 PROCEDURE — 82947 ASSAY GLUCOSE BLOOD QUANT: CPT

## 2025-05-31 PROCEDURE — 74018 RADEX ABDOMEN 1 VIEW: CPT

## 2025-05-31 PROCEDURE — 82330 ASSAY OF CALCIUM: CPT | Performed by: NURSE PRACTITIONER

## 2025-05-31 PROCEDURE — 83735 ASSAY OF MAGNESIUM: CPT

## 2025-05-31 PROCEDURE — 99291 CRITICAL CARE FIRST HOUR: CPT | Performed by: ANESTHESIOLOGY

## 2025-05-31 PROCEDURE — 2500000004 HC RX 250 GENERAL PHARMACY W/ HCPCS (ALT 636 FOR OP/ED)

## 2025-05-31 PROCEDURE — 99231 SBSQ HOSP IP/OBS SF/LOW 25: CPT | Performed by: ANESTHESIOLOGY

## 2025-05-31 PROCEDURE — 84132 ASSAY OF SERUM POTASSIUM: CPT

## 2025-05-31 PROCEDURE — 84132 ASSAY OF SERUM POTASSIUM: CPT | Performed by: NURSE PRACTITIONER

## 2025-05-31 PROCEDURE — 85027 COMPLETE CBC AUTOMATED: CPT

## 2025-05-31 PROCEDURE — 82330 ASSAY OF CALCIUM: CPT

## 2025-05-31 PROCEDURE — 2020000001 HC ICU ROOM DAILY

## 2025-05-31 PROCEDURE — 37799 UNLISTED PX VASCULAR SURGERY: CPT

## 2025-05-31 PROCEDURE — 97530 THERAPEUTIC ACTIVITIES: CPT | Mod: GP

## 2025-05-31 PROCEDURE — 82435 ASSAY OF BLOOD CHLORIDE: CPT | Performed by: NURSE PRACTITIONER

## 2025-05-31 PROCEDURE — 83735 ASSAY OF MAGNESIUM: CPT | Performed by: NURSE PRACTITIONER

## 2025-05-31 PROCEDURE — 2500000001 HC RX 250 WO HCPCS SELF ADMINISTERED DRUGS (ALT 637 FOR MEDICARE OP)

## 2025-05-31 PROCEDURE — 37799 UNLISTED PX VASCULAR SURGERY: CPT | Performed by: NURSE PRACTITIONER

## 2025-05-31 PROCEDURE — 82947 ASSAY GLUCOSE BLOOD QUANT: CPT | Performed by: NURSE PRACTITIONER

## 2025-05-31 PROCEDURE — 2500000004 HC RX 250 GENERAL PHARMACY W/ HCPCS (ALT 636 FOR OP/ED): Performed by: NURSE PRACTITIONER

## 2025-05-31 PROCEDURE — 85027 COMPLETE CBC AUTOMATED: CPT | Performed by: NURSE PRACTITIONER

## 2025-05-31 RX ORDER — PROCHLORPERAZINE EDISYLATE 5 MG/ML
10 INJECTION INTRAMUSCULAR; INTRAVENOUS EVERY 6 HOURS PRN
Status: DISCONTINUED | OUTPATIENT
Start: 2025-05-31 | End: 2025-06-03

## 2025-05-31 RX ORDER — ONDANSETRON HYDROCHLORIDE 2 MG/ML
4 INJECTION, SOLUTION INTRAVENOUS ONCE
Status: COMPLETED | OUTPATIENT
Start: 2025-05-31 | End: 2025-05-31

## 2025-05-31 RX ORDER — SIMETHICONE 80 MG
80 TABLET,CHEWABLE ORAL 4 TIMES DAILY PRN
Status: DISCONTINUED | OUTPATIENT
Start: 2025-05-31 | End: 2025-06-05 | Stop reason: HOSPADM

## 2025-05-31 RX ORDER — HYDRALAZINE HYDROCHLORIDE 20 MG/ML
20 INJECTION INTRAMUSCULAR; INTRAVENOUS ONCE
Status: COMPLETED | OUTPATIENT
Start: 2025-05-31 | End: 2025-05-31

## 2025-05-31 RX ORDER — HYDRALAZINE HYDROCHLORIDE 25 MG/1
25 TABLET, FILM COATED ORAL EVERY 8 HOURS
Status: DISCONTINUED | OUTPATIENT
Start: 2025-05-31 | End: 2025-06-01

## 2025-05-31 RX ORDER — PROCHLORPERAZINE EDISYLATE 5 MG/ML
10 INJECTION INTRAMUSCULAR; INTRAVENOUS EVERY 6 HOURS PRN
Status: CANCELLED | OUTPATIENT
Start: 2025-05-31

## 2025-05-31 RX ORDER — HYDRALAZINE HYDROCHLORIDE 20 MG/ML
10 INJECTION INTRAMUSCULAR; INTRAVENOUS ONCE
Status: COMPLETED | OUTPATIENT
Start: 2025-05-31 | End: 2025-05-31

## 2025-05-31 RX ORDER — BISACODYL 10 MG/1
10 SUPPOSITORY RECTAL DAILY
Status: CANCELLED | OUTPATIENT
Start: 2025-05-31

## 2025-05-31 RX ORDER — MAGNESIUM SULFATE HEPTAHYDRATE 40 MG/ML
2 INJECTION, SOLUTION INTRAVENOUS ONCE
Status: COMPLETED | OUTPATIENT
Start: 2025-05-31 | End: 2025-06-01

## 2025-05-31 RX ORDER — INSULIN LISPRO 100 [IU]/ML
0-15 INJECTION, SOLUTION INTRAVENOUS; SUBCUTANEOUS EVERY 4 HOURS
Status: DISCONTINUED | OUTPATIENT
Start: 2025-05-31 | End: 2025-06-01

## 2025-05-31 RX ORDER — PROCHLORPERAZINE 25 MG/1
25 SUPPOSITORY RECTAL EVERY 12 HOURS PRN
Status: CANCELLED | OUTPATIENT
Start: 2025-05-31

## 2025-05-31 RX ORDER — HYDROMORPHONE HYDROCHLORIDE 0.2 MG/ML
0.2 INJECTION INTRAMUSCULAR; INTRAVENOUS; SUBCUTANEOUS EVERY 2 HOUR PRN
Status: DISCONTINUED | OUTPATIENT
Start: 2025-05-31 | End: 2025-06-03

## 2025-05-31 RX ORDER — PROCHLORPERAZINE MALEATE 10 MG
10 TABLET ORAL EVERY 6 HOURS PRN
Status: CANCELLED | OUTPATIENT
Start: 2025-05-31

## 2025-05-31 RX ORDER — FUROSEMIDE 10 MG/ML
20 INJECTION INTRAMUSCULAR; INTRAVENOUS ONCE
Status: COMPLETED | OUTPATIENT
Start: 2025-05-31 | End: 2025-05-31

## 2025-05-31 RX ORDER — METOPROLOL TARTRATE 25 MG/1
25 TABLET, FILM COATED ORAL 2 TIMES DAILY
Status: DISCONTINUED | OUTPATIENT
Start: 2025-05-31 | End: 2025-06-05

## 2025-05-31 RX ORDER — NITROGLYCERIN 20 MG/100ML
0-200 INJECTION INTRAVENOUS CONTINUOUS
Status: DISCONTINUED | OUTPATIENT
Start: 2025-05-31 | End: 2025-06-01

## 2025-05-31 RX ORDER — HYDRALAZINE HYDROCHLORIDE 20 MG/ML
10 INJECTION INTRAMUSCULAR; INTRAVENOUS EVERY 6 HOURS PRN
Status: DISCONTINUED | OUTPATIENT
Start: 2025-05-31 | End: 2025-06-03

## 2025-05-31 RX ORDER — METHOCARBAMOL 100 MG/ML
500 INJECTION, SOLUTION INTRAMUSCULAR; INTRAVENOUS ONCE
Status: COMPLETED | OUTPATIENT
Start: 2025-05-31 | End: 2025-05-31

## 2025-05-31 RX ORDER — INSULIN GLARGINE 100 [IU]/ML
20 INJECTION, SOLUTION SUBCUTANEOUS DAILY
Status: DISCONTINUED | OUTPATIENT
Start: 2025-05-31 | End: 2025-06-05 | Stop reason: HOSPADM

## 2025-05-31 RX ORDER — PROCHLORPERAZINE MALEATE 10 MG
10 TABLET ORAL EVERY 6 HOURS PRN
Status: DISCONTINUED | OUTPATIENT
Start: 2025-05-31 | End: 2025-06-01

## 2025-05-31 RX ADMIN — HYDROMORPHONE HYDROCHLORIDE 0.2 MG: 0.2 INJECTION, SOLUTION INTRAMUSCULAR; INTRAVENOUS; SUBCUTANEOUS at 22:15

## 2025-05-31 RX ADMIN — ONDANSETRON 4 MG: 2 INJECTION INTRAMUSCULAR; INTRAVENOUS at 01:28

## 2025-05-31 RX ADMIN — HYDRALAZINE HYDROCHLORIDE 10 MG: 20 INJECTION INTRAMUSCULAR; INTRAVENOUS at 17:56

## 2025-05-31 RX ADMIN — MAGNESIUM SULFATE HEPTAHYDRATE 2 G: 40 INJECTION, SOLUTION INTRAVENOUS at 23:17

## 2025-05-31 RX ADMIN — INSULIN LISPRO 5 UNITS: 100 INJECTION, SOLUTION INTRAVENOUS; SUBCUTANEOUS at 17:54

## 2025-05-31 RX ADMIN — HEPARIN SODIUM 5000 UNITS: 5000 INJECTION INTRAVENOUS; SUBCUTANEOUS at 06:26

## 2025-05-31 RX ADMIN — POLYETHYLENE GLYCOL 3350 17 G: 17 POWDER, FOR SOLUTION ORAL at 08:34

## 2025-05-31 RX ADMIN — INSULIN LISPRO 5 UNITS: 100 INJECTION, SOLUTION INTRAVENOUS; SUBCUTANEOUS at 14:19

## 2025-05-31 RX ADMIN — METOPROLOL TARTRATE 12.5 MG: 25 TABLET, FILM COATED ORAL at 08:34

## 2025-05-31 RX ADMIN — HYDRALAZINE HYDROCHLORIDE 25 MG: 25 TABLET ORAL at 12:35

## 2025-05-31 RX ADMIN — METHOCARBAMOL 500 MG: 1000 INJECTION, SOLUTION INTRAMUSCULAR; INTRAVENOUS at 19:50

## 2025-05-31 RX ADMIN — ACETAMINOPHEN 650 MG: 325 TABLET ORAL at 13:49

## 2025-05-31 RX ADMIN — HYDROMORPHONE HYDROCHLORIDE 0.4 MG: 1 INJECTION, SOLUTION INTRAMUSCULAR; INTRAVENOUS; SUBCUTANEOUS at 22:52

## 2025-05-31 RX ADMIN — CEFAZOLIN SODIUM 2 G: 2 INJECTION, SOLUTION INTRAVENOUS at 06:26

## 2025-05-31 RX ADMIN — ATORVASTATIN CALCIUM 80 MG: 80 TABLET, FILM COATED ORAL at 22:08

## 2025-05-31 RX ADMIN — SENNOSIDES AND DOCUSATE SODIUM 2 TABLET: 50; 8.6 TABLET ORAL at 22:08

## 2025-05-31 RX ADMIN — FUROSEMIDE 20 MG: 10 INJECTION, SOLUTION INTRAMUSCULAR; INTRAVENOUS at 12:35

## 2025-05-31 RX ADMIN — ASPIRIN 81 MG: 81 TABLET, CHEWABLE ORAL at 08:34

## 2025-05-31 RX ADMIN — HYDRALAZINE HYDROCHLORIDE 10 MG: 20 INJECTION INTRAMUSCULAR; INTRAVENOUS at 00:37

## 2025-05-31 RX ADMIN — HEPARIN SODIUM 5000 UNITS: 5000 INJECTION INTRAVENOUS; SUBCUTANEOUS at 13:49

## 2025-05-31 RX ADMIN — CEFAZOLIN SODIUM 2 G: 2 INJECTION, SOLUTION INTRAVENOUS at 13:49

## 2025-05-31 RX ADMIN — ACETAMINOPHEN 650 MG: 325 TABLET ORAL at 22:08

## 2025-05-31 RX ADMIN — INSULIN LISPRO 5 UNITS: 100 INJECTION, SOLUTION INTRAVENOUS; SUBCUTANEOUS at 22:15

## 2025-05-31 RX ADMIN — OXYCODONE HYDROCHLORIDE 10 MG: 10 TABLET ORAL at 19:02

## 2025-05-31 RX ADMIN — METOPROLOL TARTRATE 25 MG: 25 TABLET, FILM COATED ORAL at 22:08

## 2025-05-31 RX ADMIN — HEPARIN SODIUM 5000 UNITS: 5000 INJECTION INTRAVENOUS; SUBCUTANEOUS at 22:08

## 2025-05-31 RX ADMIN — ACETAMINOPHEN 650 MG: 325 TABLET ORAL at 08:34

## 2025-05-31 RX ADMIN — NITROGLYCERIN 80 MCG/MIN: 20 INJECTION INTRAVENOUS at 02:29

## 2025-05-31 RX ADMIN — POLYETHYLENE GLYCOL 3350 17 G: 17 POWDER, FOR SOLUTION ORAL at 22:08

## 2025-05-31 RX ADMIN — PANTOPRAZOLE SODIUM 40 MG: 40 INJECTION, POWDER, FOR SOLUTION INTRAVENOUS at 06:26

## 2025-05-31 RX ADMIN — ACETAMINOPHEN 650 MG: 325 TABLET ORAL at 02:48

## 2025-05-31 RX ADMIN — INSULIN GLARGINE 20 UNITS: 100 INJECTION, SOLUTION SUBCUTANEOUS at 12:39

## 2025-05-31 RX ADMIN — PROMETHAZINE HYDROCHLORIDE 6.25 MG: 25 INJECTION INTRAMUSCULAR; INTRAVENOUS at 07:46

## 2025-05-31 RX ADMIN — CEFAZOLIN SODIUM 2 G: 2 INJECTION, SOLUTION INTRAVENOUS at 22:08

## 2025-05-31 RX ADMIN — HYDRALAZINE HYDROCHLORIDE 20 MG: 20 INJECTION INTRAMUSCULAR; INTRAVENOUS at 06:26

## 2025-05-31 RX ADMIN — SENNOSIDES AND DOCUSATE SODIUM 2 TABLET: 50; 8.6 TABLET ORAL at 08:34

## 2025-05-31 RX ADMIN — HYDRALAZINE HYDROCHLORIDE 25 MG: 25 TABLET ORAL at 19:49

## 2025-05-31 RX ADMIN — SIMETHICONE 80 MG: 80 TABLET, CHEWABLE ORAL at 22:52

## 2025-05-31 ASSESSMENT — PAIN - FUNCTIONAL ASSESSMENT
PAIN_FUNCTIONAL_ASSESSMENT: 0-10

## 2025-05-31 ASSESSMENT — PAIN SCALES - GENERAL
PAINLEVEL_OUTOF10: 0 - NO PAIN
PAINLEVEL_OUTOF10: 7
PAINLEVEL_OUTOF10: 0 - NO PAIN
PAINLEVEL_OUTOF10: 2

## 2025-05-31 ASSESSMENT — COGNITIVE AND FUNCTIONAL STATUS - GENERAL
MOVING TO AND FROM BED TO CHAIR: A LITTLE
CLIMB 3 TO 5 STEPS WITH RAILING: A LOT
WALKING IN HOSPITAL ROOM: A LOT
TURNING FROM BACK TO SIDE WHILE IN FLAT BAD: A LITTLE
MOVING FROM LYING ON BACK TO SITTING ON SIDE OF FLAT BED WITH BEDRAILS: A LITTLE
MOBILITY SCORE: 16
STANDING UP FROM CHAIR USING ARMS: A LITTLE

## 2025-05-31 ASSESSMENT — ACTIVITIES OF DAILY LIVING (ADL)
ADLS_ADDRESSED: NO
ADL_ASSISTANCE: INDEPENDENT

## 2025-05-31 ASSESSMENT — PAIN DESCRIPTION - LOCATION: LOCATION: CHEST

## 2025-05-31 NOTE — PROGRESS NOTES
Physical Therapy    Physical Therapy Evaluation & Treatment    Patient Name: Franky Sauceda  MRN: [id number]  [place] [facility]  Room: 15/15-A  Today's Date: 5/31/2025   Time Calculation  Start Time: 0937  Stop Time: 1012  Time Calculation (min): 35 min    Assessment/Plan   PT Assessment  PT Assessment Results: Decreased strength, Impaired balance, Decreased endurance, Decreased mobility  Rehab Prognosis: Excellent  Barriers to Discharge Home: No anticipated barriers  Evaluation/Treatment Tolerance: Patient tolerated treatment well, Treatment limited secondary to medical complications (Comment)  Medical Staff Made Aware: Yes  End of Session Communication: Bedside nurse  Assessment Comment: Pt demonstrated ability to complete bed mobility, sit<>stand transfer and bed>chair transfer with thoracic walker.  CGA-minAx1 provided with all mobility.  Limited activity this date d/t (+) orthostatic hypotension with dizziness and nausea.  No instability noted.  End of Session Patient Position: Up in chair, Alarm off, not on at start of session   IP OR SWING BED PT PLAN  Inpatient or Swing Bed: Inpatient  PT Plan  Treatment/Interventions: Bed mobility, Transfer training, Gait training, Stair training, Strengthening, Balance training, Endurance training, Therapeutic exercise, Therapeutic activity  PT Plan: Ongoing PT  PT Frequency: 3 times per week  PT Discharge Recommendations: Low intensity level of continued care  PT Recommended Transfer Status: Assistive device, Contact guard  PT - OK to Discharge: Yes    Subjective     PT Visit Info:  PT Received On: 05/31/25  General Visit Information:  General  Reason for Referral: AVR, CABG x3, BRENT clip, MAZE, posterior pericardial window.  Past Medical History Relevant to Rehab: NIDDM, HTN, atypical atrial flutter, paroxysmal atrial fibrillation, CAD, and TIA  Family/Caregiver Present: No  Prior to Session Communication: Bedside nurse  Patient Position Received: Bed, 3 rail up,  Alarm off, not on at start of session  Preferred Learning Style: auditory, verbal, visual, written  General Comment: Pt awake, alert and willing to participate in PT session  Home Living:  Home Living  Type of Home: House  Lives With: Spouse  Home Adaptive Equipment: Walker rolling or standard  Home Layout:  (2 JOANNE with railing, bed/bath - 2nd floor with railing, both types of showers with GB and shower chair)  Prior Level of Function:  Prior Function Per Pt/Caregiver Report  Level of Wood: Independent with ADLs and functional transfers  ADL Assistance: Independent  Homemaking Assistance: Independent  Ambulatory Assistance: Independent  Vocational: Retired  Prior Function Comments: (+) drives, (-) falls  Precautions:  Precautions  Hearing/Visual Limitations: WFL  Medical Precautions: Cardiac precautions, Fall precautions, Oxygen therapy device and L/min, Chest tube (3L, CT x2)  Post-Surgical Precautions: Move in the Tube  Precautions Comment: MAP 70-90, SpO2 >92%, VVI @ 35     Date/Time Vitals Session Patient Position Pulse Resp SpO2 BP MAP (mmHg)    05/31/25 0937 Pre PT  Lying  74  20  94 %  141/70  90     05/31/25 1012 Post PT  Sitting  76  24  93 %  108/57  70      Vital Signs Comment: -180s sitting EOB however upon standing, SBP dropped to low 100s with reported dizziness and nausea.  BP and symptoms improved once seated.  RN aware.    Objective   Pain:  Pain Assessment  Pain Assessment: 0-10  0-10 (Numeric) Pain Score: 2  Pain Type: Surgical pain  Pain Location: Chest  Effect of Pain on Daily Activities: Remained stable with mobility  Cognition:  Cognition  Overall Cognitive Status: Within Functional Limits  Orientation Level: Oriented X4    General Assessments:  General Observation  General Observation: Tele, angelita, CVP, CVC, mraie, nitro 60     Activity Tolerance  Endurance: Tolerates 10 - 20 min exercise with multiple rests  Early Mobility/Exercise Safety Screen: Proceed with mobilization - No  exclusion criteria met  Activity Tolerance Comments: Limited activity d/t (+) orthostatic hypotension with nausea and lightheadedness    Sensation  Light Touch: No apparent deficits (Post-op)    Strength  Strength Comments: BLEs at least 3/5 shown through functional mobility  Coordination  Movements are Fluid and Coordinated: Yes    Postural Control  Postural Control: Within Functional Limits    Static Sitting Balance  Static Sitting-Balance Support: Bilateral upper extremity supported, Feet supported  Static Sitting-Level of Assistance: Close supervision    Static Standing Balance  Static Standing-Balance Support: Bilateral upper extremity supported  Static Standing-Level of Assistance: Contact guard  Static Standing-Comment/Number of Minutes: Thoracic walker for upright support  Functional Assessments:  ADL  ADL's Addressed: No    Bed Mobility  Bed Mobility: Yes  Bed Mobility 1  Bed Mobility 1: Supine to sitting  Level of Assistance 1: Minimum assistance  Bed Mobility Comments 1: HOB elevated, assistance with advancing trunk upright    Transfers  Transfer: Yes  Transfer 1  Transfer From 1: Sit to, Stand to  Transfer to 1: Sit, Stand  Technique 1: Sit to stand, Stand to sit  Transfer Device 1: Thoracic walker  Transfer Level of Assistance 1: Contact guard  Trials/Comments 1: Cues for hand placement and proper use of AD  Transfers 2  Transfer From 2: Bed to  Transfer to 2: Chair with arms  Technique 2: Lateral, To left  Transfer Device 2: Thoracic walker  Transfer Level of Assistance 2: Contact guard  Trials/Comments 2: ~4-5 lateral steps to complete transfer    Ambulation/Gait Training  Ambulation/Gait Training Performed: No (Unable to complete d/t orthostatic hypotension with (+) symptoms. Able to complete transfer to chair however.)    Stairs  Stairs: No  Extremity/Trunk Assessments:  RLE   RLE : Within Functional Limits  LLE   LLE : Within Functional Limits  Treatments:  Therapeutic Exercise  Therapeutic  Exercise Performed: Yes  Therapeutic Exercise Activity 1: 1x5 reps of incentive spirometer: </=1000    Therapeutic Activity  Therapeutic Activity Performed: Yes  Therapeutic Activity 1: Increased time for skilled ICU line/tube management for safe functional mobility.  Therapeutic Activity 2: Education: MITT (handout provided and reviewed), use of AD, use of IS  Therapeutic Activity 3: x2 sit<>stand transfers from EOB, thoracic walker, CGA.  Pt sat EOB ~10 min during session demonstrating good seated posture and no instability, vitals stable.  Static standing ~4 min with thoracic walker, CGA however noted SBP drop with (+) symptoms.  Pt reported nausea and requested to return to sitting.  Pt declined attempted mobility.  Outcome Measures:  [facility] Basic Mobility  Turning from your back to your side while in a flat bed without using bedrails: A little  Moving from lying on your back to sitting on the side of a flat bed without using bedrails: A little  Moving to and from bed to chair (including a wheelchair): A little  Standing up from a chair using your arms (e.g. wheelchair or bedside chair): A little  To walk in hospital room: A lot  Climbing 3-5 steps with railing: A lot  Basic Mobility - Total Score: 16    FSS-ICU  Ambulation: Unable to attempt due to weakness  Rolling: Supervision or set-up only  Sitting: Supervision or set-up only  Transfer Sit-to-Stand: Supervision or set-up only  Transfer Supine-to-Sit: Minimal assistance (performs 75% or more of task)  Total Score: 19    Early Mobility/Exercise Safety Screen: Proceed with mobilization - No exclusion criteria met  ICU Mobility Scale: Transferring bed to chair [5]    Encounter Problems       Encounter Problems (Active)       Balance       Pt will demonstrated ability to score at least 24/28 on the Tinetti balance assessment tool to ensure safety upon D/C.  (Progressing)       Start:  05/31/25    Expected End:  06/14/25               Mobility       Pt will  demonstrated ability to ambulate >/=300ft with proper form, LRAD and no balance deficits for safe home going.   (Progressing)       Start:  05/31/25    Expected End:  06/14/25            Pt will demonstrate ability to ascend/descend 2-13 stairs with unilateral rail and no balance deficits for safe home going.  (Progressing)       Start:  05/31/25    Expected End:  06/14/25               PT Transfers       Pt will demonstrate ability to complete 5X STS in < 12 sec with good from and consistency between transfers for safe D/C.  (Progressing)       Start:  05/31/25    Expected End:  06/14/25                   Education Documentation  Handouts, taught by Sofiya Mcintyre PT at 5/31/2025 11:11 AM.  Learner: Patient  Readiness: Acceptance  Method: Explanation, Demonstration, Handout  Response: Demonstrated Understanding, Verbalizes Understanding  Comment: Refer to note for additional information    Precautions, taught by Sofiya Mcintyre PT at 5/31/2025 11:11 AM.  Learner: Patient  Readiness: Acceptance  Method: Explanation, Demonstration, Handout  Response: Demonstrated Understanding, Verbalizes Understanding  Comment: Refer to note for additional information    Body Mechanics, taught by Sofiya Mcintyre PT at 5/31/2025 11:11 AM.  Learner: Patient  Readiness: Acceptance  Method: Explanation, Demonstration, Handout  Response: Demonstrated Understanding, Verbalizes Understanding  Comment: Refer to note for additional information    Mobility Training, taught by Sofiya Mcintyre PT at 5/31/2025 11:11 AM.  Learner: Patient  Readiness: Acceptance  Method: Explanation, Demonstration, Handout  Response: Demonstrated Understanding, Verbalizes Understanding  Comment: Refer to note for additional information    Education Comments  No comments found.

## 2025-05-31 NOTE — PROGRESS NOTES
"CTICU Progress Note  Franky Sauceda/[id number]    Admit Date: 5/30/2025  Hospital Length of Stay: 1   ICU Length of Stay: 18h   CT SURGEON:     SUBJECTIVE:   Overnight, able to wean insulin gtt to 3u/hr and wean nitroglycerin to 80. He required 30 mg hydral    MEDICATIONS  Infusions:  insulin regular infusion for cardiac surgery, Last Rate: 3 Units/hr (05/31/25 0800)  lactated Ringer's, Last Rate: Stopped (05/30/25 2200)  lactated Ringer's, Last Rate: 5 mL/hr (05/31/25 0800)  nitroglycerin, Last Rate: 60 mcg/min (05/31/25 0800)      Scheduled:  acetaminophen, 650 mg, q6h  aspirin, 81 mg, Daily  atorvastatin, 80 mg, Nightly  ceFAZolin, 2 g, q8h  heparin (porcine), 5,000 Units, q8h  [Held by provider] insulin lispro, 0-15 Units, q4h  metoprolol tartrate, 12.5 mg, BID  pantoprazole, 40 mg, Daily before breakfast   Or  pantoprazole, 40 mg, Daily before breakfast  polyethylene glycol, 17 g, BID  sennosides-docusate sodium, 2 tablet, BID      PRN:  calcium gluconate, 1 g, q6h PRN  calcium gluconate, 2 g, q6h PRN  dextrose, 25 g, q15 min PRN   Or  glucagon, 1 mg, q15 min PRN  HYDROmorphone, 0.2 mg, q15 min PRN  insulin regular, 0-15 Units, PRN  magnesium sulfate, 2 g, q6h PRN  magnesium sulfate, 4 g, q6h PRN  naloxone, 0.2 mg, q5 min PRN  ondansetron, 4 mg, q8h PRN   Or  ondansetron, 4 mg, q8h PRN  oxyCODONE, 10 mg, q4h PRN  oxyCODONE, 5 mg, q4h PRN  oxygen, , Continuous PRN - O2/gases  potassium chloride CR, 20 mEq, q6h PRN   Or  potassium chloride, 20 mEq, q6h PRN  potassium chloride CR, 40 mEq, q6h PRN   Or  potassium chloride, 40 mEq, q6h PRN  potassium chloride, 20 mEq, q6h PRN  potassium chloride, 40 mEq, q6h PRN        PHYSICAL EXAM:   Visit Vitals  /55 (BP Location: Left arm, Patient Position: Lying)   Pulse 74   Temp 37.5 °C (99.5 °F)   Resp 18   Ht 1.753 m (5' 9\")   Wt 105 kg (230 lb 8.9 oz)   SpO2 93%   BMI 34.05 kg/m²   Smoking Status Never   BSA 2.26 m²     Wt Readings from Last 5 Encounters:   05/30/25 " 105 kg (230 lb 8.9 oz)   05/23/25 107 kg (235 lb)   05/21/25 107 kg (235 lb)   05/09/25 104 kg (230 lb)   04/21/25 106 kg (234 lb 6.4 oz)     INTAKE/OUTPUT:  I/O last 3 completed shifts:  In: 8347.5 (79.8 mL/kg) [I.V.:1284.1 (12.3 mL/kg); Blood:742; NG/GT:220; IV Piggyback:6101.4]  Out: 2015 (19.3 mL/kg) [Urine:1235 (0.3 mL/kg/hr); Other:200; Blood:250; Chest Tube:330]  Weight: 104.6 kg          Vent settings:  Vent Mode: Pressure support  FiO2 (%):  [36 %-50 %] 36 %  S RR:  [16] 16  S VT:  [560 mL] 560 mL  PEEP/CPAP (cm H2O):  [0 cm H20-8 cm H20] 5 cm H20  VA SUP:  [0 cm H20-5 cm H20] 5 cm H20  MAP (cm H2O):  [12] 12    Physical Exam:   - CONSTITUTION: Adult male in ICU bed, NAD  - NEUROLOGIC: A and O x3, JORGENSEN spontaneously, strength and sensation intact  - CARDIOVASCULAR: NSR with frequent PVCs.  Normotensive with nitroglycerin drip at 60  - RESPIRATORY: On NC 4L.  Equal breath sounds  - GI: abdomen slightly distended, decreased BS, non tender  - : Angelo with tea colored urine  - EXTREMITIES: mild edema in upper and lower   - SKIN: intact, dry,   - PSYCHIATRIC: normal behavior    Daily Risk Screen  Intubated: no  Central line: Hemodynamic instability requiring parenteral medication   Angelo: Accurate I/Os in critically ill patient    Images:     Invasive Hemodynamics:    Most Recent Range Past 24hrs   BP (Art) 120/63 Arterial Line BP 1  Min: 105/54  Max: 144/68   MAP(Art) 79 mmHg Arterial Line MAP 1 (mmHg)   Min: 70 mmHg  Max: 99 mmHg   RA/CVP   No data recorded   PA   No data recorded   PA(mean)   No data recorded   CO   No data recorded   CI   No data recorded   Mixed Venous   No data recorded   SVR    No data recorded         Assessment/Plan   75yo male with PMHof NIDDM, HTN, atypical atrial flutter, paroxysmal atrial fibrillation, CAD, and TIA who now presents s/p AVR, CABG x3, BRENT clip, MAZE, posterior pericardial window.      Plan:  NEURO:  PMH of TIA without residual deficits.  Acute post operative pain,  received single shot bilateral MOON blocks and methadone in OR.  Pain well controlled--mostly chest tube related -->  - Serial neuro and pain assessments   - Scheduled Tylenol   - PRN oxycodone  - PRN dilaudid for pain   - PT Consult, OOB to chair as tolerated, chair position if not tolerated   - CAM ICU score qshift  - Sleep/wake cycle hygiene  - home ASA/statnn     CV:  Patient has a history of HTN, atypical atrial flutter, paroxysmal atrial fibrillation, CAD and is now status post AVR, CABG x3, BRENT clip, MAZE, posterior pericardial window. Pre/Post EF: NML. Arrived to CTICU on epi 0.04 mcg/kg/min and levo 0.04 mcg/kg/min for hypotension. Currently not on vasopressors or inotrope.  Hypovolemic with lactic acidosis. NSR/ sinus americo with frequent PVCs. V epicardial wires set VVI @ 35.-->  - Maintain goal MAP 70-90  - Volume resuscitate as clinically indicated  - Maintain epicardial wires set VVI @ 35, pressure not improved with pacing VVI higher  - ASA  - statin  - Start Metoprolol 12.5 BID   - Start hydral 25 mg q8 hr (required 30 mg overnight)  - Wean nitroglycerin   - Standing weight  - Hold home dilatiazem ER, Losartan, taking home ASA, statin. Home metop     PULM:  No history of pulmonary disease.  Extubated. Chest tubes with L pleural and mediastinal still with serosang output. Robust oxygenation with appropriate ventilation. On 4 L NC-->  - Daily CXR with edema, pleural eff   - Wean FiO2 maintaining SpO2 >92%.   - IS q1h and OOB to chair when extubated  - Chest tubes to wall suction.      GI:  PMH of GERD on home PPI.-->  - Continue home PPI  - Passed swallow, reg diet  - Colace/senna BID and miralax BID.  Add suppository if no BM by PM     :  CSA-JO Risk Score low.  No history of renal disease, baseline creatinine 1-1.4. Creatinine stable post-op. Angelo in place and making adequate UOP but clinically appears hypovolemic.   Post op JO 2/2 hypovolemia, Resolving  Lactic acidosis, resolving. -->  - Continue  marie catheter for strict I/Os.  - Goal UOP 0.5ml/kg/hr  - RFP as clinically indicated  - Replete electrolytes per CTICU protocol  - diurese to even     ENDO:  PMH of NIDDM. A1c: 7. Stress hyperglycemia managed on insulin infusion. -->  - Maintain BG <180, insulin per CTICU protocol. Continuing insulin infusion for now  - stop insulin gtt  - start 20 lantus and cardiac SSI #3  - Hold home semaglutide and metformin     HEME:  Acute blood loss anemia and thrombocytopenia with acceptable chest tube output.-->    - Monitor drain output volume and characteristics  - CBC, coags, and fibrinogen post op and as clinically indicated  - Start ASA 6hrs post-op for CABG  - SQH  - SCDs for DVT prophylaxis.  - Last type and screen: 5/30  - hold home Eliquis for afib/flutter for now     ID:  Afebrile, no current indications of infection. MRSA negative. -->  - Trend temp q4h  - Periop cefazolin x 48hrs     Skin:  No active skin issues.  - preventative Mepilex dressings in place on sacrum and heels  - change preventative Mepilex weekly or more frequently as indicated (when moist/soiled)   - every shift skin assessment per nursing and weekly ICU skin rounds  - moisture barrier to be applied with errol care  - active skin problems addressed with nursing on daily rounds     Proph:  SCDs  PPI  SQH     Right IJ MAC w Minimac placed 5/30  Left brachial a-line placed 5/30  PIV     F: Family: will update at bedside    Restraints: none    Code status: Full Code  Emergency contact: Extended Emergency Contact Information  Primary Emergency Contact: ALTA SANCHEZ  Address: [address] [place] [id number] [place] [facility]  Mobile Phone: [phone]  Relation: Spouse     A,B,C,D,E,F,G: reviewed    Discussed with Dr. Ellison    Dispo: CTICU care for now.  Possible transfer to LT3 tomorrow    Cathy August DO  CTICU TEAM PHONE [phone]

## 2025-05-31 NOTE — PROGRESS NOTES
Postop Pain HPI -   Palliative: relieved with IV analgesics and regional local anesthetics  Provocative: movement  Quality:  burning and aching  Radiation:  none  Severity:  1-2/10  Timing: constant    24-HOUR OPIOID CONSUMPTION:  Oxycodone 10 mg  Dilaudid 0.2 mg    Scheduled medications  Scheduled Medications[1]  Continuous medications  Continuous Medications[2]  PRN medications  PRN Medications[3]     Physical Exam:  Constitutional:  no distress, alert and cooperative  Eyes: clear sclera  Head/Neck: No apparent injury, trachea midline  Respiratory/Thorax: Patent airways, thorax symmetric, breathing comfortably  Cardiovascular: no pitting edema  Gastrointestinal: Nondistended  Musculoskeletal: ROM intact  Extremities: no clubbing  Neurological: alert, gamboa x4  Psychological: Appropriate affect    Results for orders placed or performed during the hospital encounter of 05/30/25 (from the past 24 hours)   ACTIVATED CLOTTING TIME HIGH   Result Value Ref Range    POCT Activated Clotting Time High Range 118 82 - 174 sec   Blood Gas Arterial Full Panel Unsolicited   Result Value Ref Range    POCT pH, Arterial 7.32 (L) 7.38 - 7.42 pH    POCT pCO2, Arterial 40 38 - 42 mm Hg    POCT pO2, Arterial 124 (H) 85 - 95 mm Hg    POCT SO2, Arterial 99 94 - 100 %    POCT Oxy Hemoglobin, Arterial 97.2 94.0 - 98.0 %    POCT Hematocrit Calculated, Arterial 29.0 (L) 41.0 - 52.0 %    POCT Sodium, Arterial 138 136 - 145 mmol/L    POCT Potassium, Arterial 4.2 3.5 - 5.3 mmol/L    POCT Chloride, Arterial 111 (H) 98 - 107 mmol/L    POCT Ionized Calcium, Arterial 1.32 1.10 - 1.33 mmol/L    POCT Glucose, Arterial 142 (H) 74 - 99 mg/dL    POCT Lactate, Arterial 2.4 (H) 0.4 - 2.0 mmol/L    POCT Base Excess, Arterial -5.1 (L) -2.0 - 3.0 mmol/L    POCT HCO3 Calculated, Arterial 20.6 (L) 22.0 - 26.0 mmol/L    POCT Hemoglobin, Arterial 9.6 (L) 13.5 - 17.5 g/dL    POCT Anion Gap, Arterial 11 10 - 25 mmo/L    Patient Temperature 37.0 degrees Celsius     FiO2 100 %   Coox Panel, Arterial Unsolicited   Result Value Ref Range    POCT Hemoglobin, Arterial 9.6 (L) 13.5 - 17.5 g/dL    POCT Oxy Hemoglobin, Arterial 97.2 94.0 - 98.0 %    POCT Carboxyhemoglobin, Arterial 0.8 %    POCT Methemoglobin, Arterial 1.3 0.0 - 1.5 %    POCT Deoxy Hemoglobin, Arterial 0.7 0.0 - 5.0 %   Blood Gas Arterial Full Panel Unsolicited   Result Value Ref Range    POCT pH, Arterial 7.34 (L) 7.38 - 7.42 pH    POCT pCO2, Arterial 37 (L) 38 - 42 mm Hg    POCT pO2, Arterial 176 (H) 85 - 95 mm Hg    POCT SO2, Arterial 99 94 - 100 %    POCT Oxy Hemoglobin, Arterial 98.1 (H) 94.0 - 98.0 %    POCT Hematocrit Calculated, Arterial 33.0 (L) 41.0 - 52.0 %    POCT Sodium, Arterial 138 136 - 145 mmol/L    POCT Potassium, Arterial 4.3 3.5 - 5.3 mmol/L    POCT Chloride, Arterial 111 (H) 98 - 107 mmol/L    POCT Ionized Calcium, Arterial 1.23 1.10 - 1.33 mmol/L    POCT Glucose, Arterial 147 (H) 74 - 99 mg/dL    POCT Lactate, Arterial 2.9 (H) 0.4 - 2.0 mmol/L    POCT Base Excess, Arterial -5.3 (L) -2.0 - 3.0 mmol/L    POCT HCO3 Calculated, Arterial 20.0 (L) 22.0 - 26.0 mmol/L    POCT Hemoglobin, Arterial 11.0 (L) 13.5 - 17.5 g/dL    POCT Anion Gap, Arterial 11 10 - 25 mmo/L    Patient Temperature 37.0 degrees Celsius    FiO2 90 %   Coox Panel, Arterial Unsolicited   Result Value Ref Range    POCT Hemoglobin, Arterial 11.0 (L) 13.5 - 17.5 g/dL    POCT Oxy Hemoglobin, Arterial 98.1 (H) 94.0 - 98.0 %    POCT Carboxyhemoglobin, Arterial 0.4 %    POCT Methemoglobin, Arterial 0.7 0.0 - 1.5 %    POCT Deoxy Hemoglobin, Arterial 0.8 0.0 - 5.0 %   Blood Gas Arterial Full Panel Unsolicited   Result Value Ref Range    POCT pH, Arterial 7.35 (L) 7.38 - 7.42 pH    POCT pCO2, Arterial 37 (L) 38 - 42 mm Hg    POCT pO2, Arterial 227 (H) 85 - 95 mm Hg    POCT SO2, Arterial 99 94 - 100 %    POCT Oxy Hemoglobin, Arterial 98.1 (H) 94.0 - 98.0 %    POCT Hematocrit Calculated, Arterial 32.0 (L) 41.0 - 52.0 %    POCT Sodium, Arterial  139 136 - 145 mmol/L    POCT Potassium, Arterial 4.0 3.5 - 5.3 mmol/L    POCT Chloride, Arterial 111 (H) 98 - 107 mmol/L    POCT Ionized Calcium, Arterial 1.16 1.10 - 1.33 mmol/L    POCT Glucose, Arterial 150 (H) 74 - 99 mg/dL    POCT Lactate, Arterial 3.5 (H) 0.4 - 2.0 mmol/L    POCT Base Excess, Arterial -4.7 (L) -2.0 - 3.0 mmol/L    POCT HCO3 Calculated, Arterial 20.4 (L) 22.0 - 26.0 mmol/L    POCT Hemoglobin, Arterial 10.6 (L) 13.5 - 17.5 g/dL    POCT Anion Gap, Arterial 12 10 - 25 mmo/L    Patient Temperature 37.0 degrees Celsius    FiO2 100 %   Magnesium   Result Value Ref Range    Magnesium 3.37 (H) 1.60 - 2.40 mg/dL   Coagulation Screen   Result Value Ref Range    Protime 12.9 (H) 9.8 - 12.4 seconds    INR 1.2 (H) 0.9 - 1.1    aPTT 27 26 - 36 seconds   Fibrinogen   Result Value Ref Range    Fibrinogen 246 200 - 400 mg/dL   CBC   Result Value Ref Range    WBC 18.5 (H) 4.4 - 11.3 x10*3/uL    nRBC 0.0 0.0 - 0.0 /100 WBCs    RBC 3.49 (L) 4.50 - 5.90 x10*6/uL    Hemoglobin 10.7 (L) 13.5 - 17.5 g/dL    Hematocrit 32.9 (L) 41.0 - 52.0 %    MCV 94 80 - 100 fL    MCH 30.7 26.0 - 34.0 pg    MCHC 32.5 32.0 - 36.0 g/dL    RDW 13.7 11.5 - 14.5 %    Platelets 141 (L) 150 - 450 x10*3/uL   Renal Function Panel   Result Value Ref Range    Glucose 164 (H) 74 - 99 mg/dL    Sodium 144 136 - 145 mmol/L    Potassium 4.1 3.5 - 5.3 mmol/L    Chloride 110 (H) 98 - 107 mmol/L    Bicarbonate 20 (L) 21 - 32 mmol/L    Anion Gap 18 10 - 20 mmol/L    Urea Nitrogen 22 6 - 23 mg/dL    Creatinine 1.61 (H) 0.50 - 1.30 mg/dL    eGFR 44 (L) >60 mL/min/1.73m*2    Calcium 8.1 (L) 8.6 - 10.6 mg/dL    Phosphorus 2.6 2.5 - 4.9 mg/dL    Albumin 3.7 3.4 - 5.0 g/dL   Blood Gas Arterial Full Panel   Result Value Ref Range    POCT pH, Arterial 7.30 (L) 7.38 - 7.42 pH    POCT pCO2, Arterial 40 38 - 42 mm Hg    POCT pO2, Arterial 97 (H) 85 - 95 mm Hg    POCT SO2, Arterial 99 94 - 100 %    POCT Oxy Hemoglobin, Arterial 95.8 94.0 - 98.0 %    POCT Hematocrit  Calculated, Arterial 34.0 (L) 41.0 - 52.0 %    POCT Sodium, Arterial 139 136 - 145 mmol/L    POCT Potassium, Arterial 4.2 3.5 - 5.3 mmol/L    POCT Chloride, Arterial 108 (H) 98 - 107 mmol/L    POCT Ionized Calcium, Arterial 1.18 1.10 - 1.33 mmol/L    POCT Glucose, Arterial 153 (H) 74 - 99 mg/dL    POCT Lactate, Arterial 4.1 (HH) 0.4 - 2.0 mmol/L    POCT Base Excess, Arterial -6.3 (L) -2.0 - 3.0 mmol/L    POCT HCO3 Calculated, Arterial 19.7 (L) 22.0 - 26.0 mmol/L    POCT Hemoglobin, Arterial 11.4 (L) 13.5 - 17.5 g/dL    POCT Anion Gap, Arterial 16 10 - 25 mmo/L    Patient Temperature 37.0 degrees Celsius    FiO2 50 %   POCT GLUCOSE   Result Value Ref Range    POCT Glucose 125 (H) 74 - 99 mg/dL   Blood Gas Arterial Full Panel   Result Value Ref Range    POCT pH, Arterial 7.30 (L) 7.38 - 7.42 pH    POCT pCO2, Arterial 35 (L) 38 - 42 mm Hg    POCT pO2, Arterial 96 (H) 85 - 95 mm Hg    POCT SO2, Arterial 98 94 - 100 %    POCT Oxy Hemoglobin, Arterial 95.9 94.0 - 98.0 %    POCT Hematocrit Calculated, Arterial 35.0 (L) 41.0 - 52.0 %    POCT Sodium, Arterial 138 136 - 145 mmol/L    POCT Potassium, Arterial 4.2 3.5 - 5.3 mmol/L    POCT Chloride, Arterial 109 (H) 98 - 107 mmol/L    POCT Ionized Calcium, Arterial 1.13 1.10 - 1.33 mmol/L    POCT Glucose, Arterial 229 (H) 74 - 99 mg/dL    POCT Lactate, Arterial 5.0 (HH) 0.4 - 2.0 mmol/L    POCT Base Excess, Arterial -8.4 (L) -2.0 - 3.0 mmol/L    POCT HCO3 Calculated, Arterial 17.2 (L) 22.0 - 26.0 mmol/L    POCT Hemoglobin, Arterial 11.5 (L) 13.5 - 17.5 g/dL    POCT Anion Gap, Arterial 16 10 - 25 mmo/L    Patient Temperature 37.0 degrees Celsius    FiO2 40 %   Blood Gas Arterial Full Panel   Result Value Ref Range    POCT pH, Arterial 7.39 7.38 - 7.42 pH    POCT pCO2, Arterial 29 (L) 38 - 42 mm Hg    POCT pO2, Arterial 69 (L) 85 - 95 mm Hg    POCT SO2, Arterial 97 94 - 100 %    POCT Oxy Hemoglobin, Arterial 95.0 94.0 - 98.0 %    POCT Hematocrit Calculated, Arterial 31.0 (L) 41.0  - 52.0 %    POCT Sodium, Arterial 138 136 - 145 mmol/L    POCT Potassium, Arterial 4.6 3.5 - 5.3 mmol/L    POCT Chloride, Arterial 110 (H) 98 - 107 mmol/L    POCT Ionized Calcium, Arterial 1.14 1.10 - 1.33 mmol/L    POCT Glucose, Arterial 219 (H) 74 - 99 mg/dL    POCT Lactate, Arterial 3.8 (H) 0.4 - 2.0 mmol/L    POCT Base Excess, Arterial -6.4 (L) -2.0 - 3.0 mmol/L    POCT HCO3 Calculated, Arterial 17.6 (L) 22.0 - 26.0 mmol/L    POCT Hemoglobin, Arterial 10.2 (L) 13.5 - 17.5 g/dL    POCT Anion Gap, Arterial 15 10 - 25 mmo/L    Patient Temperature 37.0 degrees Celsius    FiO2 37 %   POCT GLUCOSE   Result Value Ref Range    POCT Glucose 233 (H) 74 - 99 mg/dL   POCT GLUCOSE   Result Value Ref Range    POCT Glucose 198 (H) 74 - 99 mg/dL   CBC   Result Value Ref Range    WBC 11.6 (H) 4.4 - 11.3 x10*3/uL    nRBC 0.0 0.0 - 0.0 /100 WBCs    RBC 3.23 (L) 4.50 - 5.90 x10*6/uL    Hemoglobin 9.9 (L) 13.5 - 17.5 g/dL    Hematocrit 31.1 (L) 41.0 - 52.0 %    MCV 96 80 - 100 fL    MCH 30.7 26.0 - 34.0 pg    MCHC 31.8 (L) 32.0 - 36.0 g/dL    RDW 14.0 11.5 - 14.5 %    Platelets 111 (L) 150 - 450 x10*3/uL   Calcium, Ionized   Result Value Ref Range    POCT Calcium, Ionized 1.20 1.1 - 1.33 mmol/L   Magnesium   Result Value Ref Range    Magnesium 2.70 (H) 1.60 - 2.40 mg/dL   Renal Function Panel   Result Value Ref Range    Glucose 168 (H) 74 - 99 mg/dL    Sodium 141 136 - 145 mmol/L    Potassium 4.6 3.5 - 5.3 mmol/L    Chloride 110 (H) 98 - 107 mmol/L    Bicarbonate 22 21 - 32 mmol/L    Anion Gap 14 10 - 20 mmol/L    Urea Nitrogen 26 (H) 6 - 23 mg/dL    Creatinine 1.47 (H) 0.50 - 1.30 mg/dL    eGFR 49 (L) >60 mL/min/1.73m*2    Calcium 8.5 (L) 8.6 - 10.6 mg/dL    Phosphorus 2.7 2.5 - 4.9 mg/dL    Albumin 3.5 3.4 - 5.0 g/dL   POCT GLUCOSE   Result Value Ref Range    POCT Glucose 175 (H) 74 - 99 mg/dL   Blood Gas Arterial Full Panel   Result Value Ref Range    POCT pH, Arterial 7.35 (L) 7.38 - 7.42 pH    POCT pCO2, Arterial 35 (L) 38 - 42  mm Hg    POCT pO2, Arterial 98 (H) 85 - 95 mm Hg    POCT SO2, Arterial 99 94 - 100 %    POCT Oxy Hemoglobin, Arterial 96.6 94.0 - 98.0 %    POCT Hematocrit Calculated, Arterial 29.0 (L) 41.0 - 52.0 %    POCT Sodium, Arterial 139 136 - 145 mmol/L    POCT Potassium, Arterial 4.1 3.5 - 5.3 mmol/L    POCT Chloride, Arterial 112 (H) 98 - 107 mmol/L    POCT Ionized Calcium, Arterial 1.14 1.10 - 1.33 mmol/L    POCT Glucose, Arterial 144 (H) 74 - 99 mg/dL    POCT Lactate, Arterial 1.2 0.4 - 2.0 mmol/L    POCT Base Excess, Arterial -5.7 (L) -2.0 - 3.0 mmol/L    POCT HCO3 Calculated, Arterial 19.3 (L) 22.0 - 26.0 mmol/L    POCT Hemoglobin, Arterial 9.6 (L) 13.5 - 17.5 g/dL    POCT Anion Gap, Arterial 12 10 - 25 mmo/L    Patient Temperature 37.0 degrees Celsius    FiO2 36 %   POCT GLUCOSE   Result Value Ref Range    POCT Glucose 139 (H) 74 - 99 mg/dL   POCT GLUCOSE   Result Value Ref Range    POCT Glucose 136 (H) 74 - 99 mg/dL   POCT GLUCOSE   Result Value Ref Range    POCT Glucose 163 (H) 74 - 99 mg/dL   POCT GLUCOSE   Result Value Ref Range    POCT Glucose 181 (H) 74 - 99 mg/dL       Plan:     - Bilateral single shot serratus anterior blocks performed pre-operatively 05/30/25  - Pt's pain well controlled. Nerve block now worn off  - Pain medications per primary team    Acute pain will sign off. Please call back if questions     Acute Pain Resident  pg [phone] ph [phone]          [1] acetaminophen, 650 mg, oral, q6h  aspirin, 81 mg, oral, Daily  atorvastatin, 80 mg, oral, Nightly  ceFAZolin, 2 g, intravenous, q8h  furosemide, 20 mg, intravenous, Once  heparin (porcine), 5,000 Units, subcutaneous, q8h  hydrALAZINE, 25 mg, oral, q8h  insulin glargine, 20 Units, subcutaneous, Daily  [Held by provider] insulin lispro, 0-15 Units, subcutaneous, q4h  insulin lispro, 0-15 Units, subcutaneous, q4h  metoprolol tartrate, 25 mg, oral, BID  pantoprazole, 40 mg, oral, Daily before breakfast   Or  pantoprazole, 40 mg, intravenous, Daily before  breakfast  polyethylene glycol, 17 g, oral, BID  sennosides-docusate sodium, 2 tablet, oral, BID  [2] insulin regular infusion for cardiac surgery, 0-15 Units/hr, Last Rate: 4 Units/hr (05/31/25 1100)  lactated Ringer's, 5 mL/hr, Last Rate: 5 mL/hr (05/31/25 1100)  nitroglycerin, 0-200 mcg/min  [3] PRN medications: calcium gluconate, calcium gluconate, dextrose **OR** glucagon, HYDROmorphone, insulin regular, magnesium sulfate, magnesium sulfate, naloxone, ondansetron **OR** ondansetron, oxyCODONE, oxyCODONE, oxygen, potassium chloride CR **OR** potassium chloride, potassium chloride CR **OR** potassium chloride, potassium chloride, potassium chloride

## 2025-05-31 NOTE — CARE PLAN
The patient's goals for the shift include  remain pain free and cure nausea    The clinical goals for the shift include  patient will remain HDS, pain free, and reduce nausea symptoms.    Over the shift, the patient did not make progress toward the following goals. Barriers to progression include pt was HDS although required medication additions to control BP within proper parameters, patient experienced some pain with coughing and vomiting but pain quickly resided. Recommendations to address these barriers include: monitor VS, lab levels, and pain thresholds.  Ambulate as tolerated and promote independence.

## 2025-06-01 ENCOUNTER — APPOINTMENT (OUTPATIENT)
Dept: RADIOLOGY | Facility: HOSPITAL | Age: 75
DRG: 219 | End: 2025-06-01
Payer: COMMERCIAL

## 2025-06-01 VITALS
WEIGHT: 230.49 LBS | RESPIRATION RATE: 20 BRPM | TEMPERATURE: 98.6 F | BODY MASS INDEX: 34.14 KG/M2 | HEIGHT: 69 IN | SYSTOLIC BLOOD PRESSURE: 155 MMHG | DIASTOLIC BLOOD PRESSURE: 75 MMHG | HEART RATE: 83 BPM | OXYGEN SATURATION: 92 %

## 2025-06-01 LAB
ALBUMIN SERPL BCP-MCNC: 3.5 G/DL (ref 3.4–5)
ALBUMIN SERPL BCP-MCNC: 3.5 G/DL (ref 3.4–5)
ANION GAP SERPL CALC-SCNC: 12 MMOL/L (ref 10–20)
ANION GAP SERPL CALC-SCNC: 14 MMOL/L (ref 10–20)
BUN SERPL-MCNC: 33 MG/DL (ref 6–23)
BUN SERPL-MCNC: 35 MG/DL (ref 6–23)
CA-I BLD-SCNC: 1.14 MMOL/L (ref 1.1–1.33)
CALCIUM SERPL-MCNC: 8.4 MG/DL (ref 8.6–10.6)
CALCIUM SERPL-MCNC: 8.6 MG/DL (ref 8.6–10.6)
CHLORIDE SERPL-SCNC: 101 MMOL/L (ref 98–107)
CHLORIDE SERPL-SCNC: 104 MMOL/L (ref 98–107)
CO2 SERPL-SCNC: 23 MMOL/L (ref 21–32)
CO2 SERPL-SCNC: 25 MMOL/L (ref 21–32)
CREAT SERPL-MCNC: 1.53 MG/DL (ref 0.5–1.3)
CREAT SERPL-MCNC: 1.55 MG/DL (ref 0.5–1.3)
EGFRCR SERPLBLD CKD-EPI 2021: 46 ML/MIN/1.73M*2
EGFRCR SERPLBLD CKD-EPI 2021: 47 ML/MIN/1.73M*2
ERYTHROCYTE [DISTWIDTH] IN BLOOD BY AUTOMATED COUNT: 14.5 % (ref 11.5–14.5)
ERYTHROCYTE [DISTWIDTH] IN BLOOD BY AUTOMATED COUNT: 14.6 % (ref 11.5–14.5)
GLUCOSE BLD MANUAL STRIP-MCNC: 107 MG/DL (ref 74–99)
GLUCOSE BLD MANUAL STRIP-MCNC: 155 MG/DL (ref 74–99)
GLUCOSE BLD MANUAL STRIP-MCNC: 164 MG/DL (ref 74–99)
GLUCOSE BLD MANUAL STRIP-MCNC: 172 MG/DL (ref 74–99)
GLUCOSE BLD MANUAL STRIP-MCNC: 176 MG/DL (ref 74–99)
GLUCOSE SERPL-MCNC: 156 MG/DL (ref 74–99)
GLUCOSE SERPL-MCNC: 266 MG/DL (ref 74–99)
HCT VFR BLD AUTO: 31.6 % (ref 41–52)
HCT VFR BLD AUTO: 32.3 % (ref 41–52)
HGB BLD-MCNC: 10.3 G/DL (ref 13.5–17.5)
HGB BLD-MCNC: 10.5 G/DL (ref 13.5–17.5)
MAGNESIUM SERPL-MCNC: 2.61 MG/DL (ref 1.6–2.4)
MAGNESIUM SERPL-MCNC: 3.09 MG/DL (ref 1.6–2.4)
MCH RBC QN AUTO: 31.1 PG (ref 26–34)
MCH RBC QN AUTO: 31.4 PG (ref 26–34)
MCHC RBC AUTO-ENTMCNC: 32.5 G/DL (ref 32–36)
MCHC RBC AUTO-ENTMCNC: 32.6 G/DL (ref 32–36)
MCV RBC AUTO: 96 FL (ref 80–100)
MCV RBC AUTO: 96 FL (ref 80–100)
NRBC BLD-RTO: 0 /100 WBCS (ref 0–0)
NRBC BLD-RTO: 0 /100 WBCS (ref 0–0)
PHOSPHATE SERPL-MCNC: 3.3 MG/DL (ref 2.5–4.9)
PHOSPHATE SERPL-MCNC: 3.6 MG/DL (ref 2.5–4.9)
PLATELET # BLD AUTO: 100 X10*3/UL (ref 150–450)
PLATELET # BLD AUTO: 91 X10*3/UL (ref 150–450)
POTASSIUM SERPL-SCNC: 4.4 MMOL/L (ref 3.5–5.3)
POTASSIUM SERPL-SCNC: 4.6 MMOL/L (ref 3.5–5.3)
RBC # BLD AUTO: 3.28 X10*6/UL (ref 4.5–5.9)
RBC # BLD AUTO: 3.38 X10*6/UL (ref 4.5–5.9)
SODIUM SERPL-SCNC: 133 MMOL/L (ref 136–145)
SODIUM SERPL-SCNC: 137 MMOL/L (ref 136–145)
WBC # BLD AUTO: 22.8 X10*3/UL (ref 4.4–11.3)
WBC # BLD AUTO: 23.2 X10*3/UL (ref 4.4–11.3)

## 2025-06-01 PROCEDURE — 2500000001 HC RX 250 WO HCPCS SELF ADMINISTERED DRUGS (ALT 637 FOR MEDICARE OP): Performed by: NURSE PRACTITIONER

## 2025-06-01 PROCEDURE — 99232 SBSQ HOSP IP/OBS MODERATE 35: CPT

## 2025-06-01 PROCEDURE — 99291 CRITICAL CARE FIRST HOUR: CPT | Performed by: ANESTHESIOLOGY

## 2025-06-01 PROCEDURE — 85027 COMPLETE CBC AUTOMATED: CPT

## 2025-06-01 PROCEDURE — 82947 ASSAY GLUCOSE BLOOD QUANT: CPT

## 2025-06-01 PROCEDURE — 2500000004 HC RX 250 GENERAL PHARMACY W/ HCPCS (ALT 636 FOR OP/ED): Performed by: NURSE PRACTITIONER

## 2025-06-01 PROCEDURE — 2500000002 HC RX 250 W HCPCS SELF ADMINISTERED DRUGS (ALT 637 FOR MEDICARE OP, ALT 636 FOR OP/ED)

## 2025-06-01 PROCEDURE — 2500000004 HC RX 250 GENERAL PHARMACY W/ HCPCS (ALT 636 FOR OP/ED): Mod: JZ

## 2025-06-01 PROCEDURE — 71045 X-RAY EXAM CHEST 1 VIEW: CPT

## 2025-06-01 PROCEDURE — 2500000002 HC RX 250 W HCPCS SELF ADMINISTERED DRUGS (ALT 637 FOR MEDICARE OP, ALT 636 FOR OP/ED): Performed by: NURSE PRACTITIONER

## 2025-06-01 PROCEDURE — 83735 ASSAY OF MAGNESIUM: CPT

## 2025-06-01 PROCEDURE — 80069 RENAL FUNCTION PANEL: CPT

## 2025-06-01 PROCEDURE — 36415 COLL VENOUS BLD VENIPUNCTURE: CPT

## 2025-06-01 PROCEDURE — 2500000004 HC RX 250 GENERAL PHARMACY W/ HCPCS (ALT 636 FOR OP/ED): Mod: JZ | Performed by: NURSE PRACTITIONER

## 2025-06-01 PROCEDURE — 2500000001 HC RX 250 WO HCPCS SELF ADMINISTERED DRUGS (ALT 637 FOR MEDICARE OP)

## 2025-06-01 PROCEDURE — 1200000002 HC GENERAL ROOM WITH TELEMETRY DAILY

## 2025-06-01 PROCEDURE — 37799 UNLISTED PX VASCULAR SURGERY: CPT

## 2025-06-01 PROCEDURE — 82330 ASSAY OF CALCIUM: CPT

## 2025-06-01 RX ORDER — HYDRALAZINE HYDROCHLORIDE 10 MG/1
50 TABLET, FILM COATED ORAL EVERY 8 HOURS
Status: DISCONTINUED | OUTPATIENT
Start: 2025-06-01 | End: 2025-06-02

## 2025-06-01 RX ORDER — BISACODYL 10 MG/1
10 SUPPOSITORY RECTAL ONCE
Status: COMPLETED | OUTPATIENT
Start: 2025-06-01 | End: 2025-06-01

## 2025-06-01 RX ORDER — INSULIN LISPRO 100 [IU]/ML
0-15 INJECTION, SOLUTION INTRAVENOUS; SUBCUTANEOUS
Status: DISCONTINUED | OUTPATIENT
Start: 2025-06-01 | End: 2025-06-02

## 2025-06-01 RX ORDER — HYDRALAZINE HYDROCHLORIDE 20 MG/ML
10 INJECTION INTRAMUSCULAR; INTRAVENOUS ONCE
Status: COMPLETED | OUTPATIENT
Start: 2025-06-01 | End: 2025-06-01

## 2025-06-01 RX ADMIN — ACETAMINOPHEN 650 MG: 325 TABLET ORAL at 20:10

## 2025-06-01 RX ADMIN — OXYCODONE 5 MG: 5 TABLET ORAL at 21:28

## 2025-06-01 RX ADMIN — INSULIN GLARGINE 20 UNITS: 100 INJECTION, SOLUTION SUBCUTANEOUS at 09:24

## 2025-06-01 RX ADMIN — METOPROLOL TARTRATE 25 MG: 25 TABLET, FILM COATED ORAL at 20:10

## 2025-06-01 RX ADMIN — INSULIN LISPRO 5 UNITS: 100 INJECTION, SOLUTION INTRAVENOUS; SUBCUTANEOUS at 01:52

## 2025-06-01 RX ADMIN — HYDRALAZINE HYDROCHLORIDE 10 MG: 20 INJECTION INTRAMUSCULAR; INTRAVENOUS at 00:15

## 2025-06-01 RX ADMIN — ACETAMINOPHEN 650 MG: 325 TABLET ORAL at 14:57

## 2025-06-01 RX ADMIN — HYDRALAZINE HYDROCHLORIDE 25 MG: 25 TABLET ORAL at 04:55

## 2025-06-01 RX ADMIN — SENNOSIDES AND DOCUSATE SODIUM 2 TABLET: 50; 8.6 TABLET ORAL at 20:10

## 2025-06-01 RX ADMIN — SIMETHICONE 80 MG: 80 TABLET, CHEWABLE ORAL at 07:56

## 2025-06-01 RX ADMIN — OXYCODONE HYDROCHLORIDE 10 MG: 10 TABLET ORAL at 09:18

## 2025-06-01 RX ADMIN — INSULIN LISPRO 5 UNITS: 100 INJECTION, SOLUTION INTRAVENOUS; SUBCUTANEOUS at 06:57

## 2025-06-01 RX ADMIN — HEPARIN SODIUM 5000 UNITS: 5000 INJECTION INTRAVENOUS; SUBCUTANEOUS at 14:57

## 2025-06-01 RX ADMIN — ASPIRIN 81 MG: 81 TABLET, CHEWABLE ORAL at 09:19

## 2025-06-01 RX ADMIN — METOPROLOL TARTRATE 25 MG: 25 TABLET, FILM COATED ORAL at 09:18

## 2025-06-01 RX ADMIN — INSULIN LISPRO 5 UNITS: 100 INJECTION, SOLUTION INTRAVENOUS; SUBCUTANEOUS at 16:41

## 2025-06-01 RX ADMIN — BISACODYL 10 MG: 10 SUPPOSITORY RECTAL at 06:52

## 2025-06-01 RX ADMIN — PANTOPRAZOLE SODIUM 40 MG: 40 TABLET, DELAYED RELEASE ORAL at 06:52

## 2025-06-01 RX ADMIN — HYDRALAZINE HYDROCHLORIDE 10 MG: 20 INJECTION INTRAMUSCULAR; INTRAVENOUS at 01:53

## 2025-06-01 RX ADMIN — ATORVASTATIN CALCIUM 80 MG: 80 TABLET, FILM COATED ORAL at 20:10

## 2025-06-01 RX ADMIN — HYDROMORPHONE HYDROCHLORIDE 0.2 MG: 0.2 INJECTION, SOLUTION INTRAMUSCULAR; INTRAVENOUS; SUBCUTANEOUS at 07:56

## 2025-06-01 RX ADMIN — POLYETHYLENE GLYCOL 3350 17 G: 17 POWDER, FOR SOLUTION ORAL at 09:18

## 2025-06-01 RX ADMIN — OXYCODONE 5 MG: 5 TABLET ORAL at 16:41

## 2025-06-01 RX ADMIN — HEPARIN SODIUM 5000 UNITS: 5000 INJECTION INTRAVENOUS; SUBCUTANEOUS at 21:28

## 2025-06-01 RX ADMIN — POLYETHYLENE GLYCOL 3350 17 G: 17 POWDER, FOR SOLUTION ORAL at 20:11

## 2025-06-01 RX ADMIN — CEFAZOLIN SODIUM 2 G: 2 INJECTION, SOLUTION INTRAVENOUS at 06:54

## 2025-06-01 RX ADMIN — HYDRALAZINE HYDROCHLORIDE 50 MG: 10 TABLET ORAL at 20:10

## 2025-06-01 RX ADMIN — HEPARIN SODIUM 5000 UNITS: 5000 INJECTION INTRAVENOUS; SUBCUTANEOUS at 06:52

## 2025-06-01 RX ADMIN — SENNOSIDES AND DOCUSATE SODIUM 2 TABLET: 50; 8.6 TABLET ORAL at 09:18

## 2025-06-01 ASSESSMENT — COGNITIVE AND FUNCTIONAL STATUS - GENERAL
CLIMB 3 TO 5 STEPS WITH RAILING: A LOT
WALKING IN HOSPITAL ROOM: A LITTLE
MOVING TO AND FROM BED TO CHAIR: A LITTLE
DRESSING REGULAR LOWER BODY CLOTHING: A LITTLE
HELP NEEDED FOR BATHING: A LITTLE
MOVING FROM LYING ON BACK TO SITTING ON SIDE OF FLAT BED WITH BEDRAILS: A LITTLE
TOILETING: A LITTLE
DAILY ACTIVITIY SCORE: 20
STANDING UP FROM CHAIR USING ARMS: A LITTLE
MOBILITY SCORE: 17
DRESSING REGULAR UPPER BODY CLOTHING: A LITTLE
TURNING FROM BACK TO SIDE WHILE IN FLAT BAD: A LITTLE

## 2025-06-01 ASSESSMENT — PAIN SCALES - GENERAL
PAINLEVEL_OUTOF10: 4
PAINLEVEL_OUTOF10: 7
PAINLEVEL_OUTOF10: 6
PAINLEVEL_OUTOF10: 2

## 2025-06-01 ASSESSMENT — PAIN DESCRIPTION - ORIENTATION: ORIENTATION: MID

## 2025-06-01 ASSESSMENT — PAIN DESCRIPTION - DESCRIPTORS
DESCRIPTORS: ACHING
DESCRIPTORS: ACHING

## 2025-06-01 ASSESSMENT — PAIN DESCRIPTION - LOCATION: LOCATION: CHEST

## 2025-06-01 ASSESSMENT — PAIN - FUNCTIONAL ASSESSMENT
PAIN_FUNCTIONAL_ASSESSMENT: 0-10

## 2025-06-01 NOTE — PROGRESS NOTES
"CTICU Progress Note  Franky Sauceda/[id number]    Admit Date: 5/30/2025  Hospital Length of Stay: 2   ICU Length of Stay: 1d 14h   CT SURGEON:     SUBJECTIVE:   No acute events overnight. KUB for abd pain which was relieved with simethicone and onr time dilaudid. Slept fairly well. No BM yet.     MEDICATIONS  Infusions:  lactated Ringer's, Last Rate: 5 mL/hr (05/31/25 2100)  nitroglycerin, Last Rate: Stopped (05/31/25 1615)      Scheduled:  acetaminophen, 650 mg, q6h  aspirin, 81 mg, Daily  atorvastatin, 80 mg, Nightly  ceFAZolin, 2 g, q8h  heparin (porcine), 5,000 Units, q8h  hydrALAZINE, 25 mg, q8h  insulin glargine, 20 Units, Daily  insulin lispro, 0-15 Units, q4h  metoprolol tartrate, 25 mg, BID  pantoprazole, 40 mg, Daily before breakfast   Or  pantoprazole, 40 mg, Daily before breakfast  polyethylene glycol, 17 g, BID  sennosides-docusate sodium, 2 tablet, BID      PRN:  calcium gluconate, 1 g, q6h PRN  calcium gluconate, 2 g, q6h PRN  dextrose, 25 g, q15 min PRN   Or  glucagon, 1 mg, q15 min PRN  hydrALAZINE, 10 mg, q6h PRN  HYDROmorphone, 0.2 mg, q2h PRN  magnesium sulfate, 2 g, q6h PRN  magnesium sulfate, 4 g, q6h PRN  naloxone, 0.2 mg, q5 min PRN  oxyCODONE, 10 mg, q4h PRN  oxyCODONE, 5 mg, q4h PRN  oxygen, , Continuous PRN - O2/gases  potassium chloride CR, 20 mEq, q6h PRN   Or  potassium chloride, 20 mEq, q6h PRN  potassium chloride CR, 40 mEq, q6h PRN   Or  potassium chloride, 40 mEq, q6h PRN  potassium chloride, 20 mEq, q6h PRN  potassium chloride, 40 mEq, q6h PRN  prochlorperazine, 10 mg, q6h PRN   Or  prochlorperazine, 10 mg, q6h PRN  simethicone, 80 mg, 4x daily PRN        PHYSICAL EXAM:   Visit Vitals  /57   Pulse 73   Temp 37.2 °C (99 °F) (Temporal)   Resp 16   Ht 1.753 m (5' 9\")   Wt 107 kg (235 lb 3.2 oz)   SpO2 96%   BMI 34.73 kg/m²   Smoking Status Never   BSA 2.28 m²     Wt Readings from Last 5 Encounters:   05/31/25 107 kg (235 lb 3.2 oz)   05/23/25 107 kg (235 lb)   05/21/25 107 kg " (235 lb)   05/09/25 104 kg (230 lb)   04/21/25 106 kg (234 lb 6.4 oz)     INTAKE/OUTPUT:  I/O last 3 completed shifts:  In: 8916.6 (83.6 mL/kg) [P.O.:200; I.V.:1553.2 (14.6 mL/kg); Blood:742; NG/GT:220; IV Piggyback:6201.4]  Out: 2935 (27.5 mL/kg) [Urine:1805 (0.5 mL/kg/hr); Other:200; Blood:250; Chest Tube:680]  Weight: 106.7 kg          Vent settings:       Physical Exam:   - CONSTITUTION: Adult laying male in ICU bed, NAD  - NEUROLOGIC: A and O x3, JORGENSEN spontaneously, strength and sensation intact  - CARDIOVASCULAR: NSR with frequent PVCs.   - RESPIRATORY: On NC 4L.  Equal breath sounds  - GI: abdomen slightly distended, tympanic. non tender  - : Angelo with tea colored urine  - EXTREMITIES: mild edema in upper and lower   - SKIN: incisions intact. CT site is c/d/I. warm and dry  - PSYCHIATRIC: normal behavior    Daily Risk Screen  Intubated: no  Central line: Hemodynamic instability requiring parenteral medication --> will remove today  Angelo: Accurate I/Os in critically ill patient --> will remove today    Images:     Invasive Hemodynamics:    Most Recent Range Past 24hrs   BP (Art) 139/70 Arterial Line BP 1  Min: 109/57  Max: 148/69   MAP(Art) 96 mmHg Arterial Line MAP 1 (mmHg)   Min: 72 mmHg  Max: 97 mmHg   RA/CVP   No data recorded   PA   No data recorded   PA(mean)   No data recorded   CO   No data recorded   CI   No data recorded   Mixed Venous   No data recorded   SVR    No data recorded         Assessment/Plan   75yo male with PMHof NIDDM, HTN, atypical atrial flutter, paroxysmal atrial fibrillation, CAD, and TIA who now presents s/p AVR, CABG x3, BRENT clip, MAZE, posterior pericardial window.      Plan:  NEURO:  PMH of TIA without residual deficits.  Acute post operative pain, received single shot bilateral MOON blocks and methadone in OR.  Pain well controlled--mostly chest tube related -->  - Serial neuro and pain assessments   - Scheduled Tylenol   - PRN oxycodone  - PRN dilaudid for pain   - PT  Consult, OOB to chair as tolerated, chair position if not tolerated   - CAM ICU score qshift  - Sleep/wake cycle hygiene  - home ASA/statin     CV:  Patient has a history of HTN, atypical atrial flutter, paroxysmal atrial fibrillation, CAD and is now status post AVR, CABG x3, BRENT clip, MAZE, posterior pericardial window. Pre/Post EF: NML. Arrived to CTICU on epi 0.04 mcg/kg/min and levo 0.04 mcg/kg/min since weaned off. NSR/ sinus americo with frequent PVCs. V epicardial wires set VVI @ 35.-->  - Maintain goal MAP 70-90  - Volume resuscitate as clinically indicated  - Maintain epicardial wires set VVI @ 35, pressure not improved with pacing VVI higher  - ASA, statin  - Cont metop 25 bid, inc hydral to 50mg q8hrs  - prn hydral IV for MAP > 95  - Hold home dilatiazem ER, Losartan     PULM:  No history of pulmonary disease.  Extubated. Chest tubes with L pleural and mediastinal still with serosang output. Robust oxygenation with appropriate ventilation. On 4 L NC-->  - Daily CXR: Pending this AM  - Wean FiO2 maintaining SpO2 >92%.   - IS q1h and OOB to chair  - Chest tubes to wall suction. LP meets criteria, meds does not yet.     GI:  PMH of GERD on home PPI.-->  - Continue home PPI  - Passed swallow, reg diet  - Colace/senna BID and miralax BID.  Suppository     :  CSA-JO Risk Score low.  No history of renal disease, baseline creatinine 1-1.4. Creatinine stable post-op. Marie in place and making adequate UOP  Post op JO stable -->  - Continue marie catheter for strict I/Os.  - Goal UOP 0.5ml/kg/hr  - RFP as clinically indicated  - Replete electrolytes per CTICU protocol  - diurese to even     ENDO:  PMH of NIDDM. A1c: 7. Stress hyperglycemia managed on SSI and lantus. -->  - Maintain BG <180, insulin per CTICU protocol. Continuing insulin infusion for now  - cont 20 lantus and SSI #3  - Hold home semaglutide and metformin     HEME:  Acute blood loss anemia and thrombocytopenia with acceptable chest tube output.-->     - Monitor drain output volume and characteristics  - CBC, coags, and fibrinogen post op and as clinically indicated  - ASA   - SQH, SCDs for DVT prophylaxis.  - Last type and screen: 5/30  - hold home Eliquis for afib/flutter for now; will need to discuss with Dr. Molina regarding restarting     ID:  Afebrile, no current indications of infection. MRSA negative. -->  - Trend temp q4h  - Periop cefazolin x 48hrs     Skin:  No active skin issues.  - preventative Mepilex dressings in place on sacrum and heels  - change preventative Mepilex weekly or more frequently as indicated (when moist/soiled)   - every shift skin assessment per nursing and weekly ICU skin rounds  - moisture barrier to be applied with errol care  - active skin problems addressed with nursing on daily rounds     Proph:  SCDs  PPI  SQH     Right IJ MAC w Minimac placed 5/30 --> remove   Left brachial a-line placed 5/30 --> remove   PIV     F: Family: will update at bedside    Restraints: none    Code status: Full Code  Emergency contact: Extended Emergency Contact Information  Primary Emergency Contact: ALTA SANCHEZ  Address: [address] [place] [id number] [place]  Mobile Phone: [phone]  Relation: Spouse     A,B,C,D,E,F,G: reviewed    Dispo:  LT3     Sunil Hernandez DO

## 2025-06-01 NOTE — PROGRESS NOTES
Plan:  - analgesia  - wean NTG gtt as tolerated   - metop 12.5bid; hydral  Increase to 50q8h  - lantus 20units, cardiac ISS  - SQH   - volume resuscitation as clinically indicated   - hold home apixaban  - statin  - Dispo: ICU    Quality/Safety/Proph:  - GI prophy: PPI  - DVT prophy: scd and held d/t post-op   - Central line: n/a  - Angelo: n/a  - Restraint: n/a    Assessment:    Neuro/MSK: expected acute post op pain  and h/o TIA without residual  A-F bundle; Sleep Hygiene measures: appropriate daytime stimulation/physical activity. Lights out at 2100, avoidance of night time lab draws/night baths, minimize mind altering medicaments  PT/OT and OOB as appropriate    CV: HTN hx , HPL, Afib with atypical flutter , CAD s/p CABG - 3vessel, and aortic stenosis - now s/p AVR   -    off vasoactive infusions    Pulm: acute post op pulmonary insufficiency   BPH with IS/flutter/OOB  Current vent settings:      Renal: CKD 3a   Trend UOP and renal indices; replete lytes PRN     GI: Obesity  and GERD   Proph with PPI  PRN anti-emetics, Bowel regimen, and swallow eval once extubated  ID/rheum: n/a  Current abx: errol-op with cefazolin x48h  Follow cx data     Endo: NIDDM with stress hyperlgycemia   Start ISS  for BG goal <180  Last HbA1c: 5/23/2025: 7.0 % (H)      Heme: ABL anemia    -     Trend Hb and transfuse PRN for goal Hb>7  DVT proph with scd and held d/t post-op     LDA:  Chest Tube 2 Mediastinal (Active)   Placement Date/Time: 05/30/25 1400   Tube Number: 2  Chest Tube Location: Mediastinal  Chest Tube Drainage System: Suction   Number of days: 1       Chest Tube 1 Left Pleural (Active)   Placement Date/Time: 05/30/25 1400   Tube Number: 1  Chest Tube Orientation: Left  Chest Tube Location: Pleural  Chest Tube Drainage System: Suction   Number of days: 1       Exam:    A&O x 4  NSR  Adequate air-exchange  Abd soft, NT  Extremities warm     REASON FOR ADMISSION    74 y.o. male with  NIDDM, HTN, atypical atrial flutter,  "paroxysmal atrial fibrillation, CAD, and TIA who now presents s/p AVR, CABG x3, BRENT clip, MAZE, posterior pericardial window. He has had worsening shortness of breath and was being workup up for possible TAVR but was found to have multivessel CAD on [facility] [place] [facility].     TODAY'S EVENTS    5/30: admitted post AVR/CABGx3  5/31: persistent NTG and insulin infusion requirements  6/1: off NTG and insulin infusions, L PL chest tube \"stuck\"; overall improved    Level 3             LABS:  CMP:  Results from last 7 days   Lab Units 06/01/25  0146 05/31/25  1251 05/31/25  0026 05/30/25  1429   SODIUM mmol/L 137 137 141 144   POTASSIUM mmol/L 4.4 4.1 4.6 4.1   CHLORIDE mmol/L 104 105 110* 110*   CO2 mmol/L 25 23 22 20*   ANION GAP mmol/L 12 13 14 18   BUN mg/dL 33* 30* 26* 22   CREATININE mg/dL 1.53* 1.68* 1.47* 1.61*   EGFR mL/min/1.73m*2 47* 42* 49* 44*   MAGNESIUM mg/dL 3.09* 2.51* 2.70* 3.37*   ALBUMIN g/dL 3.5 3.4 3.5 3.7     CBC:  Results from last 7 days   Lab Units 06/01/25  0146 05/31/25  1251 05/31/25  0026 05/30/25  1429   WBC AUTO x10*3/uL 22.8* 20.4* 11.6* 18.5*   HEMOGLOBIN g/dL 10.3* 9.9* 9.9* 10.7*   HEMATOCRIT % 31.6* 30.5* 31.1* 32.9*   PLATELETS AUTO x10*3/uL 91* 109* 111* 141*     COAG:   Results from last 7 days   Lab Units 05/30/25  1429   INR  1.2*     ABO:   ABO TYPE   Date Value Ref Range Status   05/30/2025 A  Final     HEME/ENDO:     CARDIAC:       No lab exists for component: \"CK\", \"CKMBP\"  MICRO: No results found for the last 90 days.    "

## 2025-06-02 ENCOUNTER — APPOINTMENT (OUTPATIENT)
Dept: RADIOLOGY | Facility: HOSPITAL | Age: 75
DRG: 219 | End: 2025-06-02
Payer: COMMERCIAL

## 2025-06-02 LAB
ALBUMIN SERPL BCP-MCNC: 3.2 G/DL (ref 3.4–5)
ANION GAP SERPL CALC-SCNC: 13 MMOL/L (ref 10–20)
ATRIAL RATE: 61 BPM
BLOOD EXPIRATION DATE: NORMAL
BUN SERPL-MCNC: 31 MG/DL (ref 6–23)
CALCIUM SERPL-MCNC: 8.5 MG/DL (ref 8.6–10.6)
CHLORIDE SERPL-SCNC: 101 MMOL/L (ref 98–107)
CO2 SERPL-SCNC: 24 MMOL/L (ref 21–32)
CREAT SERPL-MCNC: 1.21 MG/DL (ref 0.5–1.3)
DISPENSE STATUS: NORMAL
EGFRCR SERPLBLD CKD-EPI 2021: 62 ML/MIN/1.73M*2
ERYTHROCYTE [DISTWIDTH] IN BLOOD BY AUTOMATED COUNT: 14.2 % (ref 11.5–14.5)
GLUCOSE BLD MANUAL STRIP-MCNC: 115 MG/DL (ref 74–99)
GLUCOSE BLD MANUAL STRIP-MCNC: 142 MG/DL (ref 74–99)
GLUCOSE BLD MANUAL STRIP-MCNC: 170 MG/DL (ref 74–99)
GLUCOSE BLD MANUAL STRIP-MCNC: 271 MG/DL (ref 74–99)
GLUCOSE SERPL-MCNC: 160 MG/DL (ref 74–99)
HCT VFR BLD AUTO: 31.8 % (ref 41–52)
HGB BLD-MCNC: 10.2 G/DL (ref 13.5–17.5)
MAGNESIUM SERPL-MCNC: 2.22 MG/DL (ref 1.6–2.4)
MCH RBC QN AUTO: 30.6 PG (ref 26–34)
MCHC RBC AUTO-ENTMCNC: 32.1 G/DL (ref 32–36)
MCV RBC AUTO: 96 FL (ref 80–100)
NRBC BLD-RTO: 0 /100 WBCS (ref 0–0)
P AXIS: 78 DEGREES
P OFFSET: 185 MS
P ONSET: 128 MS
PHOSPHATE SERPL-MCNC: 3.1 MG/DL (ref 2.5–4.9)
PLATELET # BLD AUTO: 94 X10*3/UL (ref 150–450)
POTASSIUM SERPL-SCNC: 4.3 MMOL/L (ref 3.5–5.3)
PR INTERVAL: 194 MS
PRODUCT BLOOD TYPE: 6200
PRODUCT CODE: NORMAL
Q ONSET: 225 MS
QRS COUNT: 10 BEATS
QRS DURATION: 88 MS
QT INTERVAL: 462 MS
QTC CALCULATION(BAZETT): 465 MS
QTC FREDERICIA: 464 MS
R AXIS: -15 DEGREES
RBC # BLD AUTO: 3.33 X10*6/UL (ref 4.5–5.9)
SODIUM SERPL-SCNC: 134 MMOL/L (ref 136–145)
T AXIS: 50 DEGREES
T OFFSET: 456 MS
UNIT ABO: NORMAL
UNIT NUMBER: NORMAL
UNIT RH: NORMAL
UNIT VOLUME: 350
VENTRICULAR RATE: 61 BPM
WBC # BLD AUTO: 16.8 X10*3/UL (ref 4.4–11.3)
XM INTEP: NORMAL

## 2025-06-02 PROCEDURE — 97165 OT EVAL LOW COMPLEX 30 MIN: CPT | Mod: GO

## 2025-06-02 PROCEDURE — 83735 ASSAY OF MAGNESIUM: CPT | Performed by: NURSE PRACTITIONER

## 2025-06-02 PROCEDURE — 97530 THERAPEUTIC ACTIVITIES: CPT | Mod: GP,CQ

## 2025-06-02 PROCEDURE — 71045 X-RAY EXAM CHEST 1 VIEW: CPT

## 2025-06-02 PROCEDURE — 36415 COLL VENOUS BLD VENIPUNCTURE: CPT | Performed by: NURSE PRACTITIONER

## 2025-06-02 PROCEDURE — 2500000004 HC RX 250 GENERAL PHARMACY W/ HCPCS (ALT 636 FOR OP/ED): Performed by: NURSE PRACTITIONER

## 2025-06-02 PROCEDURE — 2500000001 HC RX 250 WO HCPCS SELF ADMINISTERED DRUGS (ALT 637 FOR MEDICARE OP): Performed by: NURSE PRACTITIONER

## 2025-06-02 PROCEDURE — 2500000001 HC RX 250 WO HCPCS SELF ADMINISTERED DRUGS (ALT 637 FOR MEDICARE OP)

## 2025-06-02 PROCEDURE — 2500000002 HC RX 250 W HCPCS SELF ADMINISTERED DRUGS (ALT 637 FOR MEDICARE OP, ALT 636 FOR OP/ED): Performed by: NURSE PRACTITIONER

## 2025-06-02 PROCEDURE — 1200000002 HC GENERAL ROOM WITH TELEMETRY DAILY

## 2025-06-02 PROCEDURE — 82947 ASSAY GLUCOSE BLOOD QUANT: CPT

## 2025-06-02 PROCEDURE — 97116 GAIT TRAINING THERAPY: CPT | Mod: GP,CQ

## 2025-06-02 PROCEDURE — 80069 RENAL FUNCTION PANEL: CPT | Performed by: NURSE PRACTITIONER

## 2025-06-02 PROCEDURE — 99232 SBSQ HOSP IP/OBS MODERATE 35: CPT

## 2025-06-02 PROCEDURE — 85027 COMPLETE CBC AUTOMATED: CPT | Performed by: NURSE PRACTITIONER

## 2025-06-02 PROCEDURE — 2500000004 HC RX 250 GENERAL PHARMACY W/ HCPCS (ALT 636 FOR OP/ED)

## 2025-06-02 PROCEDURE — 97110 THERAPEUTIC EXERCISES: CPT | Mod: GO

## 2025-06-02 PROCEDURE — 2500000004 HC RX 250 GENERAL PHARMACY W/ HCPCS (ALT 636 FOR OP/ED): Mod: JZ | Performed by: NURSE PRACTITIONER

## 2025-06-02 RX ORDER — BISACODYL 10 MG/1
10 SUPPOSITORY RECTAL DAILY PRN
Status: DISCONTINUED | OUTPATIENT
Start: 2025-06-02 | End: 2025-06-05 | Stop reason: HOSPADM

## 2025-06-02 RX ORDER — HYDRALAZINE HYDROCHLORIDE 50 MG/1
50 TABLET, FILM COATED ORAL 2 TIMES DAILY
Status: DISCONTINUED | OUTPATIENT
Start: 2025-06-02 | End: 2025-06-03

## 2025-06-02 RX ORDER — FUROSEMIDE 10 MG/ML
20 INJECTION INTRAMUSCULAR; INTRAVENOUS ONCE
Status: COMPLETED | OUTPATIENT
Start: 2025-06-02 | End: 2025-06-02

## 2025-06-02 RX ORDER — MULTIVIT-MIN/IRON FUM/FOLIC AC 7.5 MG-4
1 TABLET ORAL DAILY
Status: DISCONTINUED | OUTPATIENT
Start: 2025-06-03 | End: 2025-06-05 | Stop reason: HOSPADM

## 2025-06-02 RX ORDER — INSULIN LISPRO 100 [IU]/ML
0-10 INJECTION, SOLUTION INTRAVENOUS; SUBCUTANEOUS
Status: DISCONTINUED | OUTPATIENT
Start: 2025-06-02 | End: 2025-06-05 | Stop reason: HOSPADM

## 2025-06-02 RX ADMIN — INSULIN GLARGINE 20 UNITS: 100 INJECTION, SOLUTION SUBCUTANEOUS at 08:49

## 2025-06-02 RX ADMIN — HEPARIN SODIUM 5000 UNITS: 5000 INJECTION INTRAVENOUS; SUBCUTANEOUS at 23:04

## 2025-06-02 RX ADMIN — PANTOPRAZOLE SODIUM 40 MG: 40 TABLET, DELAYED RELEASE ORAL at 06:11

## 2025-06-02 RX ADMIN — HEPARIN SODIUM 5000 UNITS: 5000 INJECTION INTRAVENOUS; SUBCUTANEOUS at 14:48

## 2025-06-02 RX ADMIN — ACETAMINOPHEN 650 MG: 325 TABLET ORAL at 14:48

## 2025-06-02 RX ADMIN — PROCHLORPERAZINE EDISYLATE 10 MG: 5 INJECTION INTRAMUSCULAR; INTRAVENOUS at 20:07

## 2025-06-02 RX ADMIN — OXYCODONE HYDROCHLORIDE 10 MG: 10 TABLET ORAL at 10:11

## 2025-06-02 RX ADMIN — HEPARIN SODIUM 5000 UNITS: 5000 INJECTION INTRAVENOUS; SUBCUTANEOUS at 06:11

## 2025-06-02 RX ADMIN — FUROSEMIDE 20 MG: 10 INJECTION, SOLUTION INTRAMUSCULAR; INTRAVENOUS at 12:34

## 2025-06-02 RX ADMIN — ASPIRIN 81 MG: 81 TABLET, CHEWABLE ORAL at 08:48

## 2025-06-02 RX ADMIN — HYDROMORPHONE HYDROCHLORIDE 0.2 MG: 0.2 INJECTION, SOLUTION INTRAMUSCULAR; INTRAVENOUS; SUBCUTANEOUS at 09:04

## 2025-06-02 RX ADMIN — ACETAMINOPHEN 650 MG: 325 TABLET ORAL at 01:45

## 2025-06-02 RX ADMIN — ACETAMINOPHEN 650 MG: 325 TABLET ORAL at 08:47

## 2025-06-02 RX ADMIN — OXYCODONE 5 MG: 5 TABLET ORAL at 01:45

## 2025-06-02 RX ADMIN — OXYCODONE 5 MG: 5 TABLET ORAL at 06:11

## 2025-06-02 RX ADMIN — INSULIN LISPRO 5 UNITS: 100 INJECTION, SOLUTION INTRAVENOUS; SUBCUTANEOUS at 12:34

## 2025-06-02 RX ADMIN — HYDRALAZINE HYDROCHLORIDE 50 MG: 50 TABLET ORAL at 20:07

## 2025-06-02 RX ADMIN — ATORVASTATIN CALCIUM 80 MG: 80 TABLET, FILM COATED ORAL at 20:07

## 2025-06-02 RX ADMIN — POLYETHYLENE GLYCOL 3350 17 G: 17 POWDER, FOR SOLUTION ORAL at 08:49

## 2025-06-02 RX ADMIN — METOPROLOL TARTRATE 25 MG: 25 TABLET, FILM COATED ORAL at 20:07

## 2025-06-02 RX ADMIN — HYDRALAZINE HYDROCHLORIDE 50 MG: 10 TABLET ORAL at 06:11

## 2025-06-02 RX ADMIN — SENNOSIDES AND DOCUSATE SODIUM 2 TABLET: 50; 8.6 TABLET ORAL at 08:47

## 2025-06-02 RX ADMIN — METOPROLOL TARTRATE 25 MG: 25 TABLET, FILM COATED ORAL at 08:48

## 2025-06-02 RX ADMIN — ACETAMINOPHEN 650 MG: 325 TABLET ORAL at 20:07

## 2025-06-02 ASSESSMENT — PAIN - FUNCTIONAL ASSESSMENT
PAIN_FUNCTIONAL_ASSESSMENT: 0-10

## 2025-06-02 ASSESSMENT — PAIN SCALES - GENERAL
PAINLEVEL_OUTOF10: 5 - MODERATE PAIN
PAINLEVEL_OUTOF10: 4
PAINLEVEL_OUTOF10: 4
PAINLEVEL_OUTOF10: 5 - MODERATE PAIN
PAINLEVEL_OUTOF10: 6
PAINLEVEL_OUTOF10: 7
PAINLEVEL_OUTOF10: 0 - NO PAIN
PAINLEVEL_OUTOF10: 2
PAINLEVEL_OUTOF10: 7
PAINLEVEL_OUTOF10: 0 - NO PAIN

## 2025-06-02 ASSESSMENT — COGNITIVE AND FUNCTIONAL STATUS - GENERAL
DRESSING REGULAR LOWER BODY CLOTHING: A LOT
CLIMB 3 TO 5 STEPS WITH RAILING: A LOT
STANDING UP FROM CHAIR USING ARMS: A LITTLE
HELP NEEDED FOR BATHING: A LITTLE
TURNING FROM BACK TO SIDE WHILE IN FLAT BAD: A LITTLE
MOVING TO AND FROM BED TO CHAIR: A LITTLE
MOVING FROM LYING ON BACK TO SITTING ON SIDE OF FLAT BED WITH BEDRAILS: A LITTLE
WALKING IN HOSPITAL ROOM: A LITTLE
DRESSING REGULAR UPPER BODY CLOTHING: A LITTLE
MOBILITY SCORE: 17
TOILETING: A LITTLE
DAILY ACTIVITIY SCORE: 18
PERSONAL GROOMING: A LITTLE

## 2025-06-02 ASSESSMENT — PAIN DESCRIPTION - LOCATION
LOCATION: CHEST
LOCATION: CHEST

## 2025-06-02 ASSESSMENT — ACTIVITIES OF DAILY LIVING (ADL): BATHING_ASSISTANCE: MINIMAL

## 2025-06-02 ASSESSMENT — PAIN DESCRIPTION - DESCRIPTORS
DESCRIPTORS: ACHING
DESCRIPTORS: ACHING

## 2025-06-02 NOTE — PROGRESS NOTES
Occupational Therapy    Evaluation/Treatment    Patient Name: Franky Sauceda  MRN: [id number]  Department: [facility] 3  Room: [address] [place] [address]  Today's Date: 06/02/25  Time Calculation  Start Time: 0933  Stop Time: 0956  Time Calculation (min): 23 min       Assessment:  OT Assessment: difficulty I/ADLS, safety, fxnl mob  Prognosis: Good  Barriers to Discharge Home: No anticipated barriers  Evaluation/Treatment Tolerance: Patient tolerated treatment well  Medical Staff Made Aware: Yes  End of Session Communication: Bedside nurse  End of Session Patient Position: Up in chair, Alarm off, not on at start of session  OT Assessment Results: Decreased ADL status, Decreased endurance, Decreased upper extremity strength, Decreased functional mobility, Decreased IADLs  Prognosis: Good  Barriers to Discharge: None  Evaluation/Treatment Tolerance: Patient tolerated treatment well  Medical Staff Made Aware: Yes  Strengths: Attitude of self, Support of Caregivers  Barriers to Participation: Comorbidities  Plan:  Treatment Interventions: ADL retraining, Functional transfer training, UE strengthening/ROM, Endurance training, Patient/family training, Equipment evaluation/education, Compensatory technique education  OT Frequency: 2 times per week  OT Discharge Recommendations: Low intensity level of continued care  Equipment Recommended upon Discharge:  (hip kit, tub bench)  OT Recommended Transfer Status: Assist of 1  OT - OK to Discharge: Yes  Treatment Interventions: ADL retraining, Functional transfer training, UE strengthening/ROM, Endurance training, Patient/family training, Equipment evaluation/education, Compensatory technique education    Subjective   Current Problem:  1. Severe aortic stenosis  Admit to inpatient    Admit to inpatient    Anesthesia Intraoperative Transesophageal Echocardiogram    Anesthesia Intraoperative Transesophageal Echocardiogram    Surgical Pathology Exam    Surgical Pathology Exam    CANCELED: NPO Diet  Except: Sips with meds; Effective now    CANCELED: Height and weight    CANCELED: Insert and maintain peripheral IV    CANCELED: Saline lock IV    CANCELED: POCT Glucose    CANCELED: Type And Screen    CANCELED: Surgical preparation    CANCELED: Bath/Shower with Chlorhexidine Gluconate    CANCELED: NPO Diet Except: Sips with meds; Effective now    CANCELED: Height and weight    CANCELED: Insert and maintain peripheral IV    CANCELED: Saline lock IV    CANCELED: POCT Glucose    CANCELED: Surgical preparation    CANCELED: Bath/Shower with Chlorhexidine Gluconate    CANCELED: Inpatient consult to Respiratory Care    CANCELED: Inpatient consult to Respiratory Care      2. Coronary artery disease involving native coronary artery of native heart without angina pectoris  Admit to inpatient    Admit to inpatient    Surgical Pathology Exam    Surgical Pathology Exam    CANCELED: NPO Diet Except: Sips with meds; Effective now    CANCELED: Height and weight    CANCELED: Insert and maintain peripheral IV    CANCELED: Saline lock IV    CANCELED: POCT Glucose    CANCELED: Type And Screen    CANCELED: Surgical preparation    CANCELED: Bath/Shower with Chlorhexidine Gluconate    CANCELED: NPO Diet Except: Sips with meds; Effective now    CANCELED: Height and weight    CANCELED: Insert and maintain peripheral IV    CANCELED: Saline lock IV    CANCELED: POCT Glucose    CANCELED: Surgical preparation    CANCELED: Bath/Shower with Chlorhexidine Gluconate    CANCELED: Inpatient consult to Respiratory Care    CANCELED: Inpatient consult to Respiratory Care      3. Nonrheumatic aortic (valve) stenosis with insufficiency  Anesthesia Intraoperative Transesophageal Echocardiogram      4. Atherosclerotic heart disease of native coronary artery with other forms of angina pectoris  Anesthesia Intraoperative Transesophageal Echocardiogram        OT Visit Info:  OT Received On: 06/02/25  General Visit Info:   General  Reason for Referral: AVR,  CABG x3, BRENT clip, MAZE, posterior pericardial window.  Past Medical History Relevant to Rehab: NIDDM, HTN, atypical atrial flutter, paroxysmal atrial fibrillation, CAD, and TIA  Caregiver Feedback: family present  Prior to Session Communication: Bedside nurse  Patient Position Received: Up in chair, Alarm off, caregiver present   Precautions:  Medical Precautions: Cardiac precautions, Fall precautions, Oxygen therapy device and L/min, Chest tube  Post-Surgical Precautions: Move in the Tube     Date/Time Vitals Session Patient Position Pulse Resp SpO2 BP MAP (mmHg)    06/02/25 0933 During OT  --  88  --  --  --  --                 Pain:  Pain Assessment  Pain Assessment: 0-10  0-10 (Numeric) Pain Score: 5 - Moderate pain  Pain Type: Surgical pain    Objective   Cognition:  Overall Cognitive Status: Within Functional Limits  Orientation Level: Oriented X4  Insight: Within function limits           Home Living:  Type of Home: House  Lives With: Spouse  Home Adaptive Equipment: Walker rolling or standard  Home Layout:  (2 JOANNE HR, 1st floor bed with tub shower, 2nd floor bedroom with walkin shower)  Bathroom Shower/Tub: Tub/shower unit, Walk-in shower  Bathroom Toilet: Standard  Bathroom Equipment:  (shower chair, grab bars in shower)  Prior Function:  Level of [place]: Independent with ADLs and functional transfers, Independent with homemaking with ambulation  Ambulatory Assistance: Independent  Vocational: Retired  Leisure: family  Hand Dominance: Right  IADL History:  IADL Comments: I I/ADLs, +drive, - pets  ADL:  Eating Assistance: Independent  Grooming Assistance:  (min A anticipated standing)  Bathing Assistance: Minimal (anticipated AE)  UE Dressing Assistance: Minimal  LE Dressing Assistance:  (pt att feet to self, mod A adjust socks)  Toileting Assistance with Device:  (min A anticipated WHW)  Functional Deficit: Setup, Steadying, Verbal cueing, Supervision/safety    Activity Tolerance:  Endurance:   (fair)       Bed Mobility/Transfers: Bed Mobility  Bed Mobility: No    Transfers  Transfer:  (sit/stand min A WhW)      Functional Mobility:  Functional Mobility  Functional Mobility Performed:  (pt performed fxnl mob in room WHW MIN A)  Sitting Balance:  Dynamic Sitting Balance  Dynamic Sitting-Level of Assistance:  (seated in chair >10 min I)  Standing Balance:  Dynamic Standing Balance  Dynamic Standing-Level of Assistance: Minimum assistance (WhW)       Therapy/Activity: Therapeutic Exercise  Therapeutic Exercise Performed:  (OT provided education and demo BUE ther ex for MITT prec, pt performed 1x 5 reps BUE shoulder flex/ext to 90 deg, shoulder ab/adduction, neck flex/ext, neck rotation, B post shoulder rolls)    Therapeutic Activity  Therapeutic Activity Performed:  (OT provided education/demo MITT prec I/ADLS, AE and how to purchase post d/c, handouts provided, wife and dtr present for education and demo. OT educated and demo UBD cholo/doff shirts shoulder height, demo toileting and errol care and IADLS with prec)     Vision:Vision - Basic Assessment  Current Vision: Wears glasses only for reading  Sensation:  Light Touch: No apparent deficits  Strength:  Strength Comments: B shoulder 3+/5, distal WFL    Coordination:  Movements are Fluid and Coordinated: Yes   Hand Function:  Hand Function  Gross Grasp: Functional  Extremities: RUE   RUE :  (shoulder 0-90 deg, distal WFL) and LUE   LUE:  (shoulder 0-90 deg, distal WFL)      Outcome Measures: [facility] Daily Activity  Putting on and taking off regular lower body clothing: A lot  Bathing (including washing, rinsing, drying): A little  Putting on and taking off regular upper body clothing: A little  Toileting, which includes using toilet, bedpan or urinal: A little  Taking care of personal grooming such as brushing teeth: A little  Eating Meals: None  Daily Activity - Total Score: 18         and OT Adult Other Outcome Measures  4AT: -    Education  Documentation  Handouts, taught by Tamie Giordano OT at 6/2/2025 11:28 AM.  Learner: Significant Other, Family, Patient  Readiness: Acceptance  Method: Explanation, Demonstration, Handout  Response: Verbalizes Understanding, Needs Reinforcement  Comment: fxnl mob I/ADLS, MITT, AE    Body Mechanics, taught by Tamie Giordano OT at 6/2/2025 11:28 AM.  Learner: Significant Other, Family, Patient  Readiness: Acceptance  Method: Explanation, Demonstration, Handout  Response: Verbalizes Understanding, Needs Reinforcement  Comment: fxnl mob I/ADLS, MITT, AE    Precautions, taught by Tamie Giordano OT at 6/2/2025 11:28 AM.  Learner: Significant Other, Family, Patient  Readiness: Acceptance  Method: Explanation, Demonstration, Handout  Response: Verbalizes Understanding, Needs Reinforcement  Comment: fxnl mob I/ADLS, MITT, AE    ADL Training, taught by Tamie Giordano OT at 6/2/2025 11:28 AM.  Learner: Significant Other, Family, Patient  Readiness: Acceptance  Method: Explanation, Demonstration, Handout  Response: Verbalizes Understanding, Needs Reinforcement  Comment: fxnl mob I/ADLS, MITT, AE    Education Comments  No comments found.           Goals:  Encounter Problems       Encounter Problems (Active)       ADLs       Patient will perform UB and LB bathing  with modified independent level of assistance and ae. (Progressing)       Start:  06/02/25    Expected End:  06/23/25            Patient with complete upper body dressing with independent level of assistance  (Progressing)       Start:  06/02/25    Expected End:  06/23/25            Patient with complete lower body dressing with modified independent level of assistance donning and doffing all LE clothes  with PRN adaptive equipment  (Progressing)       Start:  06/02/25    Expected End:  06/23/25            Patient will complete daily grooming tasks  with independent level of assistance  (Progressing)       Start:  06/02/25    Expected End:  06/23/25             Patient will complete toileting including hygiene clothing management/hygiene with modified independent level of assistance and lrd. (Progressing)       Start:  06/02/25    Expected End:  06/23/25               COGNITION/SAFETY       Patient will recall and adhere to  precautions during all functional mobility/ADL tasks in order to demonstrate improved understanding and promote healing post op (Progressing)       Start:  06/02/25    Expected End:  06/23/25               EXERCISE/STRENGTHENING       Patient with increase BUE to wfl strength. (Progressing)       Start:  06/02/25    Expected End:  06/23/25               MOBILITY       Patient will perform Functional mobility mod  Household distances/Community Distances with modified independent level of assistance and least restrictive device in order to improve safety and functional mobility. (Progressing)       Start:  06/02/25    Expected End:  06/23/25               TRANSFERS       Patient will perform bed mobility independent level of assistance  (Progressing)       Start:  06/02/25    Expected End:  06/23/25            Patient will complete functional transfer least restrictive device with modified independent level of assistance. (Progressing)       Start:  06/02/25    Expected End:  06/23/25

## 2025-06-02 NOTE — PROGRESS NOTES
"CARDIAC SURGERY DAILY PROGRESS NOTE    Franky Sauceda is a 74 y.o. male, with a PMH of  NIDDM, HTN, atypical atrial flutter, paroxysmal atrial fibrillation, CAD, and TIA, who presented to [facility] on 5/30/2025 for CABG x3 and AVR with Dr. Molina.       OPERATION/PROCEDURE: CABG x3 and AVR with Zaida Molina MD on 5/30/25  Median Sternotomy  CABG x 3: LIMA-LAD/ SVG-OM/SVG-PLV  AVR with 23mm Inspirus Resilia Bioprosthetic Aortic Valve  MAZE with Synergy Encompass  LAAC with AtriCure AtriClip 45mm  Posterior Pericardiotomy  Right Leg EVH  Sternal Closure with Wires and SternaLock XP Rigid Fixation System    CTICU Course: Uneventful   Transferred to T3 on 6/1/25    ======================  Interval History:   No acute events overnight     SUBJECTIVE:  Sitting up in chair. On 4L NC. Pt reports well controlled. Denies having BM    Objective   /77 (BP Location: Right arm, Patient Position: Lying)   Pulse 72   Temp 37 °C (98.6 °F) (Temporal)   Resp 18   Ht 1.753 m (5' 9\")   Wt 106 kg (232 lb 14.4 oz)   SpO2 94%   BMI 34.39 kg/m²   0-10 (Numeric) Pain Score: 4   3 Day Weight Change: -0.01 kg (-0.4 oz) per day    Intake and Output    Intake/Output Summary (Last 24 hours) at 6/2/2025 0726  Last data filed at 6/2/2025 0626  Gross per 24 hour   Intake 880 ml   Output 1370 ml   Net -490 ml       Physical Exam  Physical Exam  Vitals and nursing note reviewed.   Constitutional:       General: He is not in acute distress.  HENT:      Head: Normocephalic and atraumatic.      Mouth/Throat:      Mouth: Mucous membranes are moist.   Eyes:      Conjunctiva/sclera: Conjunctivae normal.   Cardiovascular:      Rate and Rhythm: Normal rate and regular rhythm.      Pulses: Normal pulses.      Heart sounds: Normal heart sounds.      Comments: SR HR 70s-90s  VVI @35, 4  Pulmonary:      Effort: Pulmonary effort is normal.      Comments: 4L NC   Sternum stable  Diminished bases   Abdominal:      Comments: Awaiting " post op BM   Genitourinary:     Comments: Voids independently   Musculoskeletal:      Cervical back: Neck supple.      Right lower leg: Edema (trace) present.      Left lower leg: Edema (trace) present.      Comments: JORGENSEN   Skin:     General: Skin is warm.      Capillary Refill: Capillary refill takes less than 2 seconds.      Comments: midsternal chest incision C/D/I, well approximated, no s/s infection     Neurological:      General: No focal deficit present.      Mental Status: He is alert and oriented to person, place, and time.   Psychiatric:         Attention and Perception: Attention normal.         Mood and Affect: Mood normal.         Speech: Speech normal.         Cognition and Memory: Cognition normal.           Medications  Scheduled medications  Scheduled Medications[1]Continuous medications  Continuous Medications[2]PRN medications  PRN Medications[3]    Labs  Results for orders placed or performed during the hospital encounter of 05/30/25 (from the past 24 hours)   POCT GLUCOSE   Result Value Ref Range    POCT Glucose 172 (H) 74 - 99 mg/dL   CBC   Result Value Ref Range    WBC 23.2 (H) 4.4 - 11.3 x10*3/uL    nRBC 0.0 0.0 - 0.0 /100 WBCs    RBC 3.38 (L) 4.50 - 5.90 x10*6/uL    Hemoglobin 10.5 (L) 13.5 - 17.5 g/dL    Hematocrit 32.3 (L) 41.0 - 52.0 %    MCV 96 80 - 100 fL    MCH 31.1 26.0 - 34.0 pg    MCHC 32.5 32.0 - 36.0 g/dL    RDW 14.6 (H) 11.5 - 14.5 %    Platelets 100 (L) 150 - 450 x10*3/uL   Renal Function Panel   Result Value Ref Range    Glucose 266 (H) 74 - 99 mg/dL    Sodium 133 (L) 136 - 145 mmol/L    Potassium 4.6 3.5 - 5.3 mmol/L    Chloride 101 98 - 107 mmol/L    Bicarbonate 23 21 - 32 mmol/L    Anion Gap 14 10 - 20 mmol/L    Urea Nitrogen 35 (H) 6 - 23 mg/dL    Creatinine 1.55 (H) 0.50 - 1.30 mg/dL    eGFR 46 (L) >60 mL/min/1.73m*2    Calcium 8.4 (L) 8.6 - 10.6 mg/dL    Phosphorus 3.3 2.5 - 4.9 mg/dL    Albumin 3.5 3.4 - 5.0 g/dL   Magnesium   Result Value Ref Range    Magnesium 2.61 (H)  1.60 - 2.40 mg/dL   POCT GLUCOSE   Result Value Ref Range    POCT Glucose 176 (H) 74 - 99 mg/dL   POCT GLUCOSE   Result Value Ref Range    POCT Glucose 107 (H) 74 - 99 mg/dL               IMAGING/ DIAGNOSTIC TESTING:  I have personally reviewed the following test result(s):    XR chest 1 view 06/2/2025  Read pending     XR chest 1 view 06/1/2025   Read pending     XR chest 1 view 05/31/2025  Impression  1. Low lung volume with small left pleural effusion/left basilar  atelectasis.  2. Medical devices as detailed above.      ===============  IMPRESSION & PLAN:  ==============  POD # 3 s/p CABG x3, AVR, MAZE and LAAC with Dr. Molina   - Increase activity/ ambulation; PT/OT  - Encourage IS, C/DB; respiratory therapy; wean O2 as lino   - Cardiac rehab referral   - Continue cardiac meds: ASA, BB, statin   - Pain and anticonstipation meds  - Maintain chest tubes until meet criteria to remove; monitor daily 1v PCXR while chest tubes in place; removed 1 MS CT on 6/2. Post pull CXR done   - 2v CXR ordered for 6/3/25  - Postop echo ordered for 6/3/25   - Cut epicardial wires prior to discharge   - Tele until discharge  - Optimize nutrition and electrolytes    Rhythm  Hx of pAF (pt on Eliquis at home)   - Tele: Sinus rhythm   - Continue BB  - Adjust medications as tolerated    Acute Blood Loss Anemia   Recent Labs     06/01/25  1142 06/01/25  0146 05/31/25  1251 05/31/25  0026 05/30/25  1429 05/23/25  0926 04/16/25  1243   HGB 10.5* 10.3* 9.9* 9.9* 10.7* 13.3* 14.3   HCT 32.3* 31.6* 30.5* 31.1* 32.9* 40.5* 42.0   - MV, PO Iron x1mo  - Daily labs, transfuse as indicated    Thrombocytopenia  Recent Labs     06/01/25  1142 06/01/25  0146 05/31/25  1251 05/31/25  0026 05/30/25  1429 05/23/25  0926 04/16/25  1243   * 91* 109* 111* 141* 228 253   - Etiology likely postop/CPB related  - Continue to trend with daily CBCs    Volume/Electrolyte Status: Preop wt Weight: 105 kg (230 lb 8.9 oz)   Vitals:    06/02/25 0144   Weight: 106  kg (232 lb 14.4 oz)     - Weights:    : 106 kg --> 20 mg IV Lasix x 1   - Adjust diuresis as needed for postop cardiac surgery hypervolemia  - Replete electrolytes for hypokalemia/hypomagnesemia/hypophosphatemia as needed   - Daily weights and strict I&Os  - Daily RFP while admitted    Leukocytosis  Recent Labs     25  1142 25  0146 25  1251 25  0026 25  1429 25  0926 25  1243   WBC 23.2* 22.8* 20.4* 11.6* 18.5* 6.9 7.8     Temp (36hrs), Av °C (98.6 °F), Min:36.7 °C (98.1 °F), Max:37.8 °C (100 °F)     - aggressive pulmonary hygiene  - monitor for s/s infection  - likely atelectasis/ postoperative in etiology  - daily CBC to follow      DM: home meds: Metformin 500 mg BID, Ozempic 0.25 mg injection weekly   Lab Results   Component Value Date    HGBA1C 7.0 (H) 2025     Results from last 7 days   Lab Units 25  1157 25  2123 25  1629 25  1001 25  0656 25  0148 25  2211   POCT GLUCOSE mg/dL 170* 107* 176* 172* 164* 155* 156*   -accuchecks premeal and at bedtime  -diabetic diet  -lispro premeal corrective scale      Hypertension: home meds: Losartan 100 mg daily, Toprol XL 25 mg daily, Diltiazem  mg daily    Systolic (24hrs), Av , Min:115 , Max:155   - continue BB and hydralazine BID   - additional antihypertensives as needed        VTE Prophylaxis: SCDs/TEDs, ambulation, SQ heparin  Code Status: Full Code    Dispo  - PT/OT recs --> low intensity   - Would benefit from homecare RN for cardiac surgery carepath  - Anticipate discharge 2-3 days days, pending completion 2V, echo and clinical progression   - Will continue to assess discharge needs      Leatha Harrison, APRN-CNP  Cardiac Surgery [facility]  [facility]  Team Phone [phone]    2025  7:49 AM                        [1] acetaminophen, 650 mg, oral, q6h  aspirin, 81 mg, oral, Daily  atorvastatin, 80 mg, oral, Nightly  heparin (porcine), 5,000  Units, subcutaneous, q8h  hydrALAZINE, 50 mg, oral, q8h  insulin glargine, 20 Units, subcutaneous, Daily  insulin lispro, 0-15 Units, subcutaneous, TID AC  metoprolol tartrate, 25 mg, oral, BID  pantoprazole, 40 mg, oral, Daily before breakfast  polyethylene glycol, 17 g, oral, BID  sennosides-docusate sodium, 2 tablet, oral, BID  [2]    [3] PRN medications: dextrose **OR** glucagon, hydrALAZINE, HYDROmorphone, naloxone, oxyCODONE, oxyCODONE, oxygen, oxygen, [DISCONTINUED] prochlorperazine **OR** prochlorperazine, simethicone

## 2025-06-02 NOTE — PROGRESS NOTES
Physical Therapy    Physical Therapy Treatment    Patient Name: Franky Sauceda  MRN: [id number]  Department: [facility]  Room: [address] [place] [address]  Today's Date: 6/2/2025  Time Calculation  Start Time: 1412  Stop Time: 1439  Time Calculation (min): 27 min    Assessment/Plan   PT Assessment  Barriers to Discharge Home: No anticipated barriers  End of Session Communication: Bedside nurse  Assessment Comment: Pt tolerated PT session well, able to complete increased ambulation and trial without use of AD this date. Pt also performed timed trial 5x STS in 39.39 seconds with use of UE support. Pt continues to remain appropriate for Low intensity PT upon D/C from hospital.  End of Session Patient Position: Bed, 3 rail up, Alarm off, caregiver present  PT Plan  Inpatient/Swing Bed or Outpatient: Inpatient  PT Plan  Treatment/Interventions: Bed mobility, Transfer training, Gait training, Stair training, Strengthening, Balance training, Endurance training, Therapeutic exercise, Therapeutic activity  PT Plan: Ongoing PT  PT Frequency: 3 times per week  PT Discharge Recommendations: Low intensity level of continued care  PT Recommended Transfer Status: Contact guard, Assistive device  PT - OK to Discharge: Yes    PT Visit Info:  PT Received On: 06/02/25     General Visit Information:   General  Family/Caregiver Present: Yes  Caregiver Feedback: Wife and daughter present  Prior to Session Communication: Bedside nurse  Patient Position Received: Up in chair, Alarm off, not on at start of session  General Comment: Pt seated up in chair, agreeable to work with PT    Subjective   Precautions:  Precautions  Medical Precautions: Cardiac precautions, Fall precautions, Oxygen therapy device and L/min, Chest tube  Post-Surgical Precautions: Move in the Tube  Precautions Comment: MAP 70-90, SpO2 >92%, VVI @ 35 (Reviewed precautions,)     Date/Time Vitals Session Patient Position Pulse Resp SpO2 BP MAP (mmHg)    06/02/25 1412 During PT  --  82  --   96 %  --  --           Objective   Pain:  Pain Assessment  Pain Assessment: 0-10  0-10 (Numeric) Pain Score: 0 - No pain  Cognition:  Cognition  Overall Cognitive Status: Within Functional Limits  Orientation Level: Oriented X4    Activity Tolerance:  Activity Tolerance  Endurance: Tolerates 10 - 20 min exercise with multiple rests  Treatments:  Therapeutic Activity  Therapeutic Activity Performed: Yes  Therapeutic Activity 1: Pt performed dynamic standing ~3-4 minutes while performing independent errol-care.  Therapeutic Activity 2: Pt performed 5x STS timed trial, with use of UE support off armrests. Timed Trial=39.39 Seconds.    Bed Mobility  Bed Mobility: Yes  Bed Mobility 1  Bed Mobility 1: Sitting to supine  Level of Assistance 1: Close supervision    Ambulation/Gait Training  Ambulation/Gait Training Performed: Yes  Ambulation/Gait Training 1  Surface 1: Level tile  Device 1: Rolling walker  Assistance 1: Close supervision  Quality of Gait 1: Decreased step length, Forward flexed posture (Decreased alonso)  Comments/Distance (ft) 1: x400ft, cues for safe activity pacing, x1 standing rest break to monitor vitals, O2 on 3L maintained >96%.  Ambulation/Gait Training 2  Surface 2: Level tile  Device 2: No device  Assistance 2: Contact guard  Quality of Gait 2: Wide base of support, Diminished heel strike, Decreased step length (Decreased alonso,)  Comments/Distance (ft) 2: 10ft x2 trials, pt with increased reach for external support throughout.    Transfers  Transfer: Yes  Transfer 1  Transfer From 1: Sit to  Transfer to 1: Stand  Technique 1: Sit to stand, Stand to sit  Transfer Device 1: Walker  Transfer Level of Assistance 1: Contact guard, Minimal verbal cues  Trials/Comments 1: x2 trials,  Transfers 2  Transfer From 2: Toilet to  Transfer to 2: Stand  Technique 2: Sit to stand, Stand to sit  Transfer Level of Assistance 2: Close supervision  Trials/Comments 2: x1 trial    Stairs  Stairs: No    Outcome  Measures:  [facility] Basic Mobility  Turning from your back to your side while in a flat bed without using bedrails: A little  Moving from lying on your back to sitting on the side of a flat bed without using bedrails: A little  Moving to and from bed to chair (including a wheelchair): A little  Standing up from a chair using your arms (e.g. wheelchair or bedside chair): A little  To walk in hospital room: A little  Climbing 3-5 steps with railing: A lot  Basic Mobility - Total Score: 17    Other Measures  5x Sit to Stand: 39.39 seconds    Education Documentation  Precautions, taught by Elisabeth Moore PTA at 6/2/2025  3:02 PM.  Learner: Family, Patient  Readiness: Acceptance  Method: Explanation  Response: Verbalizes Understanding  Comment: Progression of mobility, Mitt precautions.    Body Mechanics, taught by Elisabeth Moore PTA at 6/2/2025  3:02 PM.  Learner: Family, Patient  Readiness: Acceptance  Method: Explanation  Response: Verbalizes Understanding  Comment: Progression of mobility, Mitt precautions.    Mobility Training, taught by Elisabeth Moore PTA at 6/2/2025  3:02 PM.  Learner: Family, Patient  Readiness: Acceptance  Method: Explanation  Response: Verbalizes Understanding  Comment: Progression of mobility, Mitt precautions.    Education Comments  No comments found.      Encounter Problems       Encounter Problems (Active)       Balance       Pt will demonstrated ability to score at least 24/28 on the Tinetti balance assessment tool to ensure safety upon D/C.  (Progressing)       Start:  05/31/25    Expected End:  06/14/25               Mobility       Pt will demonstrated ability to ambulate >/=300ft with proper form, LRAD and no balance deficits for safe home going.   (Progressing)       Start:  05/31/25    Expected End:  06/14/25            Pt will demonstrate ability to ascend/descend 2-13 stairs with unilateral rail and no balance deficits for safe home going.  (Progressing)       Start:  05/31/25     Expected End:  06/14/25               PT Transfers       Pt will demonstrate ability to complete 5X STS in < 12 sec with good from and consistency between transfers for safe D/C.  (Progressing)       Start:  05/31/25    Expected End:  06/14/25 06/02/25 at 3:04 PM   Elisabeth Moore PTA   Rehab Office: [phone]

## 2025-06-02 NOTE — DISCHARGE INSTRUCTIONS
Don't forget to “Keep Your Move in the Tube!!”     Please refer to the “Move in the Tube” handout.  -- Load bearing activities can be completed if you are “staying in the tube.” If you are attempting load bearing activities, let pain be your guide with when trying an activity “out of the tube”. If an activity hurts or is uncomfortable go back to doing it while you stay “in the tube”. There is no time limit to “stay in the tube”.     -- Non-load bearing activities, which are your activities of daily living, can be completed with your arms “out of the tube” as long as you remain pain free. Some activities of daily living examples are dressing, personal care, showering, washing hair, and toilet hygiene.     Don't forget to KEEP YOUR MOVE IN THE TUBE and think of a LAINEY Adames dinosaur!                                 **IMPORTANT**  Prevention of Infective Endocarditis (Heart Infections) After Cardiac Surgery:    Infective endocarditis occurs when bacteria enters the bloodstream and then attaches to the heart. Patients with a new heart valve, repaired heart valve, or graft used to repair/replace their aorta are at higher risk for developing this because of the prosthetic (man-made) medical material in their heart. Endocarditis is rare but when you undergo certain medical or dental procedures, as listed below, it can give bacteria an opportunity to enter the blood and cause this type of infection. To prevent this, preventative antibiotics taken before these procedures are required.     Antibiotics are always required PRIOR to the following:  - Dental work with gingival, periapical, or other gingival perforation (such as tooth extractions, dental abscess drainage, and dental cleanings)  - Respiratory procedures with incisions or biopsies   - Cardiac or vascular procedures to place or replace prosthetic material (such as heart valves, stents, etc.)    Antibiotics are required in the following situations ONLY if an infection  is already present:  - Skin or soft tissue procedures with infected tissue  - Gastrointestinal procedures with GI infections  - Urinary or genital procedure with acute genitourinary infections    Antibiotic examples you should expect to be prescribed:  - Amoxicillin or Ampicillin are usually the  first choice  - Cephalexin, Clindamycin, or Vancomycin may be used if you are unable to tolerate/allergic to Amoxicillin or Ampicillin  - Amoxicillin or Ampicillin (if allergic, use Vancomycin) will cover for enterococcus if you have a known acute biliary tract infection   - Specific antibiotics for a known organism should be used when treating an active infection    Please notify your dentist/primary physician/cardiologist PRIOR to these types of procedures to obtain the proper antibiotics and prevent a serious infection in your heart. When in doubt, always ask!   Additionally, good oral hygiene (routine brushing, flossing, and dental care) and prompt treatment of other infections (such as UTIs and skin infections) are equally as important to prevent endocarditis!!      Additional Post-Operative Instructions:  - Remember to use your Incentive spirometer 10x/hr while awake. Remember to cough and deep breath.  - No NSAIDs (common over-the-counter NSAIDs are ibuprofen/Motrin/Advil, naproxen/Naprosyn/Aleve) for 3 months after cardiac surgery; if NSAIDs needed after 3 months, clear use with cardiologist before starting.       Your home medications may have changed after surgery. Carefully compare this list with your prescription bottles at home and set aside any medications you are told to not take so you do not confuse them. Do not dispose of any medications until your follow-up, since your doctors may restart some at your follow-up appointments. It is important to bring a complete, current list of your medications to any medical appointments or hospitalizations.    For any questions about your discharge, please call the  cardiac surgery office at [phone]

## 2025-06-02 NOTE — PROGRESS NOTES
06/02/25 1053   Discharge Planning   Living Arrangements Spouse/significant other   Support Systems Spouse/significant other   Assistance Needed independent   Type of Residence Private residence   Number of Stairs to Enter Residence 2   Number of Stairs Within Residence 0   Do you have animals or pets at home? No   Who is requesting discharge planning? Provider   Home or Post Acute Services None   Expected Discharge Disposition Home H  ([facility])   Does the patient need discharge transport arranged? No     Met with patient and introduced myself as Care Coordinator and member of the discharge planning team.  Patient is s/p AVR, CABGx 3. He plans to return home at time of discharge. He was independent in ADL's prior to hospitalization. He does not use a device but is interested in a walker for home use.  His PCP is RON Lucas. He uses [facility] in [place]. Home care options were offered. Patient prefers to use  Home Care. Care Coordinator will continue to follow for home going needs.

## 2025-06-02 NOTE — CARE PLAN
Problem: Safety - Adult  Goal: Free from fall injury  Outcome: Progressing     Problem: Discharge Planning  Goal: Discharge to home or other facility with appropriate resources  Outcome: Progressing     Problem: Chronic Conditions and Co-morbidities  Goal: Patient's chronic conditions and co-morbidity symptoms are monitored and maintained or improved  Outcome: Progressing     Problem: Nutrition  Goal: Nutrient intake appropriate for maintaining nutritional needs  Outcome: Progressing     Problem: Skin  Goal: Decreased wound size/increased tissue granulation at next dressing change  Outcome: Progressing  Goal: Participates in plan/prevention/treatment measures  Outcome: Progressing  Goal: Prevent/manage excess moisture  Outcome: Progressing  Goal: Prevent/minimize sheer/friction injuries  Outcome: Progressing  Goal: Promote/optimize nutrition  Outcome: Progressing  Goal: Promote skin healing  Outcome: Progressing   The patient's goals for the shift include      The clinical goals for the shift include

## 2025-06-02 NOTE — HOSPITAL COURSE
Franky Sauceda is a 74 y.o. male, with a PMH of  NIDDM, HTN, atypical atrial flutter, paroxysmal atrial fibrillation, CAD, and TIA, who presented to [facility] on 5/30/2025 for CABG x3 and AVR with Dr. Molina.         OPERATION/PROCEDURE: CABG x3 and AVR with Zaida Molina MD on 5/30/25  Median Sternotomy  CABG x 3: LIMA-LAD/ SVG-OM/SVG-PLV  AVR with 23mm Inspirus Resilia Bioprosthetic Aortic Valve  MAZE with Synergy Encompass  LAAC with AtriCure AtriClip 45mm  Posterior Pericardiotomy  Right Leg EVH  Sternal Closure with Wires and SternaLock XP Rigid Fixation System     CTICU Course: Uneventful   Transferred to  on 6/1/25      Floor Course:  - Patient was diuresed for fluid volume overload post cardiac surgery; Preop weight: 105 kg, discharge wt: 104 kg  - On ASA, statin, BB, by discharge  - Pt resumed on home Eliquis 6/4 per Dr. Molina   - Epicardial wires CUT on 6/5/25  - Telemetry at discharge: Sinus rhythm HR 80s-90s; Had rate-controlled Afib on 6/4 (pt has hx of pAF)   - 2v CXR done 6/3/25  - Postop echo done 6/3/25  - Cardiac rehab referral was placed  - PT recs low intensity therapy  - Anticipate discharge to home with homecare    Discharged on 6/5/25        On day of discharge, vital signs were stable and no acute distress was noted. All questions were answered. After VS and labs were reviewed it was determined the patient was stable for discharge.   Hospital day of discharge management- spent >30 minutes coordinating the discharge and counseling/educating patient and family regarding discharge instructions.  =============================================    Past Medical History       Diagnosis Date    Arrhythmia      Bence Huang proteinuria 07/27/2013     Bence Huang proteinuria    Coronary artery disease involving native coronary artery of native heart without angina pectoris 04/02/2024     CAC 2101 (2017)    Dorsalgia, unspecified 09/27/2016     Back pain, acute    Encounter for  immunization 06/29/2018     Need for shingles vaccine    Fatigue 04/10/2023    GERD (gastroesophageal reflux disease)      History of recent steroid use      Hyperlipidemia      Hypertension      Localized edema 07/07/2022     Edema of extremities    Moderate aortic stenosis 04/02/2024    PAF (paroxysmal atrial fibrillation) (Multi) 04/02/2024    Pain in left knee 12/08/2021     Acute pain of left knee    Pain in unspecified knee 04/28/2020     Joint pain, knee    Personal history of other diseases of the circulatory system       Personal history of coronary atherosclerosis    Personal history of other diseases of urinary system 07/07/2022     History of renal insufficiency syndrome    Personal history of other infectious and parasitic diseases 05/14/2018     History of herpes labialis    Severe aortic stenosis 04/02/2024    Sleep apnea       no CPAP being used    Sprain of other specified parts of left knee, initial encounter 01/02/2020     Injury of meniscus of left knee, initial encounter    Type 2 diabetes mellitus      Unilateral primary osteoarthritis, left knee 08/20/2019     Arthritis of knee, left       Past Surgical History        Procedure Laterality Date    CARDIAC CATHETERIZATION N/A 5/9/2025     Procedure: C, With LV;  Surgeon: Loy Soni MD;  Location: [facility] Cardiac Cath Lab;  Service: Cardiovascular;  Laterality: N/A;    CHOLECYSTECTOMY   01/28/2014     Cholecystectomy    COLONOSCOPY   01/28/2014     Complete Colonoscopy    ESOPHAGOGASTRODUODENOSCOPY   01/28/2014     Diagnostic Esophagogastroduodenoscopy               Medications Prior to Admission   Medication Sig Dispense Refill Last Dose/Taking    aspirin 81 mg EC tablet Take 1 tablet (81 mg) by mouth once daily. Do not fill before May 10, 2025. (Patient taking differently: Take 1 tablet (81 mg) by mouth once daily. Taking #1 tablet twice daily (temporary dose in prep of procedure)) 90 tablet 3 5/29/2025 at  6:00 PM    atorvastatin (Lipitor) 80  mg tablet Take 1 tablet (80 mg) by mouth once daily. 90 tablet 3 2025 at  6:00 PM    cholecalciferol (Vitamin D-3) 50 mcg (2,000 unit) capsule Take 1 capsule (50 mcg) by mouth once daily.     Past Week    metFORMIN (Glucophage) 500 mg tablet Take 2 tablets (1,000 mg) by mouth 2 times a day. 360 tablet 3 2025 at  6:00 PM    pantoprazole (ProtoNix) 40 mg EC tablet Take 1 tablet (40 mg) by mouth once daily. 90 tablet 3 2025 at  6:00 PM    Blood glucose monitoring meter kit kit 1 each if needed.          blood sugar diagnostic (True Metrix Glucose Test Strip) strip 100 strips once daily. 100 strip 3      [] chlorhexidine (Hibiclens) 4 % external liquid Apply topically once daily as needed for wound care for up to 5 days. 473 mL 0      [] chlorhexidine (Peridex) 0.12 % solution Use 15 mL in the mouth or throat if needed (Please rinse mouth night before surgey and morning of surgery) for up to 2 days. 473 mL 0      dilTIAZem ER (Tiazac) 300 mg 24 hr capsule Take 1 capsule (300 mg) by mouth once daily. 90 capsule 3      Eliquis 5 mg tablet Take 1 tablet (5 mg) by mouth 2 times a day. 180 tablet 3 2025    FreeStyle Jaun 3 Swiss misc Use as instructed 1 each 0      FreeStyle Jaun 3 Sensor device Change sensor every 14 days as directed. Rotate sites. 2 each 3      lancets misc 100 Lancets once daily. 100 each 3      losartan (Cozaar) 100 mg tablet Take 1 tablet (100 mg) by mouth once daily. 90 tablet 3      metoprolol succinate XL (Toprol-XL) 25 mg 24 hr tablet Take 1 tablet (25 mg) by mouth once daily. 90 tablet 3      nitroglycerin (Nitrostat) 0.4 mg SL tablet Place 1 tablet (0.4 mg) under the tongue every 5 minutes if needed for chest pain. 30 tablet 0      semaglutide (Ozempic) 0.25 mg or 0.5 mg (2 mg/3 mL) pen injector INJECT 0.5MG UNDER THE SKIN ONCE A WEEK AS DIRECTED 3 mL 2 2025     Allergies: Cerivastatin, Jardiance [empagliflozin], Mounjaro [tirzepatide], Nifedipine,  Statins-hmg-coa reductase inhibitors, and Icosapent ethyl      Social History        Tobacco Use    Smoking status: Never       Passive exposure: Never    Smokeless tobacco: Never   Vaping Use    Vaping status: Never Used   Substance Use Topics    Alcohol use: Not Currently    Drug use: Never       Family History         Problem Relation Name Age of Onset    Hypertension Mother        Other (abdominal aortic aneurysm) Father        Hyperlipidemia Father        Hypertension Father

## 2025-06-02 NOTE — SIGNIFICANT EVENT
1 mediastinal chest tube removed without difficulty. Patient tolerated well.    Stat 1V CXR ordered.    Leatha Harrison, APRN-CNP  Cardiac Surgery [facility]  [facility]  Team Pager [phone]

## 2025-06-03 ENCOUNTER — APPOINTMENT (OUTPATIENT)
Dept: CARDIOLOGY | Facility: HOSPITAL | Age: 75
DRG: 219 | End: 2025-06-03
Payer: COMMERCIAL

## 2025-06-03 ENCOUNTER — APPOINTMENT (OUTPATIENT)
Dept: RADIOLOGY | Facility: HOSPITAL | Age: 75
DRG: 219 | End: 2025-06-03
Payer: COMMERCIAL

## 2025-06-03 LAB
ALBUMIN SERPL BCP-MCNC: 3.2 G/DL (ref 3.4–5)
ANION GAP SERPL CALC-SCNC: 13 MMOL/L (ref 10–20)
BUN SERPL-MCNC: 31 MG/DL (ref 6–23)
CALCIUM SERPL-MCNC: 8.5 MG/DL (ref 8.6–10.6)
CHLORIDE SERPL-SCNC: 99 MMOL/L (ref 98–107)
CO2 SERPL-SCNC: 28 MMOL/L (ref 21–32)
CREAT SERPL-MCNC: 1.45 MG/DL (ref 0.5–1.3)
EGFRCR SERPLBLD CKD-EPI 2021: 50 ML/MIN/1.73M*2
ERYTHROCYTE [DISTWIDTH] IN BLOOD BY AUTOMATED COUNT: 13.8 % (ref 11.5–14.5)
GLUCOSE BLD MANUAL STRIP-MCNC: 133 MG/DL (ref 74–99)
GLUCOSE BLD MANUAL STRIP-MCNC: 142 MG/DL (ref 74–99)
GLUCOSE BLD MANUAL STRIP-MCNC: 152 MG/DL (ref 74–99)
GLUCOSE BLD MANUAL STRIP-MCNC: 160 MG/DL (ref 74–99)
GLUCOSE SERPL-MCNC: 171 MG/DL (ref 74–99)
HCT VFR BLD AUTO: 29.2 % (ref 41–52)
HGB BLD-MCNC: 9.7 G/DL (ref 13.5–17.5)
MCH RBC QN AUTO: 30.9 PG (ref 26–34)
MCHC RBC AUTO-ENTMCNC: 33.2 G/DL (ref 32–36)
MCV RBC AUTO: 93 FL (ref 80–100)
NRBC BLD-RTO: 0 /100 WBCS (ref 0–0)
PHOSPHATE SERPL-MCNC: 3.5 MG/DL (ref 2.5–4.9)
PLATELET # BLD AUTO: 134 X10*3/UL (ref 150–450)
POTASSIUM SERPL-SCNC: 3.9 MMOL/L (ref 3.5–5.3)
RBC # BLD AUTO: 3.14 X10*6/UL (ref 4.5–5.9)
SODIUM SERPL-SCNC: 136 MMOL/L (ref 136–145)
WBC # BLD AUTO: 11.8 X10*3/UL (ref 4.4–11.3)

## 2025-06-03 PROCEDURE — 80069 RENAL FUNCTION PANEL: CPT | Performed by: PHYSICIAN ASSISTANT

## 2025-06-03 PROCEDURE — 2500000001 HC RX 250 WO HCPCS SELF ADMINISTERED DRUGS (ALT 637 FOR MEDICARE OP): Performed by: NURSE PRACTITIONER

## 2025-06-03 PROCEDURE — 1200000002 HC GENERAL ROOM WITH TELEMETRY DAILY

## 2025-06-03 PROCEDURE — 2500000004 HC RX 250 GENERAL PHARMACY W/ HCPCS (ALT 636 FOR OP/ED)

## 2025-06-03 PROCEDURE — 2500000001 HC RX 250 WO HCPCS SELF ADMINISTERED DRUGS (ALT 637 FOR MEDICARE OP): Performed by: PHYSICIAN ASSISTANT

## 2025-06-03 PROCEDURE — 85027 COMPLETE CBC AUTOMATED: CPT | Performed by: PHYSICIAN ASSISTANT

## 2025-06-03 PROCEDURE — XXE2X19 MEASUREMENT OF CARDIAC OUTPUT, COMPUTER-AIDED ASSESSMENT, NEW TECHNOLOGY GROUP 9: ICD-10-PCS | Performed by: INTERNAL MEDICINE

## 2025-06-03 PROCEDURE — 71046 X-RAY EXAM CHEST 2 VIEWS: CPT

## 2025-06-03 PROCEDURE — 2500000004 HC RX 250 GENERAL PHARMACY W/ HCPCS (ALT 636 FOR OP/ED): Performed by: NURSE PRACTITIONER

## 2025-06-03 PROCEDURE — 2500000004 HC RX 250 GENERAL PHARMACY W/ HCPCS (ALT 636 FOR OP/ED): Performed by: PHYSICIAN ASSISTANT

## 2025-06-03 PROCEDURE — C8929 TTE W OR WO FOL WCON,DOPPLER: HCPCS

## 2025-06-03 PROCEDURE — 2500000002 HC RX 250 W HCPCS SELF ADMINISTERED DRUGS (ALT 637 FOR MEDICARE OP, ALT 636 FOR OP/ED): Performed by: NURSE PRACTITIONER

## 2025-06-03 PROCEDURE — 99231 SBSQ HOSP IP/OBS SF/LOW 25: CPT | Performed by: PHYSICIAN ASSISTANT

## 2025-06-03 PROCEDURE — 82947 ASSAY GLUCOSE BLOOD QUANT: CPT

## 2025-06-03 PROCEDURE — 93306 TTE W/DOPPLER COMPLETE: CPT | Performed by: INTERNAL MEDICINE

## 2025-06-03 PROCEDURE — 2500000001 HC RX 250 WO HCPCS SELF ADMINISTERED DRUGS (ALT 637 FOR MEDICARE OP)

## 2025-06-03 PROCEDURE — 2500000002 HC RX 250 W HCPCS SELF ADMINISTERED DRUGS (ALT 637 FOR MEDICARE OP, ALT 636 FOR OP/ED)

## 2025-06-03 PROCEDURE — 36415 COLL VENOUS BLD VENIPUNCTURE: CPT | Performed by: PHYSICIAN ASSISTANT

## 2025-06-03 RX ORDER — FUROSEMIDE 10 MG/ML
40 INJECTION INTRAMUSCULAR; INTRAVENOUS
Status: DISCONTINUED | OUTPATIENT
Start: 2025-06-03 | End: 2025-06-03

## 2025-06-03 RX ORDER — FUROSEMIDE 10 MG/ML
40 INJECTION INTRAMUSCULAR; INTRAVENOUS DAILY
Status: DISCONTINUED | OUTPATIENT
Start: 2025-06-04 | End: 2025-06-04

## 2025-06-03 RX ORDER — LOSARTAN POTASSIUM 25 MG/1
25 TABLET ORAL DAILY
Status: DISCONTINUED | OUTPATIENT
Start: 2025-06-03 | End: 2025-06-05 | Stop reason: HOSPADM

## 2025-06-03 RX ADMIN — INSULIN LISPRO 2 UNITS: 100 INJECTION, SOLUTION INTRAVENOUS; SUBCUTANEOUS at 17:27

## 2025-06-03 RX ADMIN — HYDRALAZINE HYDROCHLORIDE 50 MG: 50 TABLET ORAL at 08:40

## 2025-06-03 RX ADMIN — PANTOPRAZOLE SODIUM 40 MG: 40 TABLET, DELAYED RELEASE ORAL at 06:15

## 2025-06-03 RX ADMIN — METOPROLOL TARTRATE 25 MG: 25 TABLET, FILM COATED ORAL at 20:42

## 2025-06-03 RX ADMIN — LOSARTAN POTASSIUM 25 MG: 25 TABLET, FILM COATED ORAL at 12:15

## 2025-06-03 RX ADMIN — HEPARIN SODIUM 5000 UNITS: 5000 INJECTION INTRAVENOUS; SUBCUTANEOUS at 16:35

## 2025-06-03 RX ADMIN — ACETAMINOPHEN 650 MG: 325 TABLET ORAL at 08:41

## 2025-06-03 RX ADMIN — METOPROLOL TARTRATE 25 MG: 25 TABLET, FILM COATED ORAL at 08:40

## 2025-06-03 RX ADMIN — ACETAMINOPHEN 650 MG: 325 TABLET ORAL at 16:35

## 2025-06-03 RX ADMIN — INSULIN GLARGINE 20 UNITS: 100 INJECTION, SOLUTION SUBCUTANEOUS at 08:35

## 2025-06-03 RX ADMIN — Medication 1 TABLET: at 08:52

## 2025-06-03 RX ADMIN — INSULIN LISPRO 2 UNITS: 100 INJECTION, SOLUTION INTRAVENOUS; SUBCUTANEOUS at 12:15

## 2025-06-03 RX ADMIN — PERFLUTREN 4 ML OF DILUTION: 6.52 INJECTION, SUSPENSION INTRAVENOUS at 09:51

## 2025-06-03 RX ADMIN — FUROSEMIDE 40 MG: 10 INJECTION, SOLUTION INTRAVENOUS at 08:38

## 2025-06-03 RX ADMIN — ATORVASTATIN CALCIUM 80 MG: 80 TABLET, FILM COATED ORAL at 20:42

## 2025-06-03 RX ADMIN — HEPARIN SODIUM 5000 UNITS: 5000 INJECTION INTRAVENOUS; SUBCUTANEOUS at 06:15

## 2025-06-03 RX ADMIN — ASPIRIN 81 MG: 81 TABLET, CHEWABLE ORAL at 08:40

## 2025-06-03 ASSESSMENT — COGNITIVE AND FUNCTIONAL STATUS - GENERAL
CLIMB 3 TO 5 STEPS WITH RAILING: A LOT
HELP NEEDED FOR BATHING: A LITTLE
DAILY ACTIVITIY SCORE: 20
TOILETING: A LITTLE
DRESSING REGULAR LOWER BODY CLOTHING: A LITTLE
MOBILITY SCORE: 19
MOVING TO AND FROM BED TO CHAIR: A LITTLE
STANDING UP FROM CHAIR USING ARMS: A LITTLE
WALKING IN HOSPITAL ROOM: A LITTLE
DRESSING REGULAR UPPER BODY CLOTHING: A LITTLE

## 2025-06-03 ASSESSMENT — PAIN - FUNCTIONAL ASSESSMENT
PAIN_FUNCTIONAL_ASSESSMENT: 0-10
PAIN_FUNCTIONAL_ASSESSMENT: 0-10

## 2025-06-03 ASSESSMENT — PAIN SCALES - GENERAL
PAINLEVEL_OUTOF10: 0 - NO PAIN
PAINLEVEL_OUTOF10: 0 - NO PAIN

## 2025-06-03 NOTE — PROGRESS NOTES
"CARDIAC SURGERY DAILY PROGRESS NOTE    Franky Sauceda is a 74 y.o. male, with a PMH of  NIDDM, HTN, atypical atrial flutter, paroxysmal atrial fibrillation, CAD, and TIA, who presented to [facility] on 5/30/2025 for CABG x3 and AVR with Dr. Molina.       OPERATION/PROCEDURE: CABG x3 and AVR with Zaida Molina MD on 5/30/25  Median Sternotomy  CABG x 3: LIMA-LAD/ SVG-OM/SVG-PLV  AVR with 23mm Inspirus Resilia Bioprosthetic Aortic Valve  MAZE with Synergy Encompass  LAAC with AtriCure AtriClip 45mm  Posterior Pericardiotomy  Right Leg EVH  Sternal Closure with Wires and SternaLock XP Rigid Fixation System    CTICU Course: Uneventful   Transferred to T3 on 6/1/25    ======================  Interval History:   No acute events overnight     SUBJECTIVE:  Sitting up in chair. On 4L NC. Pt reports well controlled. Denies having BM    Objective   /76   Pulse 99   Temp 37 °C (98.6 °F) (Temporal)   Resp 18   Ht 1.753 m (5' 9\")   Wt 109 kg (240 lb 8 oz)   SpO2 95%   BMI 35.52 kg/m²   0-10 (Numeric) Pain Score: 0 - No pain   3 Day Weight Change: Unable to Calculate    Intake and Output    Intake/Output Summary (Last 24 hours) at 6/3/2025 0910  Last data filed at 6/3/2025 0336  Gross per 24 hour   Intake --   Output 1600 ml   Net -1600 ml       Physical Exam  Physical Exam  Vitals and nursing note reviewed.   Constitutional:       General: He is not in acute distress.  HENT:      Head: Normocephalic and atraumatic.      Mouth/Throat:      Mouth: Mucous membranes are moist.   Eyes:      Conjunctiva/sclera: Conjunctivae normal.   Cardiovascular:      Rate and Rhythm: Normal rate and regular rhythm.      Pulses: Normal pulses.      Heart sounds: Normal heart sounds.      Comments: SR HR 70s-90s  VVI @35, 4  Pulmonary:      Effort: Pulmonary effort is normal.      Comments: 4L NC   Sternum stable  Diminished bases   Abdominal:      Comments: Awaiting post op BM   Genitourinary:     Comments: Voids " independently   Musculoskeletal:         General: Normal range of motion.      Cervical back: Neck supple.      Right lower leg: Edema (trace) present.      Left lower leg: Edema (trace) present.      Comments: JORGENSEN   Skin:     General: Skin is warm.      Capillary Refill: Capillary refill takes less than 2 seconds.      Comments: midsternal chest incision C/D/I, well approximated, no signs infection     Neurological:      General: No focal deficit present.      Mental Status: He is alert and oriented to person, place, and time.   Psychiatric:         Attention and Perception: Attention normal.         Mood and Affect: Mood normal.         Speech: Speech normal.         Cognition and Memory: Cognition normal.           Medications  Scheduled medications  Scheduled Medications[1]Continuous medications  Continuous Medications[2]PRN medications  PRN Medications[3]    Labs  Results for orders placed or performed during the hospital encounter of 05/30/25 (from the past 24 hours)   POCT GLUCOSE   Result Value Ref Range    POCT Glucose 170 (H) 74 - 99 mg/dL   POCT GLUCOSE   Result Value Ref Range    POCT Glucose 115 (H) 74 - 99 mg/dL   POCT GLUCOSE   Result Value Ref Range    POCT Glucose 271 (H) 74 - 99 mg/dL   POCT GLUCOSE   Result Value Ref Range    POCT Glucose 142 (H) 74 - 99 mg/dL   POCT GLUCOSE   Result Value Ref Range    POCT Glucose 142 (H) 74 - 99 mg/dL               IMAGING/ DIAGNOSTIC TESTING:  I have personally reviewed the following test result(s):    XR chest 1 view 06/2/2025  Read pending     XR chest 1 view 06/1/2025   Read pending     XR chest 1 view 05/31/2025  Impression  1. Low lung volume with small left pleural effusion/left basilar  atelectasis.  2. Medical devices as detailed above.      ===============  IMPRESSION & PLAN:  ==============  POD # 4 s/p CABG x3, AVR, MAZE and LAAC with Dr. Molina   - Increase activity/ ambulation; PT/OT  - Encourage IS, C/DB; respiratory therapy; wean O2 as lino   -  Cardiac rehab referral   - Continue cardiac meds: ASA,, statin   - Pain and anticonstipation meds  - 2v CXR ordered for 6/3/25  - Postop echo ordered for 6/3/25   - Cut epicardial wires prior to discharge   - Tele until discharge  - Optimize nutrition and electrolytes    Acute post op pain  - Continue scheduled tylenol and PRN oxycodone    Hx of pAF (pt on Eliquis at home)   - Tele: Sinus rhythm   - Continue metoprolol    Acute Blood Loss Anemia- stable  Recent Labs     25  0700 25  1142 25  0146 25  1251 25  0026 25  1429 25  0926   HGB 10.2* 10.5* 10.3* 9.9* 9.9* 10.7* 13.3*   HCT 31.8* 32.3* 31.6* 30.5* 31.1* 32.9* 40.5*   - MV, PO Iron x1mo  - Daily labs, transfuse as indicated    Thrombocytopenia- stable  Recent Labs     25  0700 25  1142 25  0146 25  12525  0026 25  14225  0926   PLT 94* 100* 91* 109* 111* 141* 228   - Etiology likely postop/CPB related  - Continue to trend with daily CBCs    Volume/Electrolyte Status: Preop wt Weight: 105 kg (230 lb 8.9 oz)   Total body fluid overload  Vitals:    25 0105   Weight: 109 kg (240 lb 8 oz)   - Replete electrolytes for hypokalemia/hypomagnesemia/hypophosphatemia as needed   - Daily weights and strict I&Os  - Daily RFP while admitted  - Lasix 40mg BID today    Leukocytosis- likely inflammatory  Recent Labs     25  0700 25  1142 25  0146 25  1251 25  0026 25  1429 25  0926   WBC 16.8* 23.2* 22.8* 20.4* 11.6* 18.5* 6.9     Temp (36hrs), Av.8 °C (98.3 °F), Min:36.2 °C (97.2 °F), Max:37.2 °C (99 °F)  - daily CBC to follow      NIDDM: home meds: Metformin 500 mg BID, Ozempic 0.25 mg injection weekly   Lab Results   Component Value Date    HGBA1C 7.0 (H) 2025     Results from last 7 days   Lab Units 25  0740 25  2303 25  2031 25  1634 25  1157 25  2123 25  1629   POCT GLUCOSE mg/dL 142*  142* 271* 115* 170* 107* 176*   -accuchecks premeal and at bedtime  -diabetic diet  - lantus and lispro premeal corrective scale for now  - Will transition to meformin and ozempic when creatine appropriate    Hypertension: home meds: Losartan 100 mg daily, Toprol XL 25 mg daily, Diltiazem  mg daily    Systolic (24hrs), Av , Min:115 , Max:134   - Will transition hydralazine to Losartan 25mg today  - additional antihypertensives as needed      VTE Prophylaxis: SCDs/TEDs, ambulation, SQ heparin  Code Status: Full Code    Dispo  - PT/OT recs --> low intensity   - Would benefit from homecare RN for cardiac surgery carepath  - Anticipate discharge 2-3 days days, pending , echo and weaning off of O2  - Will continue to assess discharge needs      Billy Blakely PA-C  Cardiac Surgery [facility]  [facility]  Team Phone [phone]    6/3/2025  9:10 AM                        [1] acetaminophen, 650 mg, oral, q6h  aspirin, 81 mg, oral, Daily  atorvastatin, 80 mg, oral, Nightly  furosemide, 40 mg, intravenous, BID  heparin (porcine), 5,000 Units, subcutaneous, q8h  hydrALAZINE, 50 mg, oral, BID  insulin glargine, 20 Units, subcutaneous, Daily  insulin lispro, 0-10 Units, subcutaneous, TID  metoprolol tartrate, 25 mg, oral, BID  multivitamin with minerals, 1 tablet, oral, Daily  pantoprazole, 40 mg, oral, Daily before breakfast  polyethylene glycol, 17 g, oral, BID  sennosides-docusate sodium, 2 tablet, oral, BID     [2]    [3] PRN medications: bisacodyl, dextrose **OR** glucagon, HYDROmorphone, naloxone, oxyCODONE, oxyCODONE, oxygen, oxygen, [DISCONTINUED] prochlorperazine **OR** prochlorperazine, simethicone

## 2025-06-04 ENCOUNTER — APPOINTMENT (OUTPATIENT)
Dept: RADIOLOGY | Facility: HOSPITAL | Age: 75
DRG: 219 | End: 2025-06-04
Payer: COMMERCIAL

## 2025-06-04 LAB
ALBUMIN SERPL BCP-MCNC: 2.9 G/DL (ref 3.4–5)
ANION GAP SERPL CALC-SCNC: 15 MMOL/L (ref 10–20)
AORTIC VALVE MEAN GRADIENT: 22 MMHG
AORTIC VALVE PEAK VELOCITY: 3.29 M/S
AV PEAK GRADIENT: 43 MMHG
AVA (PEAK VEL): 1.48 CM2
AVA (VTI): 1.54 CM2
BODY SURFACE AREA: 2.26 M2
BUN SERPL-MCNC: 32 MG/DL (ref 6–23)
CALCIUM SERPL-MCNC: 8.2 MG/DL (ref 8.6–10.6)
CHLORIDE SERPL-SCNC: 102 MMOL/L (ref 98–107)
CO2 SERPL-SCNC: 25 MMOL/L (ref 21–32)
CREAT SERPL-MCNC: 1.39 MG/DL (ref 0.5–1.3)
EGFRCR SERPLBLD CKD-EPI 2021: 53 ML/MIN/1.73M*2
EJECTION FRACTION APICAL 4 CHAMBER: 65.6
EJECTION FRACTION: 73 %
ERYTHROCYTE [DISTWIDTH] IN BLOOD BY AUTOMATED COUNT: 13.7 % (ref 11.5–14.5)
GLUCOSE BLD MANUAL STRIP-MCNC: 161 MG/DL (ref 74–99)
GLUCOSE BLD MANUAL STRIP-MCNC: 167 MG/DL (ref 74–99)
GLUCOSE BLD MANUAL STRIP-MCNC: 203 MG/DL (ref 74–99)
GLUCOSE SERPL-MCNC: 113 MG/DL (ref 74–99)
HCT VFR BLD AUTO: 27.9 % (ref 41–52)
HGB BLD-MCNC: 9.3 G/DL (ref 13.5–17.5)
LEFT ATRIUM VOLUME AREA LENGTH INDEX BSA: 30.7 ML/M2
LEFT VENTRICLE INTERNAL DIMENSION DIASTOLE: 4.3 CM (ref 3.5–6)
LEFT VENTRICULAR OUTFLOW TRACT DIAMETER: 2.1 CM
MCH RBC QN AUTO: 30.5 PG (ref 26–34)
MCHC RBC AUTO-ENTMCNC: 33.3 G/DL (ref 32–36)
MCV RBC AUTO: 92 FL (ref 80–100)
MITRAL VALVE E/A RATIO: 2.06
NRBC BLD-RTO: 0 /100 WBCS (ref 0–0)
PHOSPHATE SERPL-MCNC: 3.2 MG/DL (ref 2.5–4.9)
PLATELET # BLD AUTO: 140 X10*3/UL (ref 150–450)
POTASSIUM SERPL-SCNC: 4.1 MMOL/L (ref 3.5–5.3)
RBC # BLD AUTO: 3.05 X10*6/UL (ref 4.5–5.9)
RIGHT VENTRICLE FREE WALL PEAK S': 9.25 CM/S
RIGHT VENTRICLE PEAK SYSTOLIC PRESSURE: 35 MMHG
SODIUM SERPL-SCNC: 138 MMOL/L (ref 136–145)
TRICUSPID ANNULAR PLANE SYSTOLIC EXCURSION: 1.5 CM
WBC # BLD AUTO: 11.2 X10*3/UL (ref 4.4–11.3)

## 2025-06-04 PROCEDURE — 82947 ASSAY GLUCOSE BLOOD QUANT: CPT

## 2025-06-04 PROCEDURE — 85027 COMPLETE CBC AUTOMATED: CPT | Performed by: PHYSICIAN ASSISTANT

## 2025-06-04 PROCEDURE — 2500000004 HC RX 250 GENERAL PHARMACY W/ HCPCS (ALT 636 FOR OP/ED)

## 2025-06-04 PROCEDURE — 2500000004 HC RX 250 GENERAL PHARMACY W/ HCPCS (ALT 636 FOR OP/ED): Performed by: NURSE PRACTITIONER

## 2025-06-04 PROCEDURE — 2500000001 HC RX 250 WO HCPCS SELF ADMINISTERED DRUGS (ALT 637 FOR MEDICARE OP): Performed by: PHYSICIAN ASSISTANT

## 2025-06-04 PROCEDURE — 1200000002 HC GENERAL ROOM WITH TELEMETRY DAILY

## 2025-06-04 PROCEDURE — 71045 X-RAY EXAM CHEST 1 VIEW: CPT | Performed by: RADIOLOGY

## 2025-06-04 PROCEDURE — 2500000002 HC RX 250 W HCPCS SELF ADMINISTERED DRUGS (ALT 637 FOR MEDICARE OP, ALT 636 FOR OP/ED): Performed by: NURSE PRACTITIONER

## 2025-06-04 PROCEDURE — 2500000001 HC RX 250 WO HCPCS SELF ADMINISTERED DRUGS (ALT 637 FOR MEDICARE OP)

## 2025-06-04 PROCEDURE — 84520 ASSAY OF UREA NITROGEN: CPT | Performed by: PHYSICIAN ASSISTANT

## 2025-06-04 PROCEDURE — 71045 X-RAY EXAM CHEST 1 VIEW: CPT

## 2025-06-04 PROCEDURE — 36415 COLL VENOUS BLD VENIPUNCTURE: CPT | Performed by: PHYSICIAN ASSISTANT

## 2025-06-04 PROCEDURE — 82374 ASSAY BLOOD CARBON DIOXIDE: CPT | Performed by: PHYSICIAN ASSISTANT

## 2025-06-04 PROCEDURE — 99232 SBSQ HOSP IP/OBS MODERATE 35: CPT

## 2025-06-04 PROCEDURE — 2500000002 HC RX 250 W HCPCS SELF ADMINISTERED DRUGS (ALT 637 FOR MEDICARE OP, ALT 636 FOR OP/ED)

## 2025-06-04 PROCEDURE — 2500000001 HC RX 250 WO HCPCS SELF ADMINISTERED DRUGS (ALT 637 FOR MEDICARE OP): Performed by: NURSE PRACTITIONER

## 2025-06-04 PROCEDURE — 2500000004 HC RX 250 GENERAL PHARMACY W/ HCPCS (ALT 636 FOR OP/ED): Performed by: PHYSICIAN ASSISTANT

## 2025-06-04 RX ORDER — OXYCODONE HYDROCHLORIDE 5 MG/1
5 TABLET ORAL EVERY 4 HOURS PRN
Refills: 0 | Status: DISCONTINUED | OUTPATIENT
Start: 2025-06-04 | End: 2025-06-05 | Stop reason: HOSPADM

## 2025-06-04 RX ORDER — FUROSEMIDE 40 MG/1
40 TABLET ORAL DAILY
Status: DISCONTINUED | OUTPATIENT
Start: 2025-06-05 | End: 2025-06-05 | Stop reason: HOSPADM

## 2025-06-04 RX ORDER — HEPARIN SODIUM 5000 [USP'U]/ML
5000 INJECTION, SOLUTION INTRAVENOUS; SUBCUTANEOUS EVERY 8 HOURS
Status: COMPLETED | OUTPATIENT
Start: 2025-06-04 | End: 2025-06-04

## 2025-06-04 RX ADMIN — HEPARIN SODIUM 5000 UNITS: 5000 INJECTION, SOLUTION INTRAVENOUS; SUBCUTANEOUS at 14:46

## 2025-06-04 RX ADMIN — ACETAMINOPHEN 650 MG: 325 TABLET ORAL at 14:46

## 2025-06-04 RX ADMIN — INSULIN LISPRO 2 UNITS: 100 INJECTION, SOLUTION INTRAVENOUS; SUBCUTANEOUS at 18:00

## 2025-06-04 RX ADMIN — PANTOPRAZOLE SODIUM 40 MG: 40 TABLET, DELAYED RELEASE ORAL at 07:16

## 2025-06-04 RX ADMIN — ACETAMINOPHEN 650 MG: 325 TABLET ORAL at 20:36

## 2025-06-04 RX ADMIN — INSULIN LISPRO 2 UNITS: 100 INJECTION, SOLUTION INTRAVENOUS; SUBCUTANEOUS at 11:59

## 2025-06-04 RX ADMIN — ATORVASTATIN CALCIUM 80 MG: 80 TABLET, FILM COATED ORAL at 20:36

## 2025-06-04 RX ADMIN — HEPARIN SODIUM 5000 UNITS: 5000 INJECTION INTRAVENOUS; SUBCUTANEOUS at 00:10

## 2025-06-04 RX ADMIN — ASPIRIN 81 MG: 81 TABLET, CHEWABLE ORAL at 08:41

## 2025-06-04 RX ADMIN — ACETAMINOPHEN 650 MG: 325 TABLET ORAL at 00:10

## 2025-06-04 RX ADMIN — HEPARIN SODIUM 5000 UNITS: 5000 INJECTION INTRAVENOUS; SUBCUTANEOUS at 09:01

## 2025-06-04 RX ADMIN — LOSARTAN POTASSIUM 25 MG: 25 TABLET, FILM COATED ORAL at 08:41

## 2025-06-04 RX ADMIN — APIXABAN 5 MG: 5 TABLET, FILM COATED ORAL at 20:36

## 2025-06-04 RX ADMIN — METOPROLOL TARTRATE 25 MG: 25 TABLET, FILM COATED ORAL at 20:36

## 2025-06-04 RX ADMIN — ACETAMINOPHEN 650 MG: 325 TABLET ORAL at 07:17

## 2025-06-04 RX ADMIN — INSULIN GLARGINE 20 UNITS: 100 INJECTION, SOLUTION SUBCUTANEOUS at 08:42

## 2025-06-04 RX ADMIN — Medication 1 TABLET: at 08:41

## 2025-06-04 RX ADMIN — INSULIN LISPRO 4 UNITS: 100 INJECTION, SOLUTION INTRAVENOUS; SUBCUTANEOUS at 08:41

## 2025-06-04 RX ADMIN — FUROSEMIDE 40 MG: 10 INJECTION, SOLUTION INTRAMUSCULAR; INTRAVENOUS at 08:41

## 2025-06-04 RX ADMIN — METOPROLOL TARTRATE 25 MG: 25 TABLET, FILM COATED ORAL at 08:41

## 2025-06-04 ASSESSMENT — PAIN SCALES - GENERAL
PAINLEVEL_OUTOF10: 1
PAINLEVEL_OUTOF10: 1

## 2025-06-04 ASSESSMENT — PAIN - FUNCTIONAL ASSESSMENT
PAIN_FUNCTIONAL_ASSESSMENT: 0-10
PAIN_FUNCTIONAL_ASSESSMENT: 0-10

## 2025-06-04 ASSESSMENT — COGNITIVE AND FUNCTIONAL STATUS - GENERAL
STANDING UP FROM CHAIR USING ARMS: A LITTLE
DRESSING REGULAR LOWER BODY CLOTHING: A LITTLE
WALKING IN HOSPITAL ROOM: A LITTLE
MOVING TO AND FROM BED TO CHAIR: A LITTLE
CLIMB 3 TO 5 STEPS WITH RAILING: A LOT
DAILY ACTIVITIY SCORE: 20
TOILETING: A LITTLE
MOBILITY SCORE: 19
DRESSING REGULAR UPPER BODY CLOTHING: A LITTLE
HELP NEEDED FOR BATHING: A LITTLE

## 2025-06-04 ASSESSMENT — PAIN DESCRIPTION - DESCRIPTORS
DESCRIPTORS: ACHING
DESCRIPTORS: ACHING

## 2025-06-04 NOTE — PROGRESS NOTES
"CARDIAC SURGERY DAILY PROGRESS NOTE    Franky Sauceda is a 74 y.o. male, with a PMH of  NIDDM, HTN, atypical atrial flutter, paroxysmal atrial fibrillation, CAD, and TIA, who presented to [facility] on 5/30/2025 for CABG x3 and AVR with Dr. Molina.       OPERATION/PROCEDURE: CABG x3 and AVR with Zaida Molina MD on 5/30/25  Median Sternotomy  CABG x 3: LIMA-LAD/ SVG-OM/SVG-PLV  AVR with 23mm Inspirus Resilia Bioprosthetic Aortic Valve  MAZE with Synergy Encompass  LAAC with AtriCure AtriClip 45mm  Posterior Pericardiotomy  Right Leg EVH  Sternal Closure with Wires and SternaLock XP Rigid Fixation System    CTICU Course: Uneventful   Transferred to T3 on 6/1/25    ======================  Interval History:   No acute events overnight     SUBJECTIVE:  Sitting up in chair. On RA. No complaints this AM     Objective   /70 (BP Location: Right arm, Patient Position: Lying)   Pulse 96   Temp 36.8 °C (98.2 °F) (Temporal)   Resp 18   Ht 1.753 m (5' 9\")   Wt 105 kg (230 lb 7.2 oz)   SpO2 93%   BMI 34.03 kg/m²   0-10 (Numeric) Pain Score: 1   3 Day Weight Change: Unable to Calculate    Intake and Output    Intake/Output Summary (Last 24 hours) at 6/4/2025 1017  Last data filed at 6/4/2025 0820  Gross per 24 hour   Intake 370 ml   Output 1300 ml   Net -930 ml       Physical Exam  Physical Exam  Vitals and nursing note reviewed.   Constitutional:       General: He is not in acute distress.  HENT:      Head: Normocephalic and atraumatic.      Mouth/Throat:      Mouth: Mucous membranes are moist.   Eyes:      Conjunctiva/sclera: Conjunctivae normal.   Cardiovascular:      Rate and Rhythm: Regular rhythm.      Pulses: Normal pulses.      Heart sounds: Normal heart sounds.      Comments: Afib HR low 90s-100s this AM  SR HR 90s overnight   Wires capped   Pulmonary:      Effort: Pulmonary effort is normal.      Comments: RA  Sternum stable  Diminished bases   Abdominal:      General: Abdomen is flat.     "  Palpations: Abdomen is soft.      Comments: BM 6/3   Genitourinary:     Comments: Voids independently   Musculoskeletal:         General: Normal range of motion.      Cervical back: Neck supple.      Right lower leg: Edema (trace) present.      Left lower leg: Edema (trace) present.      Comments: JORGENSEN   Skin:     General: Skin is warm.      Capillary Refill: Capillary refill takes less than 2 seconds.      Comments: midsternal chest incision C/D/I, well approximated, no signs infection  right SVG incision C/D/I, well approximated, no s/s infection       Neurological:      General: No focal deficit present.      Mental Status: He is alert and oriented to person, place, and time.   Psychiatric:         Attention and Perception: Attention normal.         Mood and Affect: Mood normal.         Speech: Speech normal.         Behavior: Behavior is cooperative.         Cognition and Memory: Cognition normal.           Medications  Scheduled medications  Scheduled Medications[1]Continuous medications  Continuous Medications[2]PRN medications  PRN Medications[3]    Labs  Results for orders placed or performed during the hospital encounter of 05/30/25 (from the past 24 hours)   CBC   Result Value Ref Range    WBC 11.8 (H) 4.4 - 11.3 x10*3/uL    nRBC 0.0 0.0 - 0.0 /100 WBCs    RBC 3.14 (L) 4.50 - 5.90 x10*6/uL    Hemoglobin 9.7 (L) 13.5 - 17.5 g/dL    Hematocrit 29.2 (L) 41.0 - 52.0 %    MCV 93 80 - 100 fL    MCH 30.9 26.0 - 34.0 pg    MCHC 33.2 32.0 - 36.0 g/dL    RDW 13.8 11.5 - 14.5 %    Platelets 134 (L) 150 - 450 x10*3/uL   Renal function panel   Result Value Ref Range    Glucose 171 (H) 74 - 99 mg/dL    Sodium 136 136 - 145 mmol/L    Potassium 3.9 3.5 - 5.3 mmol/L    Chloride 99 98 - 107 mmol/L    Bicarbonate 28 21 - 32 mmol/L    Anion Gap 13 10 - 20 mmol/L    Urea Nitrogen 31 (H) 6 - 23 mg/dL    Creatinine 1.45 (H) 0.50 - 1.30 mg/dL    eGFR 50 (L) >60 mL/min/1.73m*2    Calcium 8.5 (L) 8.6 - 10.6 mg/dL    Phosphorus 3.5  2.5 - 4.9 mg/dL    Albumin 3.2 (L) 3.4 - 5.0 g/dL   POCT GLUCOSE   Result Value Ref Range    POCT Glucose 160 (H) 74 - 99 mg/dL   POCT GLUCOSE   Result Value Ref Range    POCT Glucose 152 (H) 74 - 99 mg/dL   POCT GLUCOSE   Result Value Ref Range    POCT Glucose 133 (H) 74 - 99 mg/dL   CBC   Result Value Ref Range    WBC 11.2 4.4 - 11.3 x10*3/uL    nRBC 0.0 0.0 - 0.0 /100 WBCs    RBC 3.05 (L) 4.50 - 5.90 x10*6/uL    Hemoglobin 9.3 (L) 13.5 - 17.5 g/dL    Hematocrit 27.9 (L) 41.0 - 52.0 %    MCV 92 80 - 100 fL    MCH 30.5 26.0 - 34.0 pg    MCHC 33.3 32.0 - 36.0 g/dL    RDW 13.7 11.5 - 14.5 %    Platelets 140 (L) 150 - 450 x10*3/uL   Renal Function Panel   Result Value Ref Range    Glucose 113 (H) 74 - 99 mg/dL    Sodium 138 136 - 145 mmol/L    Potassium 4.1 3.5 - 5.3 mmol/L    Chloride 102 98 - 107 mmol/L    Bicarbonate 25 21 - 32 mmol/L    Anion Gap 15 10 - 20 mmol/L    Urea Nitrogen 32 (H) 6 - 23 mg/dL    Creatinine 1.39 (H) 0.50 - 1.30 mg/dL    eGFR 53 (L) >60 mL/min/1.73m*2    Calcium 8.2 (L) 8.6 - 10.6 mg/dL    Phosphorus 3.2 2.5 - 4.9 mg/dL    Albumin 2.9 (L) 3.4 - 5.0 g/dL   POCT GLUCOSE   Result Value Ref Range    POCT Glucose 203 (H) 74 - 99 mg/dL               IMAGING/ DIAGNOSTIC TESTING:  I have personally reviewed the following test result(s):    Transthoracic Echo (TTE) Complete With Contrast 06/03/2025  CONCLUSIONS:  1. Left ventricular ejection fraction is hyperdynamic by visual estimate at 70-75%.  2. Poorly visualized anatomical structures due to suboptimal image quality.  3. Spectral Doppler shows an abnormal pattern of left ventricular diastolic filling.  4. Abnormal septal motion consistent with post-operative status.  5. No left ventricular thrombus visualized.  6. There is low normal right ventricular systolic function.  7. Small pericardial effusion.  8. The doppler estimated RVSP is mildly elevated with a right ventricular systolic pressure of 40 mmHg.  9. S/p 23mm Inspiris Sumaa  bioprosthetic AVR with gradients of 43/22mmHg and DI of 0.44 and no AI.  10. There is an Rojas bioprosthetic type aortic valve bioprosthesis with a 23 mm reported size. The peak and mean gradients are 43 mmHg and 22 mmHg respectively.  11. Compared with study dated 5/30/2025, the prior study was an intra-operative CHAD, the gradients post AVR across the valve were not well assessed. The mean gradient at that time was estimated at 18mmHg and has now slightly increased to 22mmHg, but the LV is quite dynamic today. This is the first postoperative TTE following AVR for severe AS.      XR chest 2 views 06/03/2025  Read pending       XR chest 1 view 06/02/2025  Impression  1. No sizable pneumothorax.  2. Similar bibasilar atelectasis with bilateral pleural effusions  left more than right.  3. Medical devices as detailed.        XR chest 1 view 05/31/2025  Impression  1. Low lung volume with small left pleural effusion/left basilar  atelectasis.  2. Medical devices as detailed above.      ===============  IMPRESSION & PLAN:  ==============  POD # 5 s/p CABG x3, AVR, MAZE and LAAC with Dr. Molina   - Increase activity/ ambulation; PT/OT  - Encourage IS, C/DB; respiratory therapy; wean O2 as lino   - Cardiac rehab referral   - Continue cardiac meds: ASA,, statin   - Pain and anticonstipation meds  - 2v CXR done  - Postop echo done   - Cut epicardial wires prior to discharge   - Tele until discharge  - Optimize nutrition and electrolytes    Acute post op pain  - Continue scheduled tylenol and PRN oxycodone    Hx of pAF (pt on Eliquis at home)   - Tele: 6/3 AF HF low 100s (was SR overnight and yesterday)  - Continue metoprolol  - 6/4: per Dr. Molina, ok to start home eliquis today    Acute Blood Loss Anemia- stable  Recent Labs     06/04/25  0734 06/03/25  1128 06/02/25  0700 06/01/25  1142 06/01/25  0146 05/31/25  1251 05/31/25  0026   HGB 9.3* 9.7* 10.2* 10.5* 10.3* 9.9* 9.9*   HCT 27.9* 29.2* 31.8* 32.3* 31.6* 30.5* 31.1*   -  MV, PO Iron x1mo  - Daily labs, transfuse as indicated    Thrombocytopenia- stable  Recent Labs     25  0734 25  1128 25  0700 25  1142 25  0146 25  1251 25  0026   * 134* 94* 100* 91* 109* 111*   - Etiology likely postop/CPB related  - Continue to trend with daily CBCs    Volume/Electrolyte Status: Preop wt Weight: 105 kg (230 lb 8.9 oz)   JO: baseline SCr 1-1.4  Creatinine   Date Value Ref Range Status   2025 1.39 (H) 0.50 - 1.30 mg/dL Final   2025 1.45 (H) 0.50 - 1.30 mg/dL Final   2025 1.21 0.50 - 1.30 mg/dL Final   2025 1.55 (H) 0.50 - 1.30 mg/dL Final     Vitals:    25 0820   Weight: 105 kg (230 lb 7.2 oz)   - Weights: 105 kg, 109, 106, 105, 107  -Replete electrolytes for hypokalemia/hypomagnesemia/hypophosphatemia as needed   -Daily weights and strict I&Os  -daily RFP  -diuretics as indicated  -renally dose all medications  -avoid nephrotoxic drugs  -accurate I/Os  - 6/3: Lasix 40mg BID today; : continue Lasix 40 mg BID     Leukocytosis- likely inflammatory  Recent Labs     25  0734 25  1128 25  0700 25  1142 25  0146 25  1251 25  0026   WBC 11.2 11.8* 16.8* 23.2* 22.8* 20.4* 11.6*     Temp (36hrs), Av.6 °C (97.8 °F), Min:35.9 °C (96.6 °F), Max:37.2 °C (99 °F)  - daily CBC to follow      NIDDM: home meds: Metformin 500 mg BID, Ozempic 0.25 mg injection weekly   Lab Results   Component Value Date    HGBA1C 7.0 (H) 2025     Results from last 7 days   Lab Units 25  0823 25  2030 25  1632 25  1150 25  0740 25  2303 25   POCT GLUCOSE mg/dL 203* 133* 152* 160* 142* 142* 271*   -accuchecks premeal and at bedtime  -diabetic diet  - lantus and lispro premeal corrective scale for now  - Will transition to meformin and ozempic when creatine appropriate    Hypertension: home meds: Losartan 100 mg daily, Toprol XL 25 mg daily, Diltiazem ER  300 mg daily    Systolic (24hrs), Av , Min:110 , Max:127   - Will transition hydralazine to Losartan 25mg today  - additional antihypertensives as needed      VTE Prophylaxis: SCDs/TEDs, ambulation, SQ heparin  Code Status: Full Code    Dispo  - PT/OT recs --> low intensity   - Would benefit from homecare RN for cardiac surgery carepath  - Anticipate discharge home tomorrow   - Will continue to assess discharge needs      TONY Amezcua-CNP  Cardiac Surgery [facility]  [facility]  Team Phone [phone]    2025  10:17 AM                        [1] acetaminophen, 650 mg, oral, q6h  apixaban, 5 mg, oral, q12h  aspirin, 81 mg, oral, Daily  atorvastatin, 80 mg, oral, Nightly  furosemide, 40 mg, intravenous, Daily  heparin (porcine), 5,000 Units, subcutaneous, q8h  insulin glargine, 20 Units, subcutaneous, Daily  insulin lispro, 0-10 Units, subcutaneous, TID  losartan, 25 mg, oral, Daily  metoprolol tartrate, 25 mg, oral, BID  multivitamin with minerals, 1 tablet, oral, Daily  pantoprazole, 40 mg, oral, Daily before breakfast  polyethylene glycol, 17 g, oral, BID  sennosides-docusate sodium, 2 tablet, oral, BID     [2]    [3] PRN medications: bisacodyl, dextrose **OR** glucagon, naloxone, oxyCODONE, oxygen, oxygen, simethicone

## 2025-06-05 ENCOUNTER — DOCUMENTATION (OUTPATIENT)
Dept: HOME HEALTH SERVICES | Facility: HOME HEALTH | Age: 75
End: 2025-06-05
Payer: COMMERCIAL

## 2025-06-05 ENCOUNTER — HOME HEALTH ADMISSION (OUTPATIENT)
Dept: HOME HEALTH SERVICES | Facility: HOME HEALTH | Age: 75
End: 2025-06-05
Payer: COMMERCIAL

## 2025-06-05 VITALS
HEART RATE: 103 BPM | HEIGHT: 69 IN | WEIGHT: 229.7 LBS | DIASTOLIC BLOOD PRESSURE: 65 MMHG | TEMPERATURE: 96.8 F | RESPIRATION RATE: 18 BRPM | BODY MASS INDEX: 34.02 KG/M2 | OXYGEN SATURATION: 94 % | SYSTOLIC BLOOD PRESSURE: 146 MMHG

## 2025-06-05 LAB
ALBUMIN SERPL BCP-MCNC: 3 G/DL (ref 3.4–5)
ANION GAP SERPL CALC-SCNC: 12 MMOL/L (ref 10–20)
BUN SERPL-MCNC: 35 MG/DL (ref 6–23)
CALCIUM SERPL-MCNC: 8.2 MG/DL (ref 8.6–10.6)
CHLORIDE SERPL-SCNC: 102 MMOL/L (ref 98–107)
CO2 SERPL-SCNC: 27 MMOL/L (ref 21–32)
CREAT SERPL-MCNC: 1.34 MG/DL (ref 0.5–1.3)
EGFRCR SERPLBLD CKD-EPI 2021: 55 ML/MIN/1.73M*2
ERYTHROCYTE [DISTWIDTH] IN BLOOD BY AUTOMATED COUNT: 13.6 % (ref 11.5–14.5)
GLUCOSE BLD MANUAL STRIP-MCNC: 149 MG/DL (ref 74–99)
GLUCOSE SERPL-MCNC: 114 MG/DL (ref 74–99)
HCT VFR BLD AUTO: 27.9 % (ref 41–52)
HGB BLD-MCNC: 9.2 G/DL (ref 13.5–17.5)
MCH RBC QN AUTO: 30.4 PG (ref 26–34)
MCHC RBC AUTO-ENTMCNC: 33 G/DL (ref 32–36)
MCV RBC AUTO: 92 FL (ref 80–100)
NRBC BLD-RTO: 0 /100 WBCS (ref 0–0)
PHOSPHATE SERPL-MCNC: 3.6 MG/DL (ref 2.5–4.9)
PLATELET # BLD AUTO: 176 X10*3/UL (ref 150–450)
POTASSIUM SERPL-SCNC: 4.4 MMOL/L (ref 3.5–5.3)
RBC # BLD AUTO: 3.03 X10*6/UL (ref 4.5–5.9)
SODIUM SERPL-SCNC: 137 MMOL/L (ref 136–145)
WBC # BLD AUTO: 9.8 X10*3/UL (ref 4.4–11.3)

## 2025-06-05 PROCEDURE — 85027 COMPLETE CBC AUTOMATED: CPT | Performed by: PHYSICIAN ASSISTANT

## 2025-06-05 PROCEDURE — 2500000001 HC RX 250 WO HCPCS SELF ADMINISTERED DRUGS (ALT 637 FOR MEDICARE OP): Performed by: NURSE PRACTITIONER

## 2025-06-05 PROCEDURE — 2500000002 HC RX 250 W HCPCS SELF ADMINISTERED DRUGS (ALT 637 FOR MEDICARE OP, ALT 636 FOR OP/ED): Performed by: NURSE PRACTITIONER

## 2025-06-05 PROCEDURE — 80069 RENAL FUNCTION PANEL: CPT | Performed by: PHYSICIAN ASSISTANT

## 2025-06-05 PROCEDURE — 2500000001 HC RX 250 WO HCPCS SELF ADMINISTERED DRUGS (ALT 637 FOR MEDICARE OP)

## 2025-06-05 PROCEDURE — 36415 COLL VENOUS BLD VENIPUNCTURE: CPT | Performed by: PHYSICIAN ASSISTANT

## 2025-06-05 PROCEDURE — 2500000001 HC RX 250 WO HCPCS SELF ADMINISTERED DRUGS (ALT 637 FOR MEDICARE OP): Performed by: PHYSICIAN ASSISTANT

## 2025-06-05 PROCEDURE — 99239 HOSP IP/OBS DSCHRG MGMT >30: CPT

## 2025-06-05 PROCEDURE — 82947 ASSAY GLUCOSE BLOOD QUANT: CPT

## 2025-06-05 RX ORDER — LOSARTAN POTASSIUM 25 MG/1
25 TABLET ORAL DAILY
Qty: 30 TABLET | Refills: 0 | Status: SHIPPED | OUTPATIENT
Start: 2025-06-05 | End: 2025-07-05

## 2025-06-05 RX ORDER — METOPROLOL TARTRATE 50 MG/1
50 TABLET ORAL 2 TIMES DAILY
Qty: 60 TABLET | Refills: 0 | Status: SHIPPED | OUTPATIENT
Start: 2025-06-05 | End: 2025-07-05

## 2025-06-05 RX ORDER — ACETAMINOPHEN 325 MG/1
650 TABLET ORAL EVERY 6 HOURS PRN
COMMUNITY
Start: 2025-06-05

## 2025-06-05 RX ORDER — MULTIVIT-MIN/IRON FUM/FOLIC AC 7.5 MG-4
1 TABLET ORAL DAILY
COMMUNITY
Start: 2025-06-05

## 2025-06-05 RX ORDER — OXYCODONE HYDROCHLORIDE 5 MG/1
5 TABLET ORAL EVERY 6 HOURS PRN
Qty: 12 TABLET | Refills: 0 | Status: SHIPPED | OUTPATIENT
Start: 2025-06-05 | End: 2025-06-08

## 2025-06-05 RX ORDER — FUROSEMIDE 40 MG/1
40 TABLET ORAL DAILY
Qty: 3 TABLET | Refills: 0 | Status: SHIPPED | OUTPATIENT
Start: 2025-06-06 | End: 2025-06-09

## 2025-06-05 RX ORDER — DOCUSATE SODIUM 100 MG/1
100 CAPSULE, LIQUID FILLED ORAL 2 TIMES DAILY PRN
COMMUNITY
Start: 2025-06-05

## 2025-06-05 RX ORDER — POLYETHYLENE GLYCOL 3350 17 G/17G
17 POWDER, FOR SOLUTION ORAL DAILY PRN
COMMUNITY
Start: 2025-06-05

## 2025-06-05 RX ORDER — METOPROLOL TARTRATE 50 MG/1
50 TABLET ORAL 2 TIMES DAILY
Status: DISCONTINUED | OUTPATIENT
Start: 2025-06-05 | End: 2025-06-05 | Stop reason: HOSPADM

## 2025-06-05 RX ADMIN — APIXABAN 5 MG: 5 TABLET, FILM COATED ORAL at 08:29

## 2025-06-05 RX ADMIN — Medication 1 TABLET: at 08:29

## 2025-06-05 RX ADMIN — ASPIRIN 81 MG: 81 TABLET, CHEWABLE ORAL at 08:29

## 2025-06-05 RX ADMIN — METOPROLOL TARTRATE 50 MG: 50 TABLET, FILM COATED ORAL at 08:29

## 2025-06-05 RX ADMIN — LOSARTAN POTASSIUM 25 MG: 25 TABLET, FILM COATED ORAL at 08:29

## 2025-06-05 RX ADMIN — FUROSEMIDE 40 MG: 40 TABLET ORAL at 08:29

## 2025-06-05 RX ADMIN — PANTOPRAZOLE SODIUM 40 MG: 40 TABLET, DELAYED RELEASE ORAL at 08:29

## 2025-06-05 RX ADMIN — ACETAMINOPHEN 650 MG: 325 TABLET ORAL at 08:29

## 2025-06-05 RX ADMIN — INSULIN GLARGINE 20 UNITS: 100 INJECTION, SOLUTION SUBCUTANEOUS at 08:29

## 2025-06-05 NOTE — PROGRESS NOTES
06/05/25 1052   Discharge Planning   Expected Discharge Disposition Home H  ([facility])     [facility] was notidied of patient's discharge home. They confirmed start of care on Saturday.

## 2025-06-05 NOTE — PROGRESS NOTES
"CARDIAC SURGERY DAILY PROGRESS NOTE    Franky Sauceda is a 74 y.o. male, with a PMH of  NIDDM, HTN, atypical atrial flutter, paroxysmal atrial fibrillation, CAD, and TIA, who presented to [facility] on 5/30/2025 for CABG x3 and AVR with Dr. Molina.       OPERATION/PROCEDURE: CABG x3 and AVR with Zaida Molina MD on 5/30/25  Median Sternotomy  CABG x 3: LIMA-LAD/ SVG-OM/SVG-PLV  AVR with 23mm Inspirus Resilia Bioprosthetic Aortic Valve  MAZE with Synergy Encompass  LAAC with AtriCure AtriClip 45mm  Posterior Pericardiotomy  Right Leg EVH  Sternal Closure with Wires and SternaLock XP Rigid Fixation System    CTICU Course: Uneventful   Transferred to T3 on 6/1/25    ======================  Interval History:   No acute events overnight     SUBJECTIVE:  Sitting up in chair. On RA. No complaints this AM     Objective   /65 (BP Location: Right arm, Patient Position: Sitting)   Pulse 103   Temp 36 °C (96.8 °F) (Temporal)   Resp 18   Ht 1.753 m (5' 9\")   Wt 104 kg (229 lb 11.2 oz)   SpO2 94%   BMI 33.92 kg/m²   0-10 (Numeric) Pain Score: 1   3 Day Weight Change: -0.007 kg (-0.2 oz) per day    Intake and Output    Intake/Output Summary (Last 24 hours) at 6/5/2025 0901  Last data filed at 6/5/2025 0738  Gross per 24 hour   Intake 1060 ml   Output 2200 ml   Net -1140 ml       Physical Exam  Physical Exam  Vitals and nursing note reviewed.   Constitutional:       General: He is not in acute distress.  HENT:      Head: Normocephalic and atraumatic.      Mouth/Throat:      Mouth: Mucous membranes are moist.   Eyes:      Conjunctiva/sclera: Conjunctivae normal.   Cardiovascular:      Rate and Rhythm: Normal rate and regular rhythm.      Pulses: Normal pulses.      Heart sounds: Normal heart sounds.      Comments: Tele: SR HR 80s-90s  6/4- Afib HR low 90s-100s  Wires capped   Pulmonary:      Effort: Pulmonary effort is normal.      Comments: RA  Sternum stable  Diminished bases   Abdominal:      " General: Abdomen is flat.      Palpations: Abdomen is soft.      Comments: BM 6/3   Genitourinary:     Comments: Voids independently   Musculoskeletal:         General: Normal range of motion.      Cervical back: Neck supple.      Right lower leg: Edema (trace) present.      Left lower leg: Edema (trace) present.      Comments: JORGENSEN   Skin:     General: Skin is warm.      Capillary Refill: Capillary refill takes less than 2 seconds.      Comments: midsternal chest incision C/D/I, well approximated, no signs infection  right SVG incision C/D/I, well approximated, no s/s infection       Neurological:      General: No focal deficit present.      Mental Status: He is alert and oriented to person, place, and time.   Psychiatric:         Attention and Perception: Attention normal.         Mood and Affect: Mood normal.         Speech: Speech normal.         Behavior: Behavior is cooperative.         Cognition and Memory: Cognition normal.           Medications  Scheduled medications  Scheduled Medications[1]Continuous medications  Continuous Medications[2]PRN medications  PRN Medications[3]    Labs  Results for orders placed or performed during the hospital encounter of 05/30/25 (from the past 24 hours)   POCT GLUCOSE   Result Value Ref Range    POCT Glucose 161 (H) 74 - 99 mg/dL   POCT GLUCOSE   Result Value Ref Range    POCT Glucose 167 (H) 74 - 99 mg/dL   CBC   Result Value Ref Range    WBC 9.8 4.4 - 11.3 x10*3/uL    nRBC 0.0 0.0 - 0.0 /100 WBCs    RBC 3.03 (L) 4.50 - 5.90 x10*6/uL    Hemoglobin 9.2 (L) 13.5 - 17.5 g/dL    Hematocrit 27.9 (L) 41.0 - 52.0 %    MCV 92 80 - 100 fL    MCH 30.4 26.0 - 34.0 pg    MCHC 33.0 32.0 - 36.0 g/dL    RDW 13.6 11.5 - 14.5 %    Platelets 176 150 - 450 x10*3/uL   Renal Function Panel   Result Value Ref Range    Glucose 114 (H) 74 - 99 mg/dL    Sodium 137 136 - 145 mmol/L    Potassium 4.4 3.5 - 5.3 mmol/L    Chloride 102 98 - 107 mmol/L    Bicarbonate 27 21 - 32 mmol/L    Anion Gap 12 10  - 20 mmol/L    Urea Nitrogen 35 (H) 6 - 23 mg/dL    Creatinine 1.34 (H) 0.50 - 1.30 mg/dL    eGFR 55 (L) >60 mL/min/1.73m*2    Calcium 8.2 (L) 8.6 - 10.6 mg/dL    Phosphorus 3.6 2.5 - 4.9 mg/dL    Albumin 3.0 (L) 3.4 - 5.0 g/dL   POCT GLUCOSE   Result Value Ref Range    POCT Glucose 149 (H) 74 - 99 mg/dL               IMAGING/ DIAGNOSTIC TESTING:  I have personally reviewed the following test result(s):    Transthoracic Echo (TTE) Complete With Contrast 06/03/2025  CONCLUSIONS:  1. Left ventricular ejection fraction is hyperdynamic by visual estimate at 70-75%.  2. Poorly visualized anatomical structures due to suboptimal image quality.  3. Spectral Doppler shows an abnormal pattern of left ventricular diastolic filling.  4. Abnormal septal motion consistent with post-operative status.  5. No left ventricular thrombus visualized.  6. There is low normal right ventricular systolic function.  7. Small pericardial effusion.  8. The doppler estimated RVSP is mildly elevated with a right ventricular systolic pressure of 40 mmHg.  9. S/p 23mm Inspiris Resilia bioprosthetic AVR with gradients of 43/22mmHg and DI of 0.44 and no AI.  10. There is an Rojas bioprosthetic type aortic valve bioprosthesis with a 23 mm reported size. The peak and mean gradients are 43 mmHg and 22 mmHg respectively.  11. Compared with study dated 5/30/2025, the prior study was an intra-operative CHAD, the gradients post AVR across the valve were not well assessed. The mean gradient at that time was estimated at 18mmHg and has now slightly increased to 22mmHg, but the LV is quite dynamic today. This is the first postoperative TTE following AVR for severe AS.      XR chest 2 views 06/03/2025  Impression  1. No evidence of sizeable pneumothorax.  2. Slightly improved bibasilar atelectasis with trace bilateral  pleural effusions.  3. Medical devices as detailed above.      XR chest 1 view 06/02/2025  Impression  1. No sizable pneumothorax.  2. Similar  bibasilar atelectasis with bilateral pleural effusions  left more than right.  3. Medical devices as detailed.      XR chest 1 view 05/31/2025  Impression  1. Low lung volume with small left pleural effusion/left basilar  atelectasis.  2. Medical devices as detailed above.      ===============  IMPRESSION & PLAN:  ==============  POD # 6 s/p CABG x3, AVR, MAZE and LAAC with Dr. Molina   - Increase activity/ ambulation; PT/OT  - Encourage IS, C/DB; respiratory therapy; wean O2 as lino   - Cardiac rehab referral   - Continue cardiac meds: ASA,, statin   - Pain and anticonstipation meds  - 2v CXR done  - Postop echo done   - Cut epicardial wires prior to discharge   - Tele until discharge  - Optimize nutrition and electrolytes    Acute post op pain  - Continue scheduled tylenol and PRN oxycodone    Hx of pAF (pt on Eliquis at home)   - Tele: 6/5: SR Hr 80s-90s  - 6/4 AF HF low 100s (was SR overnight and yesterday)  - Continue metoprolol  - 6/4: per Dr. Molina, ok to start home eliquis today  - 6/5: increased metop to 50 mg BID     Acute Blood Loss Anemia- stable  Recent Labs     06/05/25  0645 06/04/25  0734 06/03/25  1128 06/02/25  0700 06/01/25  1142 06/01/25  0146 05/31/25  1251   HGB 9.2* 9.3* 9.7* 10.2* 10.5* 10.3* 9.9*   HCT 27.9* 27.9* 29.2* 31.8* 32.3* 31.6* 30.5*   - MV, PO Iron x1mo  - Daily labs, transfuse as indicated    Thrombocytopenia- stable  Recent Labs     06/05/25  0645 06/04/25  0734 06/03/25  1128 06/02/25  0700 06/01/25  1142 06/01/25  0146 05/31/25  1251    140* 134* 94* 100* 91* 109*   - Etiology likely postop/CPB related  - Continue to trend with daily CBCs    Volume/Electrolyte Status: Preop wt Weight: 105 kg (230 lb 8.9 oz)   JO: baseline SCr 1-1.4  Creatinine   Date Value Ref Range Status   06/05/2025 1.34 (H) 0.50 - 1.30 mg/dL Final   06/04/2025 1.39 (H) 0.50 - 1.30 mg/dL Final   06/03/2025 1.45 (H) 0.50 - 1.30 mg/dL Final     Vitals:    06/05/25 0100   Weight: 104 kg (229 lb 11.2 oz)    - Weights: 104 kg, 105 kg, 109, 106, 105, 107  -Replete electrolytes for hypokalemia/hypomagnesemia/hypophosphatemia as needed   -Daily weights and strict I&Os  -daily RFP  -diuretics as indicated  -renally dose all medications  -avoid nephrotoxic drugs  -accurate I/Os  - 6/3: Lasix 40mg BID today; : continue Lasix 40 mg BID   - : started Lasix 40 mg PO     Leukocytosis- likely inflammatory  Recent Labs     25  0645 25  0734 25  1128 25  0700 25  1142 25  0146 25  1251   WBC 9.8 11.2 11.8* 16.8* 23.2* 22.8* 20.4*     Temp (36hrs), Av.6 °C (97.8 °F), Min:36 °C (96.8 °F), Max:36.9 °C (98.4 °F)  - daily CBC to follow      NIDDM: home meds: Metformin 500 mg BID, Ozempic 0.25 mg injection weekly   Lab Results   Component Value Date    HGBA1C 7.0 (H) 2025     Results from last 7 days   Lab Units 25  0737 25  1621 25  1136 25  0823 25  2030 25  1632 25  1150   POCT GLUCOSE mg/dL 149* 167* 161* 203* 133* 152* 160*   -accuchecks premeal and at bedtime  -diabetic diet  - lantus and lispro premeal corrective scale for now  - Will transition to meformin and ozempic when creatine appropriate    Hypertension: home meds: Losartan 100 mg daily, Toprol XL 25 mg daily, Diltiazem  mg daily    Systolic (24hrs), Av , Min:107 , Max:146   - Will transition hydralazine to Losartan 25mg today  - additional antihypertensives as needed      VTE Prophylaxis: SCDs/TEDs, ambulation, SQ heparin  Code Status: Full Code    Dispo  - PT/OT recs --> low intensity   - Would benefit from homecare RN for cardiac surgery carepath  - Anticipate discharge home today  - Will continue to assess discharge needs      TONY Amezcua-CNP  Cardiac Surgery [facility]  [facility]  Team Phone [phone]    2025  9:01 AM                        [1] acetaminophen, 650 mg, oral, q6h  apixaban, 5 mg, oral, q12h  aspirin, 81 mg, oral,  Daily  atorvastatin, 80 mg, oral, Nightly  furosemide, 40 mg, oral, Daily  insulin glargine, 20 Units, subcutaneous, Daily  insulin lispro, 0-10 Units, subcutaneous, TID  losartan, 25 mg, oral, Daily  metoprolol tartrate, 50 mg, oral, BID  multivitamin with minerals, 1 tablet, oral, Daily  pantoprazole, 40 mg, oral, Daily before breakfast  polyethylene glycol, 17 g, oral, BID  sennosides-docusate sodium, 2 tablet, oral, BID     [2]    [3] PRN medications: bisacodyl, dextrose **OR** glucagon, naloxone, oxyCODONE, oxygen, oxygen, simethicone

## 2025-06-05 NOTE — HH CARE COORDINATION
Home Care received a Referral for Nursing, Physical Therapy, and Occupational Therapy. We have processed the referral for a Start of Care on 6.7.2025.     If you have any questions or concerns, please feel free to contact us at [phone]. Follow the prompts, enter your five digit zip code, and you will be directed to your care team on [facility].

## 2025-06-05 NOTE — SIGNIFICANT EVENT
Ventricular wires cut at skin level due to surgeon preference.  Patient instructed to notify radiology of retained epicardial wires prior to any MRI procedure, and to notify Dr Molina of any visible wires or s/s infection.    TONY Amezcua-CNP  Cardiac Surgery [facility]  [facility]  Team Phone [phone]

## 2025-06-05 NOTE — DISCHARGE SUMMARY
Discharge Diagnosis  Severe aortic stenosis    S/p CABG x3 and AVR     Issues Requiring Follow-Up  Follow up with PCP, cardiology, and cardiac surgery after discharge      Test Results Pending At Discharge  Pending Labs       Order Current Status    Research collection:  1.2mL Lavendar EDTA - Clinic Colect In process    Surgical Pathology Exam In process            Hospital Course  Franky Sauceda is a 74 y.o. male, with a PMH of  NIDDM, HTN, atypical atrial flutter, paroxysmal atrial fibrillation, CAD, and TIA, who presented to [facility] (formerly [facility]) on 5/30/2025 for CABG x3 and AVR with Dr. Molina.         OPERATION/PROCEDURE: CABG x3 and AVR with Zaida Molina MD on 5/30/25  Median Sternotomy  CABG x 3: LIMA-LAD/ SVG-OM/SVG-PLV  AVR with 23mm Inspirus Resilia Bioprosthetic Aortic Valve  MAZE with Synergy Encompass  LAAC with AtriCure AtriClip 45mm  Posterior Pericardiotomy  Right Leg EVH  Sternal Closure with Wires and SternaLock XP Rigid Fixation System     CTICU Course: Uneventful   Transferred to  on 6/1/25      Floor Course:  - Patient was diuresed for fluid volume overload post cardiac surgery; Preop weight: 105 kg, discharge wt: 104 kg  - On ASA, statin, BB, by discharge  - Pt resumed on home Eliquis 6/4 per Dr. Molina   - Epicardial wires CUT on 6/5/25  - Telemetry at discharge: Sinus rhythm HR 80s-90s; Had rate-controlled Afib on 6/4 (pt has hx of pAF)   - 2v CXR done 6/3/25  - Postop echo done 6/3/25  - Cardiac rehab referral was placed  - PT recs low intensity therapy  - Anticipate discharge to home with homecare    Discharged on 6/5/25        On day of discharge, vital signs were stable and no acute distress was noted. All questions were answered. After VS and labs were reviewed it was determined the patient was stable for discharge.   Hospital day of discharge management- spent >30 minutes coordinating the discharge and counseling/educating patient and family regarding discharge  instructions.  =============================================    Past Medical History       Diagnosis Date    Arrhythmia      Bence Huang proteinuria 07/27/2013     Bence Huang proteinuria    Coronary artery disease involving native coronary artery of native heart without angina pectoris 04/02/2024     CAC 2101 (2017)    Dorsalgia, unspecified 09/27/2016     Back pain, acute    Encounter for immunization 06/29/2018     Need for shingles vaccine    Fatigue 04/10/2023    GERD (gastroesophageal reflux disease)      History of recent steroid use      Hyperlipidemia      Hypertension      Localized edema 07/07/2022     Edema of extremities    Moderate aortic stenosis 04/02/2024    PAF (paroxysmal atrial fibrillation) (Multi) 04/02/2024    Pain in left knee 12/08/2021     Acute pain of left knee    Pain in unspecified knee 04/28/2020     Joint pain, knee    Personal history of other diseases of the circulatory system       Personal history of coronary atherosclerosis    Personal history of other diseases of urinary system 07/07/2022     History of renal insufficiency syndrome    Personal history of other infectious and parasitic diseases 05/14/2018     History of herpes labialis    Severe aortic stenosis 04/02/2024    Sleep apnea       no CPAP being used    Sprain of other specified parts of left knee, initial encounter 01/02/2020     Injury of meniscus of left knee, initial encounter    Type 2 diabetes mellitus      Unilateral primary osteoarthritis, left knee 08/20/2019     Arthritis of knee, left       Past Surgical History        Procedure Laterality Date    CARDIAC CATHETERIZATION N/A 5/9/2025     Procedure: LHC, With LV;  Surgeon: Loy Soni MD;  Location: [facility] Cardiac Cath Lab;  Service: Cardiovascular;  Laterality: N/A;    CHOLECYSTECTOMY   01/28/2014     Cholecystectomy    COLONOSCOPY   01/28/2014     Complete Colonoscopy    ESOPHAGOGASTRODUODENOSCOPY   01/28/2014     Diagnostic Esophagogastroduodenoscopy                Medications Prior to Admission   Medication Sig Dispense Refill Last Dose/Taking    aspirin 81 mg EC tablet Take 1 tablet (81 mg) by mouth once daily. Do not fill before May 10, 2025. (Patient taking differently: Take 1 tablet (81 mg) by mouth once daily. Taking #1 tablet twice daily (temporary dose in prep of procedure)) 90 tablet 3 2025 at  6:00 PM    atorvastatin (Lipitor) 80 mg tablet Take 1 tablet (80 mg) by mouth once daily. 90 tablet 3 2025 at  6:00 PM    cholecalciferol (Vitamin D-3) 50 mcg (2,000 unit) capsule Take 1 capsule (50 mcg) by mouth once daily.     Past Week    metFORMIN (Glucophage) 500 mg tablet Take 2 tablets (1,000 mg) by mouth 2 times a day. 360 tablet 3 2025 at  6:00 PM    pantoprazole (ProtoNix) 40 mg EC tablet Take 1 tablet (40 mg) by mouth once daily. 90 tablet 3 2025 at  6:00 PM    Blood glucose monitoring meter kit kit 1 each if needed.          blood sugar diagnostic (True Metrix Glucose Test Strip) strip 100 strips once daily. 100 strip 3      [] chlorhexidine (Hibiclens) 4 % external liquid Apply topically once daily as needed for wound care for up to 5 days. 473 mL 0      [] chlorhexidine (Peridex) 0.12 % solution Use 15 mL in the mouth or throat if needed (Please rinse mouth night before surgey and morning of surgery) for up to 2 days. 473 mL 0      dilTIAZem ER (Tiazac) 300 mg 24 hr capsule Take 1 capsule (300 mg) by mouth once daily. 90 capsule 3      Eliquis 5 mg tablet Take 1 tablet (5 mg) by mouth 2 times a day. 180 tablet 3 2025    FreeStyle Jaun 3 [place] misc Use as instructed 1 each 0      FreeStyle Jaun 3 Sensor device Change sensor every 14 days as directed. Rotate sites. 2 each 3      lancets misc 100 Lancets once daily. 100 each 3      losartan (Cozaar) 100 mg tablet Take 1 tablet (100 mg) by mouth once daily. 90 tablet 3      metoprolol succinate XL (Toprol-XL) 25 mg 24 hr tablet Take 1 tablet (25 mg) by mouth once daily.  90 tablet 3      nitroglycerin (Nitrostat) 0.4 mg SL tablet Place 1 tablet (0.4 mg) under the tongue every 5 minutes if needed for chest pain. 30 tablet 0      semaglutide (Ozempic) 0.25 mg or 0.5 mg (2 mg/3 mL) pen injector INJECT 0.5MG UNDER THE SKIN ONCE A WEEK AS DIRECTED 3 mL 2 5/17/2025     Allergies: Cerivastatin, Jardiance [empagliflozin], Mounjaro [tirzepatide], Nifedipine, Statins-hmg-coa reductase inhibitors, and Icosapent ethyl      Social History        Tobacco Use    Smoking status: Never       Passive exposure: Never    Smokeless tobacco: Never   Vaping Use    Vaping status: Never Used   Substance Use Topics    Alcohol use: Not Currently    Drug use: Never       Family History         Problem Relation Name Age of Onset    Hypertension Mother        Other (abdominal aortic aneurysm) Father        Hyperlipidemia Father        Hypertension Father               Pertinent Physical Exam At Time of Discharge  Physical Exam  Please see progress note of 6/5/2025 for physical exam       Home Medications     Medication List      START taking these medications     acetaminophen 325 mg tablet; Commonly known as: Tylenol; Take 2 tablets   (650 mg) by mouth every 6 hours if needed for mild pain (1 - 3).   docusate sodium 100 mg capsule; Commonly known as: Colace; Take 1   capsule (100 mg) by mouth 2 times a day as needed for constipation.   furosemide 40 mg tablet; Commonly known as: Lasix; Take 1 tablet (40 mg)   by mouth once daily for 3 days.; Start taking on: June 6, 2025   metoprolol tartrate 50 mg tablet; Commonly known as: Lopressor; Take 1   tablet by mouth 2 times a day.   multivitamin with minerals tablet; Take 1 tablet by mouth once daily.   oxyCODONE 5 mg immediate release tablet; Commonly known as: Roxicodone;   Take 1 tablet (5 mg) by mouth every 6 hours if needed for severe pain (7 -   10) for up to 3 days.   polyethylene glycol 17 gram packet; Commonly known as: Glycolax,   Miralax; Take 17 g by  mouth once daily as needed (constipation).     CHANGE how you take these medications     aspirin 81 mg EC tablet; Take 1 tablet (81 mg) by mouth once daily. Do   not fill before May 10, 2025.; What changed: additional instructions   losartan 25 mg tablet; Commonly known as: Cozaar; Take 1 tablet (25 mg)   by mouth once daily.; What changed: medication strength, how much to take     CONTINUE taking these medications     atorvastatin 80 mg tablet; Commonly known as: Lipitor; Take 1 tablet (80   mg) by mouth once daily.   Blood glucose monitoring meter   cholecalciferol 50 mcg (2,000 units) capsule; Commonly known as: Vitamin   D-3   Eliquis 5 mg tablet; Generic drug: apixaban; Take 1 tablet (5 mg) by   mouth 2 times a day.   FreeStyle Jaun 3 [place] misc; Generic drug:   blood-glucose,,cont; Use as instructed   FreeStyle Jaun 3 Sensor device; Generic drug: blood-glucose sensor;   Change sensor every 14 days as directed. Rotate sites.   lancets misc; 100 Lancets once daily.   metFORMIN 500 mg tablet; Commonly known as: Glucophage; Take 2 tablets   (1,000 mg) by mouth 2 times a day.   Ozempic 0.25 mg or 0.5 mg (2 mg/3 mL) pen injector; Generic drug:   semaglutide; INJECT 0.5MG UNDER THE SKIN ONCE A WEEK AS DIRECTED   pantoprazole 40 mg EC tablet; Commonly known as: ProtoNix; Take 1 tablet   (40 mg) by mouth once daily.   True Metrix Glucose Test Strip; Generic drug: blood sugar diagnostic;   100 strips once daily.     STOP taking these medications     chlorhexidine 0.12 % solution; Commonly known as: Peridex   chlorhexidine 4 % external liquid; Commonly known as: Hibiclens   dilTIAZem  mg 24 hr capsule; Commonly known as: Tiazac   metoprolol succinate XL 25 mg 24 hr tablet; Commonly known as: Toprol-XL   nitroglycerin 0.4 mg SL tablet; Commonly known as: Nitrostat       Outpatient Follow-Up  Future Appointments   Date Time Provider Department Center   9/2/2025 10:45 AM Angus Haines MD [id number]  [place]   10/16/2025 11:00 AM CARLOS Cerna [id number] [facility]       CARLOS Amezcua

## 2025-06-07 ENCOUNTER — HOME CARE VISIT (OUTPATIENT)
Dept: HOME HEALTH SERVICES | Facility: HOME HEALTH | Age: 75
End: 2025-06-07
Payer: COMMERCIAL

## 2025-06-07 PROCEDURE — 169592 NO-PAY CLAIM PROCEDURE

## 2025-06-07 PROCEDURE — G0299 HHS/HOSPICE OF RN EA 15 MIN: HCPCS

## 2025-06-08 VITALS
TEMPERATURE: 99.2 F | HEIGHT: 69 IN | DIASTOLIC BLOOD PRESSURE: 58 MMHG | BODY MASS INDEX: 33.92 KG/M2 | HEART RATE: 73 BPM | SYSTOLIC BLOOD PRESSURE: 112 MMHG | OXYGEN SATURATION: 95 % | WEIGHT: 229 LBS

## 2025-06-08 ASSESSMENT — ENCOUNTER SYMPTOMS
PAIN LOCATION: BACK
LOWER EXTREMITY EDEMA: 1
PAIN LOCATION - PAIN SEVERITY: 6/10
LIMITED RANGE OF MOTION: 1
LAST BOWEL MOVEMENT: 67362
SHORTNESS OF BREATH: 1
PAIN LOCATION - PAIN QUALITY: SHARP
DIZZINESS: 1
HIGHEST PAIN SEVERITY IN PAST 24 HOURS: 7/10
PAIN: 1
PAIN SEVERITY GOAL: 0/10
RHINORRHEA: 1
APPETITE LEVEL: GOOD
LOWEST PAIN SEVERITY IN PAST 24 HOURS: 0/10
CHANGE IN APPETITE: UNCHANGED
DYSPNEA ACTIVITY LEVEL: AFTER AMBULATING 10 - 20 FT
PERSON REPORTING PAIN: PATIENT
MUSCLE WEAKNESS: 1
SUBJECTIVE PAIN PROGRESSION: WAXING AND WANING
PAIN LOCATION - PAIN FREQUENCY: INTERMITTENT
ARTHRALGIAS: 1

## 2025-06-08 ASSESSMENT — ACTIVITIES OF DAILY LIVING (ADL)
OASIS_M1830: 03
AMBULATION ASSISTANCE: 1
CURRENT_FUNCTION: SUPERVISION
AMBULATION ASSISTANCE: SUPERVISION
AMBULATION ASSISTANCE: STAND BY ASSIST
ENTERING_EXITING_HOME: NEEDS ASSISTANCE
CURRENT_FUNCTION: STAND BY ASSIST
PHYSICAL TRANSFERS ASSESSED: 1

## 2025-06-09 ENCOUNTER — HOME CARE VISIT (OUTPATIENT)
Dept: HOME HEALTH SERVICES | Facility: HOME HEALTH | Age: 75
End: 2025-06-09
Payer: COMMERCIAL

## 2025-06-09 ENCOUNTER — PATIENT OUTREACH (OUTPATIENT)
Dept: PRIMARY CARE | Facility: CLINIC | Age: 75
End: 2025-06-09
Payer: COMMERCIAL

## 2025-06-09 PROCEDURE — G0152 HHCP-SERV OF OT,EA 15 MIN: HCPCS

## 2025-06-09 PROCEDURE — G0151 HHCP-SERV OF PT,EA 15 MIN: HCPCS

## 2025-06-09 SDOH — ECONOMIC STABILITY: HOUSING INSECURITY: HOME SAFETY: CARDIAC PRECAUTIONS

## 2025-06-09 SDOH — HEALTH STABILITY: PHYSICAL HEALTH: EXERCISE TYPE: PHASE 1 CARDIAC REHAB

## 2025-06-09 ASSESSMENT — ENCOUNTER SYMPTOMS
AGITATION: 1
SUBJECTIVE PAIN PROGRESSION: WAXING AND WANING
PERSON REPORTING PAIN: PATIENT
PAIN LOCATION - RELIEVING FACTORS: REST, MEDICATION
MUSCLE WEAKNESS: 1
PERSON REPORTING PAIN: PATIENT
DENIES PAIN: 1
PAIN LOCATION - EXACERBATING FACTORS: MOBILITY
HIGHEST PAIN SEVERITY IN PAST 24 HOURS: 5/10
PAIN LOCATION - PAIN SEVERITY: 5/10
PAIN LOCATION: BACK
PAIN LOCATION - PAIN FREQUENCY: CONSTANT
PAIN SEVERITY GOAL: 3/10
LOWEST PAIN SEVERITY IN PAST 24 HOURS: 4/10
PAIN LOCATION - PAIN QUALITY: ACHE
PAIN: 1

## 2025-06-09 ASSESSMENT — ACTIVITIES OF DAILY LIVING (ADL)
WASHING_LB_CURRENT_FUNCTION: INDEPENDENT
DRESSING_LB_CURRENT_FUNCTION: INDEPENDENT
DRESSING_UB_CURRENT_FUNCTION: INDEPENDENT
WASHING_UPB_CURRENT_FUNCTION: INDEPENDENT

## 2025-06-09 NOTE — PROGRESS NOTES
Discharge Facility: [facility]   Discharge Diagnosis: Severe aortic stenosis   Admission Date: 5/30/25  Discharge Date:  6/5/25    PCP Appointment Date: Pt. Declined to schedule, he will see cardiology and call into PCP office with concerns.   Specialist Appointment Date: Cardiology 6/12/25  Hospital Encounter and Summary Linked: Admission (Discharged) with Zaida Molina MD (05/30/2025)   Discharge Summary by Leatha Harrison APRN-CNP (06/05/2025 08:26)   See discharge assessment below for further details    Wrap Up  Wrap Up Additional Comments: Pt. Reports he is doing well, but not great at home. He denies chest pain, SOB, n/v, chills during this tcm call. Pt. Reports to following safety measures post CABG x3 and AVR. There is an order for a repeat CBC, Mag, and Renal function panel to be completed by pt, pt. Voiced understanding the order is in place for him to have around 6/11/25. SN was in the home over the weekend, PT/OT to eval on this date. Denies further questions/concerns at this time. This callers contact information for non-emergent questions/concerns. (6/9/2025  8:35 AM)    Engagement  Call Start Time: -- (Call completed with Patient) (6/9/2025  8:35 AM)    Medications  Medications reviewed with patient/caregiver?: Yes (6/9/2025  8:35 AM)  Is the patient having any side effects they believe may be caused by any medication additions or changes?: No (6/9/2025  8:35 AM)  Does the patient have all medications ordered at discharge?: Yes (6/9/2025  8:35 AM)  Care Management Interventions: Provided patient education; No intervention needed (6/9/2025  8:35 AM)  Prescription Comments: START taking: acetaminophen (Tylenol) docusate sodium (Colace) furosemide (Lasix) Start taking on: June 6, 2025 metoprolol tartrate (Lopressor) multivitamin with minerals oxyCODONE (Roxicodone) polyethylene glycol (Glycolax, Miralax) CHANGE how you take: aspirin losartan (Cozaar) STOP taking: chlorhexidine 0.12 % solution  (Peridex) chlorhexidine 4 % external liquid (Hibiclens) dilTIAZem  mg 24 hr capsule (Tiazac) metoprolol succinate XL 25 mg 24 hr tablet (Toprol-XL) nitroglycerin 0.4 mg SL tablet (Nitrostat) (6/9/2025  8:35 AM)  Is the patient taking all medications as directed (includes completed medication regime)?: Yes (6/9/2025  8:35 AM)  Care Management Interventions: Provided patient education (6/9/2025  8:35 AM)  Medication Comments: Pt,. voiced he has all medications, and denies any questions/concerns during this TCM call (6/9/2025  8:35 AM)    Appointments  Does the patient have a primary care provider?: Yes (6/9/2025  8:35 AM)  Care Management Interventions: Educated patient on importance of making appointment; Advised patient to make appointment (6/9/2025  8:35 AM)  Has the patient kept scheduled appointments due by today?: Yes (6/9/2025  8:35 AM)  Care Management Interventions: Educated on importance of keeping appointment (6/9/2025  8:35 AM)    Self Management  What is the home health agency?: [facility], SN PT/Ot (6/9/2025  8:35 AM)  Has home health visited the patient within 72 hours of discharge?: Yes (6/9/2025  8:35 AM)  What Durable Medical Equipment (DME) was ordered?: n/a (6/9/2025  8:35 AM)    Patient Teaching  Does the patient have access to their discharge instructions?: Yes (6/9/2025  8:35 AM)  Care Management Interventions: Reviewed instructions with patient (6/9/2025  8:35 AM)  What is the patient's perception of their health status since discharge?: Improving (6/9/2025  8:35 AM)  Is the patient/caregiver able to teach back the hierarchy of who to call/visit for symptoms/problems? PCP, Specialist, Home Health nurse, Urgent Care, ED, 911: Yes (6/9/2025  8:35 AM)  Patient/Caregiver Education Comments: Pt. denies any new questions/concerns (6/9/2025  8:35 AM)

## 2025-06-11 ENCOUNTER — HOME CARE VISIT (OUTPATIENT)
Dept: HOME HEALTH SERVICES | Facility: HOME HEALTH | Age: 75
End: 2025-06-11
Payer: COMMERCIAL

## 2025-06-11 DIAGNOSIS — Z95.2 S/P AVR: ICD-10-CM

## 2025-06-11 DIAGNOSIS — Z95.1 S/P CABG (CORONARY ARTERY BYPASS GRAFT): ICD-10-CM

## 2025-06-11 LAB
LABORATORY COMMENT REPORT: NORMAL
PATH REPORT.FINAL DX SPEC: NORMAL
PATH REPORT.GROSS SPEC: NORMAL
PATH REPORT.RELEVANT HX SPEC: NORMAL
PATH REPORT.TOTAL CANCER: NORMAL

## 2025-06-11 NOTE — PROGRESS NOTES
A telephone visit (audio only) between Franky Sauceda (at the originating site) and the provider (at the distant site) was utilized to provide this telehealth service.    Patient is having a telehealth nurse visit today following hospital discharge on June 5, 2025.    Patient is 13 days s/p CABG x 3 / aortic valve replacement / MAZE / BRENT clip with Dr. Molina.  He feels well and has minimal incision discomfort.  He states that he has some back muscle discomfort and is using heat and OTC pain creams as needed.  Sternal incision is closed without redness or drainage.  Leg graft sites are dry with some mild redness.  Patient and home care nurse are monitoring and posting photos in chart.   Patient has some mild exertional shortness of breath that resolves with rest.  Encouraged continued use of incentive spirometer to increase lung expansion.  Patient is ambulating often with periods of rest as needed and has some edema of the lower extremities, which is improving.  I instructed him to call our office if leg swelling worsens.  His appetite is good and no issues reported with constipation.   Patient is taking all medications as prescribed.    Patient will schedule a cardiology follow-up visit with Dr. Soni.  He has a post-op visit with Dr. Molina scheduled for July 16 with a chest x-ray prior to the visit.  Patient  will call our office with any questions or concerns.  It was a pleasure speaking to Franky Sauceda today.

## 2025-06-12 ENCOUNTER — TELEMEDICINE CLINICAL SUPPORT (OUTPATIENT)
Dept: CARDIAC SURGERY | Facility: HOSPITAL | Age: 75
End: 2025-06-12
Payer: COMMERCIAL

## 2025-06-12 ENCOUNTER — HOME CARE VISIT (OUTPATIENT)
Dept: HOME HEALTH SERVICES | Facility: HOME HEALTH | Age: 75
End: 2025-06-12
Payer: COMMERCIAL

## 2025-06-16 ENCOUNTER — TELEPHONE (OUTPATIENT)
Dept: CARDIOLOGY | Facility: CLINIC | Age: 75
End: 2025-06-16
Payer: COMMERCIAL

## 2025-06-16 NOTE — TELEPHONE ENCOUNTER
Pt LM asking for an appointment.  He was just discharged from the hospital and is 1 week out from open heart.  Please advise

## 2025-06-17 ENCOUNTER — HOME CARE VISIT (OUTPATIENT)
Dept: HOME HEALTH SERVICES | Facility: HOME HEALTH | Age: 75
End: 2025-06-17
Payer: COMMERCIAL

## 2025-06-17 VITALS
RESPIRATION RATE: 16 BRPM | OXYGEN SATURATION: 98 % | SYSTOLIC BLOOD PRESSURE: 114 MMHG | HEART RATE: 70 BPM | TEMPERATURE: 98.1 F | DIASTOLIC BLOOD PRESSURE: 70 MMHG

## 2025-06-17 DIAGNOSIS — Z95.2 S/P AVR: ICD-10-CM

## 2025-06-17 DIAGNOSIS — Z95.1 S/P CABG (CORONARY ARTERY BYPASS GRAFT): ICD-10-CM

## 2025-06-17 DIAGNOSIS — E11.9 TYPE 2 DIABETES MELLITUS WITHOUT COMPLICATION, WITHOUT LONG-TERM CURRENT USE OF INSULIN: ICD-10-CM

## 2025-06-17 PROCEDURE — G0300 HHS/HOSPICE OF LPN EA 15 MIN: HCPCS

## 2025-06-17 ASSESSMENT — ENCOUNTER SYMPTOMS
CHANGE IN APPETITE: UNCHANGED
LAST BOWEL MOVEMENT: 67373
OCCASIONAL FEELINGS OF UNSTEADINESS: 0
DENIES PAIN: 1
APPETITE LEVEL: GOOD

## 2025-06-17 ASSESSMENT — ACTIVITIES OF DAILY LIVING (ADL)
AMBULATION ASSISTANCE: 1
AMBULATION ASSISTANCE: INDEPENDENT

## 2025-06-17 NOTE — TELEPHONE ENCOUNTER
Pt calling for refills on his Losartan 25 mg, Metformin 500 mg, Metoprolol 50 mg, DDM. LOV 4/16/2025.

## 2025-06-18 RX ORDER — METOPROLOL TARTRATE 50 MG/1
50 TABLET ORAL 2 TIMES DAILY
Qty: 180 TABLET | Refills: 3 | Status: SHIPPED | OUTPATIENT
Start: 2025-06-18 | End: 2026-06-18

## 2025-06-18 RX ORDER — METFORMIN HYDROCHLORIDE 500 MG/1
1000 TABLET ORAL 2 TIMES DAILY
Qty: 360 TABLET | Refills: 3 | Status: SHIPPED | OUTPATIENT
Start: 2025-06-18 | End: 2025-06-19 | Stop reason: SDUPTHER

## 2025-06-18 RX ORDER — LOSARTAN POTASSIUM 25 MG/1
25 TABLET ORAL DAILY
Qty: 90 TABLET | Refills: 3 | Status: SHIPPED | OUTPATIENT
Start: 2025-06-18 | End: 2026-06-18

## 2025-06-19 DIAGNOSIS — E11.9 TYPE 2 DIABETES MELLITUS WITHOUT COMPLICATION, WITHOUT LONG-TERM CURRENT USE OF INSULIN: ICD-10-CM

## 2025-06-19 RX ORDER — METFORMIN HYDROCHLORIDE 500 MG/1
500 TABLET ORAL 2 TIMES DAILY
Qty: 180 TABLET | Refills: 3 | Status: SHIPPED | OUTPATIENT
Start: 2025-06-19

## 2025-06-23 ENCOUNTER — PATIENT OUTREACH (OUTPATIENT)
Dept: PRIMARY CARE | Facility: CLINIC | Age: 75
End: 2025-06-23
Payer: COMMERCIAL

## 2025-06-23 NOTE — PROGRESS NOTES
Unable to reach patient for follow up call after recent hospitalization.   Left voicemail with call back number for patient to call if needed  to assist with any questions or concerns patient may have.    Pt. Has not had follow up with PCP post hospital discharge as of this note

## 2025-06-25 ENCOUNTER — HOME CARE VISIT (OUTPATIENT)
Dept: HOME HEALTH SERVICES | Facility: HOME HEALTH | Age: 75
End: 2025-06-25
Payer: COMMERCIAL

## 2025-06-25 ENCOUNTER — APPOINTMENT (OUTPATIENT)
Dept: PRIMARY CARE | Facility: CLINIC | Age: 75
End: 2025-06-25
Payer: COMMERCIAL

## 2025-06-25 VITALS
WEIGHT: 216 LBS | TEMPERATURE: 97.3 F | SYSTOLIC BLOOD PRESSURE: 116 MMHG | BODY MASS INDEX: 31.99 KG/M2 | HEIGHT: 69 IN | DIASTOLIC BLOOD PRESSURE: 72 MMHG

## 2025-06-25 DIAGNOSIS — Z09 HOSPITAL DISCHARGE FOLLOW-UP: ICD-10-CM

## 2025-06-25 DIAGNOSIS — E11.9 TYPE 2 DIABETES MELLITUS WITHOUT COMPLICATION, WITHOUT LONG-TERM CURRENT USE OF INSULIN: Primary | ICD-10-CM

## 2025-06-25 DIAGNOSIS — I25.10 ATHEROSCLEROTIC CARDIOVASCULAR DISEASE: ICD-10-CM

## 2025-06-25 DIAGNOSIS — I10 ESSENTIAL HYPERTENSION: ICD-10-CM

## 2025-06-25 DIAGNOSIS — Z95.2 H/O AORTIC VALVE REPLACEMENT: ICD-10-CM

## 2025-06-25 DIAGNOSIS — I48.91 ATRIAL FIBRILLATION, UNSPECIFIED TYPE (MULTI): ICD-10-CM

## 2025-06-25 PROCEDURE — 1160F RVW MEDS BY RX/DR IN RCRD: CPT | Performed by: NURSE PRACTITIONER

## 2025-06-25 PROCEDURE — 99215 OFFICE O/P EST HI 40 MIN: CPT | Performed by: NURSE PRACTITIONER

## 2025-06-25 PROCEDURE — 1111F DSCHRG MED/CURRENT MED MERGE: CPT | Performed by: NURSE PRACTITIONER

## 2025-06-25 PROCEDURE — 1036F TOBACCO NON-USER: CPT | Performed by: NURSE PRACTITIONER

## 2025-06-25 PROCEDURE — 4010F ACE/ARB THERAPY RXD/TAKEN: CPT | Performed by: NURSE PRACTITIONER

## 2025-06-25 PROCEDURE — G0299 HHS/HOSPICE OF RN EA 15 MIN: HCPCS

## 2025-06-25 PROCEDURE — 3051F HG A1C>EQUAL 7.0%<8.0%: CPT | Performed by: NURSE PRACTITIONER

## 2025-06-25 PROCEDURE — 1159F MED LIST DOCD IN RCRD: CPT | Performed by: NURSE PRACTITIONER

## 2025-06-25 PROCEDURE — 3078F DIAST BP <80 MM HG: CPT | Performed by: NURSE PRACTITIONER

## 2025-06-25 PROCEDURE — 3074F SYST BP LT 130 MM HG: CPT | Performed by: NURSE PRACTITIONER

## 2025-06-25 ASSESSMENT — PATIENT HEALTH QUESTIONNAIRE - PHQ9
1. LITTLE INTEREST OR PLEASURE IN DOING THINGS: NOT AT ALL
2. FEELING DOWN, DEPRESSED OR HOPELESS: NOT AT ALL
SUM OF ALL RESPONSES TO PHQ9 QUESTIONS 1 AND 2: 0

## 2025-06-25 NOTE — PROGRESS NOTES
"Patient: Franky Sauceda  : 1950  PCP: TONY Cerna-CNP  MRN: [id number]  Program: Transitional Care Management  Status: Enrolled  Effective Dates: 2025 - present  Responsible Staff: Jimmy Alberts LPN  Social Drivers to be Addressed: No information to display    Here with wife Kathryn today.     Franky Sauceda is a 74 y.o. male presenting today for follow-up after being discharged from the hospital 20 days ago. The main problem requiring admission was Severe aortic stenosis. Also had a CABG x 3 (Dr. Zaida Molina)  The discharge summary and/or Transitional Care Management documentation was reviewed. Medication reconciliation was performed as indicated via the \"Edward as Reviewed\" timestamp.   - admission    Franky Sauceda was contacted by Transitional Care Management services two days after his discharge. This encounter and supporting documentation was reviewed.  History of Present Illness  Franky Sauceda is a 74 year old male who presents for follow-up regarding his recovery and medication management after major cardiac procedures.    He is recovering from several major cardiac procedures and has noticed changes in his heart rhythm, particularly at night. He perceives his heartbeat as faster than before, describing it as 'twice as fast as it used to be'. His pulse, previously in the thirties and forties, is now in the seventies and eighties. He has a history of atrial fibrillation noted post-surgery but currently feels his heart rate is regular.    He has experienced significant weight loss, currently weighing 216 pounds, down from a maximum of 265 pounds. Before surgery, he weighed approximately 235 pounds. He monitors his blood sugar levels three times a day, with occasional low readings in the middle of the night, such as 68 and 69, which wake him up. He manages these episodes by eating bread or drinking lemonade. His highest sugar reading post-surgery was 169, which occurred " shortly after returning home.    He reports sleep disturbances, waking up in the middle of the night and staying awake for a couple of hours, which he attributes to decreased physical activity post-surgery. He experiences 'little cat naps' during the day, which may affect his nighttime sleep. He also notes a hoarse voice, which he attributes to intubation during surgery, and sneezing, which he describes as 'the worst' due to the pressure it causes.    He experiences discomfort in his chest and back, particularly around the left shoulder blade and the leg where veins were harvested. The right leg is numb from the knee to the hip, with the lateral side being the most affected. He describes the numbness as 'real numb, real, real' and sensitive to touch. He has not been taking any pain medication other than Tylenol.  No rashes.    He is currently taking aspirin 81 mg twice a day, metformin twice a day, and Ozempic with weekly injections on Fridays. He was previously on diltiazem 300 mg daily, but it was not continued post-surgery, and he is concerned about his blood pressure being higher without it. He engages in light physical activities such as walking around the house, climbing stairs, and picking up sticks outside, but he tires quickly.    He is self-employed and has been given flexible time off work to recover.  Back on Ozempic  Shots on Friday      Review of Systems   Constitutional:  Positive for activity change, appetite change, fatigue and unexpected weight change. Negative for chills and fever.   HENT:  Negative for congestion.    Respiratory:  Negative for chest tightness.    Cardiovascular:  Positive for chest pain. Negative for palpitations and leg swelling.        Pain at surgical site with cough or sneeze   Gastrointestinal:  Negative for abdominal distention, abdominal pain and constipation.   Endocrine: Negative for cold intolerance, heat intolerance, polydipsia, polyphagia and polyuria.  "  Genitourinary:  Negative for difficulty urinating.   Musculoskeletal:  Positive for back pain.   Skin:  Negative for rash.   Neurological:  Negative for dizziness and weakness.   Psychiatric/Behavioral:  Positive for sleep disturbance.        Results  LABS  Continuous Blood Glucose Monitor: 69, 68, 169    DIAGNOSTIC  Echocardiogram: Performed  Transesophageal Echocardiogram: Performed     /72   Temp 36.3 °C (97.3 °F)   Ht 1.753 m (5' 9\")   Wt 98 kg (216 lb)   BMI 31.90 kg/m²     Physical Exam  Vitals and nursing note reviewed.   Constitutional:       Appearance: Normal appearance.   HENT:      Head: Normocephalic and atraumatic.      Right Ear: Tympanic membrane, ear canal and external ear normal.      Left Ear: Tympanic membrane, ear canal and external ear normal.      Mouth/Throat:      Mouth: Mucous membranes are moist.   Cardiovascular:      Rate and Rhythm: Normal rate and regular rhythm.      Pulses: Normal pulses.      Heart sounds: Normal heart sounds.   Pulmonary:      Effort: Pulmonary effort is normal.      Breath sounds: Normal breath sounds.   Chest:          Comments: Closure in place.  No bleeding or seeping noted.    Edges in place    Abdominal:      General: Bowel sounds are normal.      Palpations: Abdomen is soft.   Musculoskeletal:      Cervical back: Normal range of motion and neck supple.   Skin:     General: Skin is warm.      Comments: Midchest without streaking or redness.  Wound coverage in place.    Right leg vein site scabbed closed.       Neurological:      Mental Status: He is alert and oriented to person, place, and time.   Psychiatric:         Mood and Affect: Mood normal.         Behavior: Behavior normal.         Thought Content: Thought content normal.         Judgment: Judgment normal.         The complexity of medical decision making for this patient's transitional care is moderate.  Assessment & Plan  Postoperative Recovery from Cardiac Surgery  Recovering from " multiple major cardiac procedures. Reports increased awareness of heartbeat, now faster than before. Pulse improved from 30s and 40s to 70s and 80s. Experiences numbness and sensitivity in the leg where veins were harvested, expected to improve over time. Hoarseness likely due to intubation during surgery, sneezing causes discomfort. No significant chest pain, but some discomfort and sensitivity at the surgical site. Not taking any pain medications other than acetaminophen. Engaging in light activities but tires easily. Cardiac rehab discussed as beneficial for monitored recovery, providing reassurance and controlled cardiovascular workout under supervision to prevent overexertion.  - Discuss cardiac rehab options with cardiology team.  - Monitor heart rate and rhythm for irregularities.  - Encourage gradual increase in activity level under supervision.  - Educate on splinting techniques for sneezing and coughing.  - Continue acetaminophen for pain management as needed.    Hypertension  Previously on diltiazem, discontinued post-surgery without clear communication. Blood pressure higher than usual, possibly due to absence of diltiazem. Clinical pharmacist consulted to determine if diltiazem should be resumed.  - Consult with clinical pharmacist regarding the resumption of diltiazem.  - Monitor blood pressure regularly.    Diabetes Mellitus  Blood sugar levels well-controlled with occasional nocturnal hypoglycemia, managed by consuming carbohydrates. On metformin and Ozempic, with recent adjustment to metformin dosage. Significant weight loss since surgery may contribute to improved glycemic control.  - Continue current diabetes management regimen with metformin and Ozempic.  - Monitor blood sugar levels regularly, especially at night.  - Educate on managing hypoglycemia with dietary adjustments.    General Health Maintenance  Advised to elevate legs to prevent swelling and engage in regular movement to promote  circulation. Encouraged to consider wearing a medical alert bracelet due to diabetes and cardiac history. Discussed the importance of having medical information readily accessible in emergencies.  - Advise on leg elevation and regular movement to prevent swelling.  - Consider obtaining a medical alert bracelet for diabetes and cardiac history.    Follow-up  Several follow-up appointments scheduled with various healthcare providers, including cardiology and surgery.  - Attend follow-up appointment with Arlyn on July 9th.  - Attend follow-up appointment with the surgeon on July 16th.  - Attend follow-up appointment with Dr. Haines on September 2nd.  - Keep scheduled appointment in October.    Recording duration: 48 minutes     Assessment & Plan  Type 2 diabetes mellitus without complication, without long-term current use of insulin         Essential hypertension         Atrial fibrillation, unspecified type (Multi)         Hospital discharge follow-up         H/O aortic valve replacement          Stable curently.  Advance activity as tolerated.  This medical note was created with the assistance of artificial intelligence (AI) for documentation purposes. The content has been reviewed and confirmed by the healthcare provider for accuracy and completeness. Patient consented to the use of audio recording and use of AI during their visit.

## 2025-06-26 LAB
ALBUMIN SERPL-MCNC: 4 G/DL (ref 3.6–5.1)
BUN SERPL-MCNC: 25 MG/DL (ref 7–25)
BUN/CREAT SERPL: 18 (CALC) (ref 6–22)
CALCIUM SERPL-MCNC: 9.6 MG/DL (ref 8.6–10.3)
CHLORIDE SERPL-SCNC: 105 MMOL/L (ref 98–110)
CO2 SERPL-SCNC: 25 MMOL/L (ref 20–32)
CREAT SERPL-MCNC: 1.39 MG/DL (ref 0.7–1.28)
EGFRCR SERPLBLD CKD-EPI 2021: 53 ML/MIN/1.73M2
ERYTHROCYTE [DISTWIDTH] IN BLOOD BY AUTOMATED COUNT: 13.6 % (ref 11–15)
GLUCOSE SERPL-MCNC: 95 MG/DL (ref 65–139)
HCT VFR BLD AUTO: 36.6 % (ref 38.5–50)
HGB BLD-MCNC: 11.5 G/DL (ref 13.2–17.1)
MAGNESIUM SERPL-MCNC: 1.6 MG/DL (ref 1.5–2.5)
MCH RBC QN AUTO: 30.5 PG (ref 27–33)
MCHC RBC AUTO-ENTMCNC: 31.4 G/DL (ref 32–36)
MCV RBC AUTO: 97.1 FL (ref 80–100)
PHOSPHATE SERPL-MCNC: 4 MG/DL (ref 2.1–4.3)
PLATELET # BLD AUTO: 299 THOUSAND/UL (ref 140–400)
PMV BLD REES-ECKER: 10.3 FL (ref 7.5–12.5)
POTASSIUM SERPL-SCNC: 4.6 MMOL/L (ref 3.5–5.3)
RBC # BLD AUTO: 3.77 MILLION/UL (ref 4.2–5.8)
SODIUM SERPL-SCNC: 141 MMOL/L (ref 135–146)
WBC # BLD AUTO: 9.6 THOUSAND/UL (ref 3.8–10.8)

## 2025-06-27 VITALS
BODY MASS INDEX: 31.9 KG/M2 | TEMPERATURE: 97.5 F | DIASTOLIC BLOOD PRESSURE: 62 MMHG | OXYGEN SATURATION: 98 % | WEIGHT: 216 LBS | SYSTOLIC BLOOD PRESSURE: 112 MMHG | HEART RATE: 72 BPM

## 2025-06-27 SDOH — ECONOMIC STABILITY: GENERAL

## 2025-06-27 ASSESSMENT — ACTIVITIES OF DAILY LIVING (ADL)
GROOMING ASSESSED: 1
FEEDING: SUPERVISION
AMBULATION ASSISTANCE: 1
FEEDING ASSESSED: 1
CURRENT_FUNCTION: SUPERVISION
AMBULATION ASSISTANCE: SUPERVISION
GROOMING_CURRENT_FUNCTION: SUPERVISION
PHYSICAL TRANSFERS ASSESSED: 1
MONEY MANAGEMENT (EXPENSES/BILLS): NEEDS ASSISTANCE

## 2025-06-27 ASSESSMENT — ENCOUNTER SYMPTOMS
DENIES PAIN: 1
LAST BOWEL MOVEMENT: 67381
PERSON REPORTING PAIN: PATIENT
FATIGUE: 1

## 2025-07-02 ENCOUNTER — HOME CARE VISIT (OUTPATIENT)
Dept: HOME HEALTH SERVICES | Facility: HOME HEALTH | Age: 75
End: 2025-07-02
Payer: COMMERCIAL

## 2025-07-09 ENCOUNTER — OFFICE VISIT (OUTPATIENT)
Dept: CARDIOLOGY | Facility: CLINIC | Age: 75
End: 2025-07-09
Payer: COMMERCIAL

## 2025-07-09 ENCOUNTER — TELEPHONE (OUTPATIENT)
Dept: CARDIOLOGY | Facility: CLINIC | Age: 75
End: 2025-07-09

## 2025-07-09 VITALS
WEIGHT: 216 LBS | HEIGHT: 69 IN | SYSTOLIC BLOOD PRESSURE: 142 MMHG | DIASTOLIC BLOOD PRESSURE: 79 MMHG | HEART RATE: 86 BPM | BODY MASS INDEX: 31.99 KG/M2

## 2025-07-09 DIAGNOSIS — E78.2 MIXED HYPERLIPIDEMIA: ICD-10-CM

## 2025-07-09 DIAGNOSIS — Z95.2 S/P AORTIC VALVE REPLACEMENT: ICD-10-CM

## 2025-07-09 DIAGNOSIS — I10 ESSENTIAL HYPERTENSION: ICD-10-CM

## 2025-07-09 DIAGNOSIS — I25.10 CORONARY ARTERY DISEASE INVOLVING NATIVE CORONARY ARTERY OF NATIVE HEART WITHOUT ANGINA PECTORIS: ICD-10-CM

## 2025-07-09 DIAGNOSIS — I25.10 CORONARY ARTERY DISEASE INVOLVING NATIVE CORONARY ARTERY OF NATIVE HEART WITHOUT ANGINA PECTORIS: Primary | ICD-10-CM

## 2025-07-09 DIAGNOSIS — I48.91 ATRIAL FIBRILLATION, UNSPECIFIED TYPE (MULTI): ICD-10-CM

## 2025-07-09 DIAGNOSIS — I35.0 SEVERE AORTIC STENOSIS: ICD-10-CM

## 2025-07-09 DIAGNOSIS — Z95.1 S/P CABG (CORONARY ARTERY BYPASS GRAFT): ICD-10-CM

## 2025-07-09 PROBLEM — H53.9 VISUAL DISTURBANCE: Status: RESOLVED | Noted: 2024-08-31 | Resolved: 2025-07-09

## 2025-07-09 PROBLEM — E66.01 SEVERE OBESITY (MULTI): Status: RESOLVED | Noted: 2024-04-16 | Resolved: 2025-07-09

## 2025-07-09 LAB
ATRIAL RATE: 76 BPM
P AXIS: 104 DEGREES
P OFFSET: 194 MS
P ONSET: 153 MS
PR INTERVAL: 142 MS
Q ONSET: 224 MS
QRS COUNT: 12 BEATS
QRS DURATION: 76 MS
QT INTERVAL: 414 MS
QTC CALCULATION(BAZETT): 465 MS
QTC FREDERICIA: 447 MS
R AXIS: 1 DEGREES
T AXIS: 110 DEGREES
T OFFSET: 431 MS
VENTRICULAR RATE: 76 BPM

## 2025-07-09 PROCEDURE — 1036F TOBACCO NON-USER: CPT | Performed by: NURSE PRACTITIONER

## 2025-07-09 PROCEDURE — 99212 OFFICE O/P EST SF 10 MIN: CPT

## 2025-07-09 PROCEDURE — 99215 OFFICE O/P EST HI 40 MIN: CPT | Performed by: NURSE PRACTITIONER

## 2025-07-09 PROCEDURE — 3078F DIAST BP <80 MM HG: CPT | Performed by: NURSE PRACTITIONER

## 2025-07-09 PROCEDURE — 3077F SYST BP >= 140 MM HG: CPT | Performed by: NURSE PRACTITIONER

## 2025-07-09 PROCEDURE — 3051F HG A1C>EQUAL 7.0%<8.0%: CPT | Performed by: NURSE PRACTITIONER

## 2025-07-09 PROCEDURE — 1159F MED LIST DOCD IN RCRD: CPT | Performed by: NURSE PRACTITIONER

## 2025-07-09 PROCEDURE — 4010F ACE/ARB THERAPY RXD/TAKEN: CPT | Performed by: NURSE PRACTITIONER

## 2025-07-09 PROCEDURE — 93005 ELECTROCARDIOGRAM TRACING: CPT | Performed by: NURSE PRACTITIONER

## 2025-07-09 PROCEDURE — 1160F RVW MEDS BY RX/DR IN RCRD: CPT | Performed by: NURSE PRACTITIONER

## 2025-07-09 RX ORDER — LOSARTAN POTASSIUM 50 MG/1
50 TABLET ORAL DAILY
Qty: 90 TABLET | Refills: 3 | Status: SHIPPED | OUTPATIENT
Start: 2025-07-09 | End: 2026-07-09

## 2025-07-09 ASSESSMENT — ENCOUNTER SYMPTOMS
CHILLS: 0
FEVER: 0
SLEEP DISTURBANCE: 1
FATIGUE: 1
BACK PAIN: 1
CONSTIPATION: 0
WEAKNESS: 0
POLYPHAGIA: 0
APPETITE CHANGE: 1
ABDOMINAL DISTENTION: 0
UNEXPECTED WEIGHT CHANGE: 1
PALPITATIONS: 0
ACTIVITY CHANGE: 1
DIZZINESS: 0
ABDOMINAL PAIN: 0
DIFFICULTY URINATING: 0
CHEST TIGHTNESS: 0
POLYDIPSIA: 0

## 2025-07-09 NOTE — PROGRESS NOTES
Chief Complaint:   Hospital follow up     History Of Present Illness:    Franky Sauceda is a 74 y.o. male here for hospital follow up.     Patient saw Dr. Soni for annual visit in April where his aortic valve stenosis was noted to be severe. LHC showed significant LM coronary artery disease and triple vessel disease. He underwent CABG x3 and AVR with Dr. Molina on 5/30/25. Discharge weight 104Kg. Discharged in NSR s/p MAZE.      Today he is doing very well. Notes numbness above right leg graft site. Denies chest pain, SOB. Ambulating without complaint. Denies palpitations. Notes some orthostatic lightheadedness, which he had prior to surgery.     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), Coronary artery disease involving native coronary artery of native heart without angina pectoris (04/02/2024), Dorsalgia, unspecified (09/27/2016), Encounter for immunization (06/29/2018), Fatigue (04/10/2023), GERD (gastroesophageal reflux disease), History of recent steroid use, Hyperlipidemia, Hypertension, Localized edema (07/07/2022), Moderate aortic stenosis (04/02/2024), PAF (paroxysmal atrial fibrillation) (Multi) (04/02/2024), Pain in left knee (12/08/2021), Pain in unspecified knee (04/28/2020), Personal history of other diseases of the circulatory system, Personal history of other diseases of urinary system (07/07/2022), Personal history of other infectious and parasitic diseases (05/14/2018), Severe aortic stenosis (04/02/2024), Severe obesity (Multi) (04/16/2024), Sleep apnea, Sprain of other specified parts of left knee, initial encounter (01/02/2020), Type 2 diabetes mellitus, Unilateral primary osteoarthritis, left knee (08/20/2019), and Visual disturbance (08/31/2024).    Past Surgical  "History:  He has a past surgical history that includes Colonoscopy (01/28/2014); Esophagogastroduodenoscopy (01/28/2014); Cholecystectomy (01/28/2014); and Cardiac catheterization (N/A, 5/9/2025).      Social History:  He reports that he has never smoked. He has never been exposed to tobacco smoke. He has never used smokeless tobacco. He reports that he does not currently use alcohol. He reports that he does not use drugs.    Family History:  Family History[1]      Allergies:  Cerivastatin, Jardiance [empagliflozin], Mounjaro [tirzepatide], Nifedipine, Statins-hmg-coa reductase inhibitors, and Icosapent ethyl    Review of Systems  All pertinent systems have been reviewed and are negative except for what is stated in the history of present illness.    All other systems have been reviewed and are negative and noncontributory to this patient's current ailments.     Visit Vitals  /79   Pulse 86   Ht 1.753 m (5' 9\")   Wt 98 kg (216 lb)   BMI 31.90 kg/m²   Smoking Status Never   BSA 2.18 m²       Last Labs:  CBC -  Lab Results   Component Value Date    WBC 9.6 06/25/2025    HGB 11.5 (L) 06/25/2025    HCT 36.6 (L) 06/25/2025    MCV 97.1 06/25/2025     06/25/2025       CMP -  Lab Results   Component Value Date    CALCIUM 9.6 06/25/2025    PHOS 4.0 06/25/2025    PROT 6.6 05/23/2025    PROT 6.5 04/16/2025    ALBUMIN 4.0 06/25/2025    AST 13 05/23/2025    AST 14 04/16/2025    ALT 15 05/23/2025    ALT 14 04/16/2025    ALKPHOS 92 05/23/2025    ALKPHOS 90 04/16/2025    BILITOT 0.8 05/23/2025    BILITOT 1.0 04/16/2025    BUN 25 06/25/2025    CREATININE 1.39 (H) 06/25/2025       LIPID PANEL -   Lab Results   Component Value Date    CHOL 146 04/16/2025    CHOL 153 04/12/2023    TRIG 165 (H) 04/16/2025    TRIG 188 (A) 04/12/2023    HDL 40 04/16/2025    HDL 36 04/12/2023    CHHDL 3.7 04/16/2025    CHHDL 4.2 04/12/2023    VLDL 26 10/14/2024    NHDL 106 04/16/2025       RENAL FUNCTION PANEL -   Lab Results   Component Value " Date    GLUCOSE 95 06/25/2025     06/25/2025    K 4.6 06/25/2025     06/25/2025    CO2 25 06/25/2025    ANIONGAP 12 06/05/2025    BUN 25 06/25/2025    CREATININE 1.39 (H) 06/25/2025    GFRMALE 67 04/12/2023    CALCIUM 9.6 06/25/2025    PHOS 4.0 06/25/2025    ALBUMIN 4.0 06/25/2025        Lab Results   Component Value Date    BNP 10 08/02/2024    HGBA1C 7.0 (H) 05/23/2025    HGBA1C 7.4 (H) 04/16/2025    HGBA1C 6.1 04/12/2023         Objective   Vitals reviewed.   Constitutional:       Appearance: Healthy appearance. Not in distress.   Eyes:      Conjunctiva/sclera: Conjunctivae normal.   Neck:      Vascular: No JVR. JVD normal.   Pulmonary:      Effort: Pulmonary effort is normal.      Breath sounds: Normal breath sounds. No wheezing. No rhonchi. No rales.   Chest:      Chest wall: Not tender to palpatation.   Cardiovascular:      PMI at left midclavicular line. Normal rate. Regular rhythm. Normal S1. Normal S2.       No gallop.  No click. No rub.      Comments: Midsternal incision and chest tube sites well healed   Edema:     Peripheral edema absent.   Abdominal:      General: Bowel sounds are normal.      Tenderness: There is no abdominal tenderness.   Musculoskeletal: Normal range of motion.         General: No tenderness. Skin:     General: Skin is warm and dry.   Neurological:      General: No focal deficit present.      Mental Status: Alert and oriented to person, place and time.   Psychiatric:         Attention and Perception: Attention normal.         Mood and Affect: Mood normal.       Assessment/Plan   Diagnoses and all orders for this visit:  Coronary artery disease involving native coronary artery of native heart without angina pectoris  - s/p CABG  - denies Chest pain  - EKG NSR at 76bpm with anterior/lateral TWI  - continue asa and lipitor   Severe aortic stenosis  - s/p AVR  S/P aortic valve replacement  - doing very well  - abx for dental work   - annual TTE  Atrial fibrillation,  unspecified type (Multi)  - s/p MAZE  -  maintaining NSR  - continue metoprolol  - continue eliquis for now (patient was asymptomatic in afib)  Essential hypertension  - elevated   - was taken off diltiazem post surgery  - home readings consistently 140/90  - increase losartan to 50mg   Mixed hyperlipidemia  - stable  - continue lipitor   S/P CABG (coronary artery bypass graft)  - lipitor/asa  - cardiac rehab ordered     Follow up 2 months    Outpatient Medications:  Current Outpatient Medications   Medication Instructions    acetaminophen (TYLENOL) 650 mg, oral, Every 6 hours PRN    aspirin 81 mg, oral, Daily    atorvastatin (LIPITOR) 80 mg, oral, Daily    Blood glucose monitoring meter kit kit 1 each, As needed    blood sugar diagnostic (True Metrix Glucose Test Strip) strip 100 strips, miscellaneous, Daily    cholecalciferol (Vitamin D-3) 50 mcg (2,000 unit) capsule 1 capsule, Daily    Eliquis 5 mg, oral, 2 times daily    FreeStyle Jaun 3 [place] misc Use as instructed    FreeStyle Jaun 3 Sensor device Change sensor every 14 days as directed. Rotate sites.    lancets misc 100 Lancets, miscellaneous, Daily    losartan (COZAAR) 50 mg, oral, Daily    metFORMIN (GLUCOPHAGE) 500 mg, oral, 2 times daily    metoprolol tartrate (LOPRESSOR) 50 mg, oral, 2 times daily    multivitamin with minerals tablet 1 tablet, oral, Daily    pantoprazole (PROTONIX) 40 mg, oral, Daily    semaglutide (Ozempic) 0.25 mg or 0.5 mg (2 mg/3 mL) pen injector INJECT 0.5MG UNDER THE SKIN ONCE A WEEK AS DIRECTED     Exclusive of any other services or procedures performed, I, Arlyn JIMENEZ-RON, spent 45 minutes in duration for this visit today.  This time consisted of chart review, obtaining history, and/or performing the exam as documented above, as well as, documenting clinical information for the encounter in the electronic record. In addition to the history, testing, notes, and labs I have noted above; I have reviewed cardiac note from  April, op note, hospital notes/labs, post op TTE 6/3, EKG from today. Reviewed [facility]               [1]   Family History  Problem Relation Name Age of Onset    Hypertension Mother      Other (abdominal aortic aneurysm) Father      Hyperlipidemia Father      Hypertension Father

## 2025-07-11 ENCOUNTER — HOME CARE VISIT (OUTPATIENT)
Dept: HOME HEALTH SERVICES | Facility: HOME HEALTH | Age: 75
End: 2025-07-11
Payer: COMMERCIAL

## 2025-07-14 DIAGNOSIS — I10 ESSENTIAL HYPERTENSION: Primary | ICD-10-CM

## 2025-07-14 RX ORDER — AMLODIPINE BESYLATE 5 MG/1
5 TABLET ORAL DAILY
Qty: 30 TABLET | Refills: 0 | Status: SHIPPED | OUTPATIENT
Start: 2025-07-14 | End: 2026-07-14

## 2025-07-15 NOTE — PROGRESS NOTES
Chief Complaint  Post-op evaluation    HPI:  Mr. Franky Sauceda is a 74-year-old male with a history of symptomatic severe aortic stenosis, multivessel coronary artery disease, paroxysmal atrial fibrillation, and atypical atrial flutter s/p CABG x3 (LIMA to LAD, SVG to PLV, SVG to OM), bioprosthetic aortic valve replacement with a 23mm Inspiris valve, left atrial Maze procedure, left atrial appendage clip, and posterior pericardial window on 5/30/25. His postoperative course was uncomplicated and he was discharged home on 6/5/25. He presents now for postoperative follow up. His pain has been well controlled without medication. He denies dyspnea on exertion. He is tolerating his diet and his appetite has returned to baseline. He is having regular bowel function without constipation.     Medical History[1]    Surgical History[2]    Family History[3]    Social History     Socioeconomic History    Marital status:      Spouse name: Not on file    Number of children: Not on file    Years of education: Not on file    Highest education level: Not on file   Occupational History    Not on file   Tobacco Use    Smoking status: Never     Passive exposure: Never    Smokeless tobacco: Never   Vaping Use    Vaping status: Never Used   Substance and Sexual Activity    Alcohol use: Not Currently    Drug use: Never    Sexual activity: Yes   Other Topics Concern    Not on file   Social History Narrative    Not on file     Social Drivers of Health     Financial Resource Strain: Low Risk  (8/2/2024)    Overall Financial Resource Strain (CARDIA)     Difficulty of Paying Living Expenses: Not hard at all   Food Insecurity: Not on file   Transportation Needs: No Transportation Needs (6/7/2025)    OASIS : Transportation     Lack of Transportation (Medical): No     Lack of Transportation (Non-Medical): No     Patient Unable or Declines to Respond: No   Physical Activity: Not on file   Stress: Not on file   Social Connections:  Feeling Socially Integrated (6/7/2025)    OASIS : Social Isolation     Frequency of experiencing loneliness or isolation: Never   Intimate Partner Violence: Not on file   Housing Stability: Low Risk  (8/2/2024)    Housing Stability Vital Sign     Unable to Pay for Housing in the Last Year: No     Number of Times Moved in the Last Year: 0     Homeless in the Last Year: No       RX Allergies[4]    Encounter Medications[5]    Physical Exam  Constitutional:       General: He is not in acute distress.     Appearance: Normal appearance. He is not ill-appearing.   HENT:      Head: Normocephalic and atraumatic.   Cardiovascular:      Rate and Rhythm: Normal rate and regular rhythm.   Pulmonary:      Effort: Pulmonary effort is normal. No respiratory distress.      Breath sounds: No wheezing.   Musculoskeletal:      Right lower leg: No edema.      Left lower leg: No edema.   Skin:     General: Skin is warm and dry.      Comments: Well-healed sternal incision   Neurological:      Mental Status: He is alert and oriented to person, place, and time. Mental status is at baseline.   Psychiatric:         Mood and Affect: Mood normal.         Behavior: Behavior normal.     Encounter Date: 07/09/25   ECG 12 lead (Clinic Performed)   Result Value    Ventricular Rate 76    Atrial Rate 76    MS Interval 142    QRS Duration 76    QT Interval 414    QTC Calculation(Bazett) 465    P Axis 104    R Axis 1    T Axis 110    QRS Count 12    Q Onset 224    P Onset 153    P Offset 194    T Offset 431    QTC Fredericia 447    Narrative    Normal sinus rhythm  ST & T wave abnormality, consider anterolateral ischemia  Prolonged QT  Abnormal ECG  When compared with ECG of 30-MAY-2025 15:00,  Premature ventricular complexes are no longer Present  Sinus rhythm is no longer with ventricular escape complexes  T wave inversion now evident in Anterolateral leads       Lab Results   Component Value Date    WBC 9.6 06/25/2025    HGB 11.5 (L) 06/25/2025     HCT 36.6 (L) 06/25/2025    MCV 97.1 06/25/2025     06/25/2025     Lab Results   Component Value Date    GLUCOSE 95 06/25/2025    CALCIUM 9.6 06/25/2025     06/25/2025    K 4.6 06/25/2025    CO2 25 06/25/2025     06/25/2025    BUN 25 06/25/2025    CREATININE 1.39 (H) 06/25/2025     Transthoracic Echo Complete  Result Date: 6/4/2025   [facility], [address] [place] [address]                Tel [phone] and Fax [phone] TRANSTHORACIC ECHOCARDIOGRAM REPORT  Patient Name:       ALISHA You Physician:    [id number] Raeann Snow MD Study Date:         6/3/2025            Ordering Provider:    [id number] ASMITA CIFUENTES MRN/PID:            [id number]            Fellow:               [id number] Jerrod Avitia MD Accession#:         [id number]        Nurse: Date of Birth/Age:  1950 / 74      Sonographer:          Kaushik faye                                     [facility] Gender assigned at  M                   Additional Staff: Birth: Height:             175.26 cm           Admit Date:           5/21/2025 Weight:             108.86 kg           Admission Status:     Inpatient -                                                               Routine BSA / BMI:          2.23 m2 / 35.44     Encounter#:           [id number]                     kg/m2 Blood Pressure:     134/76 mmHg         Department Location:  [place] [address] Study Type:    TRANSTHORACIC ECHO (TTE) COMPLETE Diagnosis/ICD: Presence of prosthetic heart valve-Z95.2; Presence of                aortocoronary bypass graft-Z95.1 Indication:    s/p CABG and AVR CPT Code:      Echo Complete w Full Doppler-42512 Patient History:  Pertinent History: NIDDM, HTN, atypical atrial flutter, paroxysmal atrial                    fibrillation, CAD, and TIA who now presents s/p AVR (23mm                    Inspirus Resilia Bioprosthetic Aortic Valve), CABG x3, BRENT                    clip, MAZE, posterior pericardial window, done: 5/30/25. Study Detail: The following Echo studies were performed: 2D, M-Mode, Doppler and               color flow. Technically challenging study due to body habitus and               poor acoustic windows. Definity used as a contrast agent for               endocardial border definition. Total contrast used for this               procedure was 4.0 mL via IV push.  PHYSICIAN INTERPRETATION: Left Ventricle: Left ventricular ejection fraction is hyperdynamic by visual estimate at 70-75%. There are no regional left ventricular wall motion abnormalities. The left ventricular cavity size is normal. There is normal septal and mildly increased posterior left ventricular wall thickness. There is left ventricular concentric remodeling. Abnormal (paradoxical) septal motion consistent with post-operative status. Spectral Doppler shows an abnormal pattern of left ventricular diastolic filling. There is no definite left ventricular thrombus visualized. Left Atrium: The left atrial size is normal. Right Ventricle: The right ventricle is normal in size. There is low normal right ventricular systolic function. Right Atrium: The right atrium is normal in size. Aortic Valve: There is a prosthetic aortic valve present. The aortic valve area by VTI is 1.54 cmï¿½ with a peak velocity of 3.29 m/s. The peak and mean gradients are 38 mmHg and 22 mmHg, respectively, with a dimensionless index of 0.44. There is an Rojas bioprosthetic type aortic valve bioprosthesis with a 23 mm reported size. There is no evidence of aortic valve regurgitation. S/p 23mm Inspiris Resilia bioprosthetic AVR with gradients of 43/22mmHg and DI of 0.44 and no AI. Mitral  Valve: The mitral valve is normal in structure. There is mild to moderate mitral annular calcification. There is trace mitral valve regurgitation. The E Vmax is 1.48 m/s. Tricuspid Valve: The tricuspid valve is structurally normal. There is trace tricuspid regurgitation. The doppler estimated RVSP is mildly elevated with a right ventricular systolic pressure of 40 mmHg. Pulmonic Valve: The pulmonic valve is structurally normal. There is trace pulmonic valve regurgitation. Pericardium: Small pericardial effusion. Aorta: The aortic root is normal. There is mild dilatation of the ascending aorta. There is no dilatation of the aortic root. Systemic Veins: The inferior vena cava appears dilated, with IVC inspiratory collapse greater than 50%. In comparison to the previous echocardiogram(s): Compared with study dated 5/30/2025, the prior study was an intra-operative CHAD, the gradients post AVR across the valve were not well assessed. The mean gradient at that time was estimated at 18mmHg and has now slightly increased to 22mmHg, but the LV is quite dynamic today. This is the first postoperative TTE following AVR for severe AS.  CONCLUSIONS:  1. Left ventricular ejection fraction is hyperdynamic by visual estimate at 70-75%.  2. Poorly visualized anatomical structures due to suboptimal image quality.  3. Spectral Doppler shows an abnormal pattern of left ventricular diastolic filling.  4. Abnormal septal motion consistent with post-operative status.  5. No left ventricular thrombus visualized.  6. There is low normal right ventricular systolic function.  7. Small pericardial effusion.  8. The doppler estimated RVSP is mildly elevated with a right ventricular systolic pressure of 40 mmHg.  9. S/p 23mm Inspiris Resilia bioprosthetic AVR with gradients of 43/22mmHg and DI of 0.44 and no AI. 10. There is an Rojas bioprosthetic type aortic valve bioprosthesis with a 23 mm reported size. The peak and mean gradients are 43 mmHg  and 22 mmHg respectively. 11. Compared with study dated 5/30/2025, the prior study was an intra-operative CHAD, the gradients post AVR across the valve were not well assessed. The mean gradient at that time was estimated at 18mmHg and has now slightly increased to 22mmHg, but the LV is quite dynamic today. This is the first postoperative TTE following AVR for severe AS. RECOMMENDATIONS: Utilizing an FDA cleared automated machine learning algorithm ([employer] Heart Failure by [employer]), the analysis of the apical 4-chamber echocardiogram suggests the presence of heart failure with preserved ejection fraction (HFpEF)*. Clinical correlation looking for additional heart failure signs and symptoms is recommended, as a definite diagnosis of heart failure cannot be made by imaging alone. *Per ACC/AHA/HFSA universal diagnosis of heart failure, HFpEF is defined as 1) signs and symptoms leading to clinical diagnosis of heart failure, 2) an ejection fraction of at least 50%, and 3) evidence of elevated intra-cardiac filling pressures by echocardiography, BNP elevation, or catheterization.  QUANTITATIVE DATA SUMMARY:  2D MEASUREMENTS:          Normal Ranges: Ao Root d:       3.20 cm  (2.0-3.7cm) LAs:             4.30 cm  (2.7-4.0cm) IVSd:            1.00 cm  (0.6-1.1cm) LVPWd:           1.10 cm  (0.6-1.1cm) LVIDd:           4.30 cm  (3.9-5.9cm) LVIDs:           2.90 cm LV Mass Index:   68 g/m2 LVEDV Index:     55 ml/m2 LV % FS          32.6 %  LEFT ATRIUM:                  Normal Ranges: LA Vol A4C:        78.5 ml    (22+/-6mL/m2) LA Vol A2C:        59.5 ml LA Vol BP:         68.6 ml LA Vol Index A4C:  35.1ml/m2 LA Vol Index A2C:  26.7 ml/m2 LA Vol Index BP:   30.7 ml/m2 LA Area A4C:       24.7 cm2 LA Area A2C:       21.6 cm2 LA Major Axis A4C: 6.6 cm LA Major Axis A2C: 6.7 cm LA Volume Index:   30.8 ml/m2  RIGHT ATRIUM:          Normal Ranges: RA Area A4C:  17.6 cm2  AORTA MEASUREMENTS:         Normal Ranges: Asc Ao, d:           3.80 cm (2.1-3.4cm)  LV SYSTOLIC FUNCTION:                      Normal Ranges: EF-A4C View:    66 % (>=55%) EF-A2C View:    66 % EF-Biplane:     66 % EF-Visual:      73 % LV EF Reported: 73 %  LV DIASTOLIC FUNCTION:             Normal Ranges: MV Peak E:             1.48 m/s    (0.7-1.2 m/s) MV Peak A:             0.72 m/s    (0.42-0.7 m/s) E/A Ratio:             2.06        (1.0-2.2) MV e'                  0.069 m/s   (>8.0) MV lateral e'          0.07 m/s MV medial e'           0.07 m/s MV A Dur:              135.00 msec E/e' Ratio:            21.42       (<8.0) MV DT:                 211 msec    (150-240 msec)  AORTIC VALVE:                      Normal Ranges: AoV Vmax:                3.29 m/s  (<=1.7m/s) AoV Peak P.3 mmHg (<20mmHg) AoV Mean P.0 mmHg (1.7-11.5mmHg) LVOT Max Yon:            1.41 m/s  (<=1.1m/s) AoV VTI:                 53.60 cm  (18-25cm) LVOT VTI:                23.80 cm LVOT Diameter:           2.10 cm   (1.8-2.4cm) AoV Area, VTI:           1.54 cm2  (2.5-5.5cm2) AoV Area,Vmax:           1.48 cm2  (2.5-4.5cm2) AoV Dimensionless Index: 0.44  RIGHT VENTRICLE: RV Basal 3.70 cm RV Mid   2.50 cm RV Major 6.7 cm TAPSE:   14.7 mm RV s'    0.09 m/s  TRICUSPID VALVE/RVSP:          Normal Ranges: Peak TR Velocity:     2.81 m/s Est. RA Pressure:     8 RV Syst Pressure:     40       (< 30mmHg) IVC Diam:             2.29 cm  PULMONIC VALVE:          Normal Ranges: PV Max Yon:     1.4 m/s  (0.6-0.9m/s) PV Max P.2 mmHg  [id number] Raeann Snow MD Electronically signed on 6/3/2025 at 4:27:44 PM  ** Final **     Transthoracic echo (TTE) complete  Result Date: 4/15/2025        [facility] [place], [place] [address]        Tel [phone] and Fax [phone] TRANSTHORACIC ECHOCARDIOGRAM REPORT  Patient Name:       ALISHA SANCHEZ    Reading Physician:    [id number] Loy Soni                                                                MD Study Date:         4/15/2025           Ordering Provider:    [id number] KAYLIE XIONG MRN/PID:            [id number]            Fellow: Accession#:         [id number]        Nurse: Date of Birth/Age:  1950 / 74      Sonographer:          Kiera faye                                     RDNURIA Gender assigned at  M                   Additional Staff: Birth: Height:             175.26 cm           Admit Date:           4/15/2025 Weight:             103.42 kg           Admission Status:     Outpatient BSA / BMI:          2.18 m2 / 33.67     Encounter#:           [id number]                     kg/m2 Blood Pressure:     /                   Department Location:  [facility] Study Type:    TRANSTHORACIC ECHO (TTE) COMPLETE Diagnosis/ICD: Nonrheumatic aortic (valve) stenosis-I35.0 Indication:    AS CPT Code:      Echo Complete w Full Doppler-[id number]  Study Detail: The following Echo studies were performed: 2D, M-Mode, Doppler and               color flow. A bubble study was not performed.  PHYSICIAN INTERPRETATION: Left Ventricle: The left ventricular systolic function is hyperdynamic, with a Vasquez's biplane calculated ejection fraction of 76%. There are no regional left ventricular wall motion abnormalities. The left ventricular cavity size is normal. There is normal septal and normal posterior left ventricular wall thickness. Left ventricular diastolic filling was not assessed. Left Atrium: The left atrium is mildly dilated. A bubble study using agitated saline was not performed. Right Ventricle: The right ventricle is normal in size. There is normal right ventricular global systolic function. Right Atrium: The right atrium is normal in size. Aortic Valve: The aortic valve is trileaflet. There is severe aortic valve cusp calcification. There is severe aortic valve  thickening. There is reduced systolic aortic valve leaflet excursion. There is evidence of severe aortic valve stenosis. The aortic valve dimensionless index is 0.30. There is mild aortic valve regurgitation. The peak instantaneous gradient of the aortic valve is 90 mmHg. The mean gradient of the aortic valve is 55 mmHg. Mitral Valve: The mitral valve is mildly thickened. There is mild mitral annular calcification. There is mild mitral valve regurgitation. Tricuspid Valve: The tricuspid valve is structurally normal. There is mild tricuspid regurgitation. Pulmonic Valve: The pulmonic valve is structurally normal. There is physiologic pulmonic valve regurgitation. Pericardium: There is no pericardial effusion noted. Aorta: The aortic root is normal. Pulmonary Artery: The tricuspid regurgitant velocity is 3.17 m/s, and with an estimated right atrial pressure of 3 mmHg, the estimated pulmonary artery pressure is mild to moderately elevated with the RVSP at 43.2 mmHg. In comparison to the previous echocardiogram(s): Compared with study dated 4/2/2024, AS has progressed. RVSP has increased.  CONCLUSIONS:  1. The left ventricular systolic function is hyperdynamic, with a Vasquez's biplane calculated ejection fraction of 76%.  2. The left atrium is mildly dilated.  3. Severe aortic valve stenosis.  4. There is severe aortic valve cusp calcification.  5. There is severe aortic valve thickening.  6. Mild aortic valve regurgitation.  7. Mild to moderately elevated pulmonary artery pressure. QUANTITATIVE DATA SUMMARY:  2D MEASUREMENTS:          Normal Ranges: LAs:             4.11 cm  (2.7-4.0cm) RVIDd:           1.98 cm  (0.9-3.6cm) IVSd:            1.03 cm  (0.6-1.1cm) LVPWd:           0.98 cm  (0.6-1.1cm) LVIDd:           5.36 cm  (3.9-5.9cm) LVIDs:           2.91 cm LV Mass Index:   94 g/m2 LVEDV Index:     42 ml/m2 LV % FS          45.7 %  LEFT ATRIUM:                  Normal Ranges: LA Vol A4C:        70.9 ml     (22+/-6mL/m2) LA Vol A2C:        89.2 ml LA Vol BP:         81.4 ml LA Vol Index A4C:  32.5ml/m2 LA Vol Index A2C:  40.8 ml/m2 LA Vol Index BP:   37.3 ml/m2 LA Area A4C:       23.1 cm2 LA Area A2C:       25.3 cm2 LA Major Axis A4C: 6.4 cm LA Major Axis A2C: 6.1 cm LA Vol A4C:        65.8 ml LA Vol A2C:        84.2 ml LA Vol Index BSA:  34.3 ml/m2  RIGHT ATRIUM:                 Normal Ranges: RA Vol A4C:        29.2 ml    (8.3-19.5ml) RA Vol Index A4C:  13.4 ml/m2 RA Area A4C:       13.5 cm2 RA Major Axis A4C: 5.3 cm  LV SYSTOLIC FUNCTION:                      Normal Ranges: EF-A4C View:    79 % (>=55%) EF-A2C View:    70 % EF-Biplane:     76 % LV EF Reported: 76 %  LV DIASTOLIC FUNCTION:           Normal Ranges: MV e'                  0.103 m/s (>8.0) MV lateral e'          0.11 m/s MV medial e'           0.10 m/s  AORTIC VALVE:                      Normal Ranges: AoV Vmax:                4.74 m/s  (<=1.7m/s) AoV Peak P.7 mmHg (<20mmHg) AoV Mean P.1 mmHg (1.7-11.5mmHg) LVOT Max Yon:            1.44 m/s  (<=1.1m/s) AoV VTI:                 120.70 cm (18-25cm) LVOT VTI:                35.64 cm LVOT Diameter:           2.00 cm   (1.8-2.4cm) AoV Area, VTI:           0.93 cm2  (2.5-5.5cm2) AoV Area,Vmax:           0.95 cm2  (2.5-4.5cm2) AoV Dimensionless Index: 0.30  RIGHT VENTRICLE: RV Basal 2.90 cm RV Major 7.7 cm TAPSE:   24.0 mm RV s'    0.15 m/s  TRICUSPID VALVE/RVSP:          Normal Ranges: Peak TR Velocity:     3.17 m/s Est. RA Pressure:     3 mmHg RV Syst Pressure:     43 mmHg  (< 30mmHg)  PULMONIC VALVE:          Normal Ranges: PV Accel Time:  103 msec (>120ms) PV Max Yon:     1.0 m/s  (0.6-0.9m/s) PV Max P.1 mmHg  AORTA: Asc Ao Diam 3.37 cm  [id number] Loy Soni MD Electronically signed on 4/15/2025 at 1:24:21 PM  ** Final **        Assessment and Plan:   Mr. Franky Sauceda is a 74-year-old male with a history of symptomatic severe aortic stenosis, multivessel coronary  artery disease, paroxysmal atrial fibrillation, and atypical atrial flutter s/p CABG x3 (LIMA to LAD, SVG to PLV, SVG to OM), bioprosthetic aortic valve replacement with a 23mm Inspiris valve, left atrial Maze procedure, left atrial appendage clip, and posterior pericardial window on 5/30/25, recovering well     - He may resume regular activity without restriction (postoperative week 6 - 7/11/25)   - He has been referred to cardiac rehab  - He will continue to follow up with his PCP and cardiology   - He was advised to contact our office with questions or concerns     Thank you for the opportunity to participate in his care.     Zaida Molina MD  Cardiac Surgeon  Division of Cardiac Surgery  [facility] [place]       [1]   Past Medical History:  Diagnosis Date    Arrhythmia     Bence Huang proteinuria 07/27/2013    Bence Huang proteinuria    Coronary artery disease involving native coronary artery of native heart without angina pectoris 04/02/2024    CAC 2101 (2017)    Dorsalgia, unspecified 09/27/2016    Back pain, acute    Encounter for immunization 06/29/2018    Need for shingles vaccine    Fatigue 04/10/2023    GERD (gastroesophageal reflux disease)     History of recent steroid use     Hyperlipidemia     Hypertension     Localized edema 07/07/2022    Edema of extremities    Moderate aortic stenosis 04/02/2024    PAF (paroxysmal atrial fibrillation) (Multi) 04/02/2024    Pain in left knee 12/08/2021    Acute pain of left knee    Pain in unspecified knee 04/28/2020    Joint pain, knee    Personal history of other diseases of the circulatory system     Personal history of coronary atherosclerosis    Personal history of other diseases of urinary system 07/07/2022    History of renal insufficiency syndrome    Personal history of other infectious and parasitic diseases 05/14/2018    History of herpes labialis    Severe aortic stenosis 04/02/2024    Severe obesity (Multi)  04/16/2024    Sleep apnea     no CPAP being used    Sprain of other specified parts of left knee, initial encounter 01/02/2020    Injury of meniscus of left knee, initial encounter    Type 2 diabetes mellitus     Unilateral primary osteoarthritis, left knee 08/20/2019    Arthritis of knee, left    Visual disturbance 08/31/2024   [2]   Past Surgical History:  Procedure Laterality Date    CARDIAC CATHETERIZATION N/A 5/9/2025    Procedure: LHC, With LV;  Surgeon: Loy Soni MD;  Location: [facility];  Service: Cardiovascular;  Laterality: N/A;    CHOLECYSTECTOMY  01/28/2014    Cholecystectomy    COLONOSCOPY  01/28/2014    Complete Colonoscopy    ESOPHAGOGASTRODUODENOSCOPY  01/28/2014    Diagnostic Esophagogastroduodenoscopy   [3]   Family History  Problem Relation Name Age of Onset    Hypertension Mother      Other (abdominal aortic aneurysm) Father      Hyperlipidemia Father      Hypertension Father     [4]   Allergies  Allergen Reactions    Cerivastatin Other     Back/neck stiffness    Jardiance [Empagliflozin] Unknown    Mounjaro [Tirzepatide] GI Upset    Nifedipine Other     Urinary urgency    Statins-Hmg-Coa Reductase Inhibitors Other     Joint pain/stiffness    Icosapent Ethyl Palpitations   [5]   Outpatient Encounter Medications as of 7/16/2025   Medication Sig Dispense Refill    acetaminophen (Tylenol) 325 mg tablet Take 2 tablets (650 mg) by mouth every 6 hours if needed for mild pain (1 - 3).      amLODIPine (Norvasc) 5 mg tablet Take 1 tablet (5 mg) by mouth once daily. 30 tablet 0    aspirin 81 mg EC tablet Take 1 tablet (81 mg) by mouth once daily. Do not fill before May 10, 2025. (Patient taking differently: Take 1 tablet (81 mg) by mouth once daily. Takes one tablet twice a day) 90 tablet 3    atorvastatin (Lipitor) 80 mg tablet Take 1 tablet (80 mg) by mouth once daily. 90 tablet 3    Blood glucose monitoring meter kit kit 1 each if needed.      blood sugar diagnostic (True Metrix Glucose  Test Strip) strip 100 strips once daily. 100 strip 3    cholecalciferol (Vitamin D-3) 50 mcg (2,000 unit) capsule Take 1 capsule (50 mcg) by mouth once daily.      Eliquis 5 mg tablet Take 1 tablet (5 mg) by mouth 2 times a day. 180 tablet 3    FreeStyle Jaun 3 [place] misc Use as instructed 1 each 0    FreeStyle Jaun 3 Sensor device Change sensor every 14 days as directed. Rotate sites. 2 each 3    lancets misc 100 Lancets once daily. 100 each 3    losartan (Cozaar) 50 mg tablet Take 1 tablet (50 mg) by mouth once daily. 90 tablet 3    metFORMIN (Glucophage) 500 mg tablet Take 1 tablet (500 mg) by mouth 2 times a day. 180 tablet 3    metoprolol tartrate (Lopressor) 50 mg tablet Take 1 tablet by mouth 2 times a day. 180 tablet 3    multivitamin with minerals tablet Take 1 tablet by mouth once daily.      pantoprazole (ProtoNix) 40 mg EC tablet Take 1 tablet (40 mg) by mouth once daily. 90 tablet 3    semaglutide (Ozempic) 0.25 mg or 0.5 mg (2 mg/3 mL) pen injector INJECT 0.5MG UNDER THE SKIN ONCE A WEEK AS DIRECTED 3 mL 2    [DISCONTINUED] losartan (Cozaar) 25 mg tablet Take 1 tablet (25 mg) by mouth once daily. 90 tablet 3     No [email or web address] encounter medications on file as of 7/16/2025.

## 2025-07-16 ENCOUNTER — HOSPITAL ENCOUNTER (OUTPATIENT)
Dept: RADIOLOGY | Facility: CLINIC | Age: 75
Discharge: HOME | End: 2025-07-16
Payer: COMMERCIAL

## 2025-07-16 ENCOUNTER — OFFICE VISIT (OUTPATIENT)
Dept: CARDIAC SURGERY | Facility: CLINIC | Age: 75
End: 2025-07-16
Payer: COMMERCIAL

## 2025-07-16 VITALS
WEIGHT: 214 LBS | OXYGEN SATURATION: 95 % | HEART RATE: 77 BPM | SYSTOLIC BLOOD PRESSURE: 128 MMHG | DIASTOLIC BLOOD PRESSURE: 81 MMHG | BODY MASS INDEX: 31.6 KG/M2

## 2025-07-16 DIAGNOSIS — Z95.1 S/P CABG X 3: ICD-10-CM

## 2025-07-16 DIAGNOSIS — I25.10 CORONARY ARTERY DISEASE INVOLVING NATIVE CORONARY ARTERY OF NATIVE HEART WITHOUT ANGINA PECTORIS: ICD-10-CM

## 2025-07-16 DIAGNOSIS — Z95.2 S/P AVR (AORTIC VALVE REPLACEMENT): ICD-10-CM

## 2025-07-16 DIAGNOSIS — Z95.1 S/P CABG (CORONARY ARTERY BYPASS GRAFT): Primary | ICD-10-CM

## 2025-07-16 PROCEDURE — 71046 X-RAY EXAM CHEST 2 VIEWS: CPT | Performed by: RADIOLOGY

## 2025-07-16 PROCEDURE — 99024 POSTOP FOLLOW-UP VISIT: CPT | Performed by: STUDENT IN AN ORGANIZED HEALTH CARE EDUCATION/TRAINING PROGRAM

## 2025-07-16 PROCEDURE — 99212 OFFICE O/P EST SF 10 MIN: CPT | Mod: 25

## 2025-07-16 PROCEDURE — 71046 X-RAY EXAM CHEST 2 VIEWS: CPT

## 2025-07-16 ASSESSMENT — PAIN SCALES - GENERAL: PAINLEVEL_OUTOF10: 0-NO PAIN

## 2025-07-16 NOTE — LETTER
July 16, 2025     Kay Lucas, APRN-CNP  [address]  [facility], [address]  [facility] [address]    Patient: Franky Sauceda   YOB: 1950   Date of Visit: 7/16/2025       Dear Kay,     I recently operated on your patient, Mr. Franky Sauceda, for symptomatic severe aortic stenosis and multivessel coronary artery disease. My clinic note is attached for your review. Please feel free to call me with questions or concerns. I appreciate the opportunity to care for him.      Thank you,     Zaida Molina MD      CC: Arlyn Mast, APRN-CNP  ______________________________________________________________________________________    Chief Complaint  Post-op evaluation    HPI:  Mr. Franky Sauceda is a 74-year-old male with a history of symptomatic severe aortic stenosis, multivessel coronary artery disease, paroxysmal atrial fibrillation, and atypical atrial flutter s/p CABG x3 (LIMA to LAD, SVG to PLV, SVG to OM), bioprosthetic aortic valve replacement with a 23mm Inspiris valve, left atrial Maze procedure, left atrial appendage clip, and posterior pericardial window on 5/30/25. His postoperative course was uncomplicated and he was discharged home on 6/5/25. He presents now for postoperative follow up. His pain has been well controlled without medication. He denies dyspnea on exertion. He is tolerating his diet and his appetite has returned to baseline. He is having regular bowel function without constipation.     Medical History[1]    Surgical History[2]    Family History[3]    Social History     Socioeconomic History   • Marital status:      Spouse name: Not on file   • Number of children: Not on file   • Years of education: Not on file   • Highest education level: Not on file   Occupational History   • Not on file   Tobacco Use   • Smoking status: Never     Passive exposure: Never   • Smokeless tobacco: Never   Vaping Use   • Vaping status: Never Used   Substance and Sexual Activity   •  Alcohol use: Not Currently   • Drug use: Never   • Sexual activity: Yes   Other Topics Concern   • Not on file   Social History Narrative   • Not on file     Social Drivers of Health     Financial Resource Strain: Low Risk  (8/2/2024)    Overall Financial Resource Strain (CARDIA)    • Difficulty of Paying Living Expenses: Not hard at all   Food Insecurity: Not on file   Transportation Needs: No Transportation Needs (6/7/2025)    OASIS : Transportation    • Lack of Transportation (Medical): No    • Lack of Transportation (Non-Medical): No    • Patient Unable or Declines to Respond: No   Physical Activity: Not on file   Stress: Not on file   Social Connections: Feeling Socially Integrated (6/7/2025)    OASIS : Social Isolation    • Frequency of experiencing loneliness or isolation: Never   Intimate Partner Violence: Not on file   Housing Stability: Low Risk  (8/2/2024)    Housing Stability Vital Sign    • Unable to Pay for Housing in the Last Year: No    • Number of Times Moved in the Last Year: 0    • Homeless in the Last Year: No       RX Allergies[4]    Encounter Medications[5]    Physical Exam  Constitutional:       General: He is not in acute distress.     Appearance: Normal appearance. He is not ill-appearing.   HENT:      Head: Normocephalic and atraumatic.   Cardiovascular:      Rate and Rhythm: Normal rate and regular rhythm.   Pulmonary:      Effort: Pulmonary effort is normal. No respiratory distress.      Breath sounds: No wheezing.   Musculoskeletal:      Right lower leg: No edema.      Left lower leg: No edema.   Skin:     General: Skin is warm and dry.      Comments: Well-healed sternal incision   Neurological:      Mental Status: He is alert and oriented to person, place, and time. Mental status is at baseline.   Psychiatric:         Mood and Affect: Mood normal.         Behavior: Behavior normal.     Encounter Date: 07/09/25   ECG 12 lead (Clinic Performed)   Result Value    Ventricular Rate  76    Atrial Rate 76    MD Interval 142    QRS Duration 76    QT Interval 414    QTC Calculation(Bazett) 465    P Axis 104    R Axis 1    T Axis 110    QRS Count 12    Q Onset 224    P Onset 153    P Offset 194    T Offset 431    QTC Fredericia 447    Narrative    Normal sinus rhythm  ST & T wave abnormality, consider anterolateral ischemia  Prolonged QT  Abnormal ECG  When compared with ECG of 30-MAY-2025 15:00,  Premature ventricular complexes are no longer Present  Sinus rhythm is no longer with ventricular escape complexes  T wave inversion now evident in Anterolateral leads       Lab Results   Component Value Date    WBC 9.6 06/25/2025    HGB 11.5 (L) 06/25/2025    HCT 36.6 (L) 06/25/2025    MCV 97.1 06/25/2025     06/25/2025     Lab Results   Component Value Date    GLUCOSE 95 06/25/2025    CALCIUM 9.6 06/25/2025     06/25/2025    K 4.6 06/25/2025    CO2 25 06/25/2025     06/25/2025    BUN 25 06/25/2025    CREATININE 1.39 (H) 06/25/2025     Transthoracic Echo Complete  Result Date: 6/4/2025   [facility], [address] [place] [address]                Tel [phone] and Fax [phone] TRANSTHORACIC ECHOCARDIOGRAM REPORT  Patient Name:       ALISHA You Physician:    [id number] Raeann Snow MD Study Date:         6/3/2025            Ordering Provider:    [id number] ASMITA CIFUENTES MRN/PID:            [id number]            Fellow:               [id number] Jerrod Avitia MD Accession#:         [id number]        Nurse: Date of Birth/Age:  1950 / 74      Sonographer:          Kaushik faye                                     [facility] Gender assigned at                     Additional Staff: Birth: Height:             175.26 cm           Admit Date:            5/21/2025 Weight:             108.86 kg           Admission Status:     Inpatient -                                                               Routine BSA / BMI:          2.23 m2 / 35.44     Encounter#:           [id number]                     kg/m2 Blood Pressure:     134/76 mmHg         Department Location:  [place] [address] Study Type:    TRANSTHORACIC ECHO (TTE) COMPLETE Diagnosis/ICD: Presence of prosthetic heart valve-Z95.2; Presence of                aortocoronary bypass graft-Z95.1 Indication:    s/p CABG and AVR CPT Code:      Echo Complete w Full Doppler-59025 Patient History: Pertinent History: NIDDM, HTN, atypical atrial flutter, paroxysmal atrial                    fibrillation, CAD, and TIA who now presents s/p AVR (23mm                    Inspirus Resilia Bioprosthetic Aortic Valve), CABG x3, BRENT                    clip, MAZE, posterior pericardial window, done: 5/30/25. Study Detail: The following Echo studies were performed: 2D, M-Mode, Doppler and               color flow. Technically challenging study due to body habitus and               poor acoustic windows. Definity used as a contrast agent for               endocardial border definition. Total contrast used for this               procedure was 4.0 mL via IV push.  PHYSICIAN INTERPRETATION: Left Ventricle: Left ventricular ejection fraction is hyperdynamic by visual estimate at 70-75%. There are no regional left ventricular wall motion abnormalities. The left ventricular cavity size is normal. There is normal septal and mildly increased posterior left ventricular wall thickness. There is left ventricular concentric remodeling. Abnormal (paradoxical) septal motion consistent with post-operative status. Spectral Doppler shows an abnormal pattern of left ventricular diastolic filling. There is no definite left ventricular thrombus visualized. Left Atrium: The left atrial  size is normal. Right Ventricle: The right ventricle is normal in size. There is low normal right ventricular systolic function. Right Atrium: The right atrium is normal in size. Aortic Valve: There is a prosthetic aortic valve present. The aortic valve area by VTI is 1.54 cmï¿½ with a peak velocity of 3.29 m/s. The peak and mean gradients are 38 mmHg and 22 mmHg, respectively, with a dimensionless index of 0.44. There is an Rojas bioprosthetic type aortic valve bioprosthesis with a 23 mm reported size. There is no evidence of aortic valve regurgitation. S/p 23mm Inspiris Resilia bioprosthetic AVR with gradients of 43/22mmHg and DI of 0.44 and no AI. Mitral Valve: The mitral valve is normal in structure. There is mild to moderate mitral annular calcification. There is trace mitral valve regurgitation. The E Vmax is 1.48 m/s. Tricuspid Valve: The tricuspid valve is structurally normal. There is trace tricuspid regurgitation. The doppler estimated RVSP is mildly elevated with a right ventricular systolic pressure of 40 mmHg. Pulmonic Valve: The pulmonic valve is structurally normal. There is trace pulmonic valve regurgitation. Pericardium: Small pericardial effusion. Aorta: The aortic root is normal. There is mild dilatation of the ascending aorta. There is no dilatation of the aortic root. Systemic Veins: The inferior vena cava appears dilated, with IVC inspiratory collapse greater than 50%. In comparison to the previous echocardiogram(s): Compared with study dated 5/30/2025, the prior study was an intra-operative CHAD, the gradients post AVR across the valve were not well assessed. The mean gradient at that time was estimated at 18mmHg and has now slightly increased to 22mmHg, but the LV is quite dynamic today. This is the first postoperative TTE following AVR for severe AS.  CONCLUSIONS:  1. Left ventricular ejection fraction is hyperdynamic by visual estimate at 70-75%.  2. Poorly visualized anatomical structures  due to suboptimal image quality.  3. Spectral Doppler shows an abnormal pattern of left ventricular diastolic filling.  4. Abnormal septal motion consistent with post-operative status.  5. No left ventricular thrombus visualized.  6. There is low normal right ventricular systolic function.  7. Small pericardial effusion.  8. The doppler estimated RVSP is mildly elevated with a right ventricular systolic pressure of 40 mmHg.  9. S/p 23mm Inspiris Resilia bioprosthetic AVR with gradients of 43/22mmHg and DI of 0.44 and no AI. 10. There is an Rojas bioprosthetic type aortic valve bioprosthesis with a 23 mm reported size. The peak and mean gradients are 43 mmHg and 22 mmHg respectively. 11. Compared with study dated 5/30/2025, the prior study was an intra-operative CHAD, the gradients post AVR across the valve were not well assessed. The mean gradient at that time was estimated at 18mmHg and has now slightly increased to 22mmHg, but the LV is quite dynamic today. This is the first postoperative TTE following AVR for severe AS. RECOMMENDATIONS: Utilizing an FDA cleared automated machine learning algorithm (EchoGo Heart Failure by [employer]), the analysis of the apical 4-chamber echocardiogram suggests the presence of heart failure with preserved ejection fraction (HFpEF)*. Clinical correlation looking for additional heart failure signs and symptoms is recommended, as a definite diagnosis of heart failure cannot be made by imaging alone. *Per ACC/AHA/HFSA universal diagnosis of heart failure, HFpEF is defined as 1) signs and symptoms leading to clinical diagnosis of heart failure, 2) an ejection fraction of at least 50%, and 3) evidence of elevated intra-cardiac filling pressures by echocardiography, BNP elevation, or catheterization.  QUANTITATIVE DATA SUMMARY:  2D MEASUREMENTS:          Normal Ranges: Ao Root d:       3.20 cm  (2.0-3.7cm) LAs:             4.30 cm  (2.7-4.0cm) IVSd:            1.00 cm  (0.6-1.1cm)  LVPWd:           1.10 cm  (0.6-1.1cm) LVIDd:           4.30 cm  (3.9-5.9cm) LVIDs:           2.90 cm LV Mass Index:   68 g/m2 LVEDV Index:     55 ml/m2 LV % FS          32.6 %  LEFT ATRIUM:                  Normal Ranges: LA Vol A4C:        78.5 ml    (22+/-6mL/m2) LA Vol A2C:        59.5 ml LA Vol BP:         68.6 ml LA Vol Index A4C:  35.1ml/m2 LA Vol Index A2C:  26.7 ml/m2 LA Vol Index BP:   30.7 ml/m2 LA Area A4C:       24.7 cm2 LA Area A2C:       21.6 cm2 LA Major Axis A4C: 6.6 cm LA Major Axis A2C: 6.7 cm LA Volume Index:   30.8 ml/m2  RIGHT ATRIUM:          Normal Ranges: RA Area A4C:  17.6 cm2  AORTA MEASUREMENTS:         Normal Ranges: Asc Ao, d:          3.80 cm (2.1-3.4cm)  LV SYSTOLIC FUNCTION:                      Normal Ranges: EF-A4C View:    66 % (>=55%) EF-A2C View:    66 % EF-Biplane:     66 % EF-Visual:      73 % LV EF Reported: 73 %  LV DIASTOLIC FUNCTION:             Normal Ranges: MV Peak E:             1.48 m/s    (0.7-1.2 m/s) MV Peak A:             0.72 m/s    (0.42-0.7 m/s) E/A Ratio:             2.06        (1.0-2.2) MV e'                  0.069 m/s   (>8.0) MV lateral e'          0.07 m/s MV medial e'           0.07 m/s MV A Dur:              135.00 msec E/e' Ratio:            21.42       (<8.0) MV DT:                 211 msec    (150-240 msec)  AORTIC VALVE:                      Normal Ranges: AoV Vmax:                3.29 m/s  (<=1.7m/s) AoV Peak P.3 mmHg (<20mmHg) AoV Mean P.0 mmHg (1.7-11.5mmHg) LVOT Max Yon:            1.41 m/s  (<=1.1m/s) AoV VTI:                 53.60 cm  (18-25cm) LVOT VTI:                23.80 cm LVOT Diameter:           2.10 cm   (1.8-2.4cm) AoV Area, VTI:           1.54 cm2  (2.5-5.5cm2) AoV Area,Vmax:           1.48 cm2  (2.5-4.5cm2) AoV Dimensionless Index: 0.44  RIGHT VENTRICLE: RV Basal 3.70 cm RV Mid   2.50 cm RV Major 6.7 cm TAPSE:   14.7 mm RV s'    0.09 m/s  TRICUSPID VALVE/RVSP:          Normal Ranges: Peak TR  Velocity:     2.81 m/s Est. RA Pressure:     8 RV Syst Pressure:     40       (< 30mmHg) IVC Diam:             2.29 cm  PULMONIC VALVE:          Normal Ranges: PV Max Yon:     1.4 m/s  (0.6-0.9m/s) PV Max P.2 mmHg  [id number] Raeann Snow MD Electronically signed on 6/3/2025 at 4:27:44 PM  ** Final **     Transthoracic echo (TTE) complete  Result Date: 4/15/2025        [facility] [place], [place] [address] Mobile System [address] [place] [address]        Tel [phone] and Fax [phone] TRANSTHORACIC ECHOCARDIOGRAM REPORT  Patient Name:       ALISHA You Physician:    [id number] Soni MD Study Date:         4/15/2025           Ordering Provider:    Talib SONI MRN/PID:            [id number]            Fellow: Accession#:         [id number]        Nurse: Date of Birth/Age:  1950      Sonographer:          Kiera faye                                     [facility] Gender assigned at  M                   Additional Staff: Birth: Height:             175.26 cm           Admit Date:           4/15/2025 Weight:             103.42 kg           Admission Status:     Outpatient BSA / BMI:          2.18 m2 / 33.67     Encounter#:           [id number]                     kg/m2 Blood Pressure:     /                   Department Location:  [facility] Study Type:    TRANSTHORACIC ECHO (TTE) COMPLETE Diagnosis/ICD: Nonrheumatic aortic (valve) stenosis-I35.0 Indication:    AS CPT Code:      Echo Complete w Full Doppler-13027  Study Detail: The following Echo studies were performed: 2D, M-Mode, Doppler and               color flow. A bubble study was not performed.  PHYSICIAN INTERPRETATION: Left Ventricle: The left ventricular systolic function is hyperdynamic, with a Vasquez's  biplane calculated ejection fraction of 76%. There are no regional left ventricular wall motion abnormalities. The left ventricular cavity size is normal. There is normal septal and normal posterior left ventricular wall thickness. Left ventricular diastolic filling was not assessed. Left Atrium: The left atrium is mildly dilated. A bubble study using agitated saline was not performed. Right Ventricle: The right ventricle is normal in size. There is normal right ventricular global systolic function. Right Atrium: The right atrium is normal in size. Aortic Valve: The aortic valve is trileaflet. There is severe aortic valve cusp calcification. There is severe aortic valve thickening. There is reduced systolic aortic valve leaflet excursion. There is evidence of severe aortic valve stenosis. The aortic valve dimensionless index is 0.30. There is mild aortic valve regurgitation. The peak instantaneous gradient of the aortic valve is 90 mmHg. The mean gradient of the aortic valve is 55 mmHg. Mitral Valve: The mitral valve is mildly thickened. There is mild mitral annular calcification. There is mild mitral valve regurgitation. Tricuspid Valve: The tricuspid valve is structurally normal. There is mild tricuspid regurgitation. Pulmonic Valve: The pulmonic valve is structurally normal. There is physiologic pulmonic valve regurgitation. Pericardium: There is no pericardial effusion noted. Aorta: The aortic root is normal. Pulmonary Artery: The tricuspid regurgitant velocity is 3.17 m/s, and with an estimated right atrial pressure of 3 mmHg, the estimated pulmonary artery pressure is mild to moderately elevated with the RVSP at 43.2 mmHg. In comparison to the previous echocardiogram(s): Compared with study dated 4/2/2024, AS has progressed. RVSP has increased.  CONCLUSIONS:  1. The left ventricular systolic function is hyperdynamic, with a Vasquez's biplane calculated ejection fraction of 76%.  2. The left atrium is mildly  dilated.  3. Severe aortic valve stenosis.  4. There is severe aortic valve cusp calcification.  5. There is severe aortic valve thickening.  6. Mild aortic valve regurgitation.  7. Mild to moderately elevated pulmonary artery pressure. QUANTITATIVE DATA SUMMARY:  2D MEASUREMENTS:          Normal Ranges: LAs:             4.11 cm  (2.7-4.0cm) RVIDd:           1.98 cm  (0.9-3.6cm) IVSd:            1.03 cm  (0.6-1.1cm) LVPWd:           0.98 cm  (0.6-1.1cm) LVIDd:           5.36 cm  (3.9-5.9cm) LVIDs:           2.91 cm LV Mass Index:   94 g/m2 LVEDV Index:     42 ml/m2 LV % FS          45.7 %  LEFT ATRIUM:                  Normal Ranges: LA Vol A4C:        70.9 ml    (22+/-6mL/m2) LA Vol A2C:        89.2 ml LA Vol BP:         81.4 ml LA Vol Index A4C:  32.5ml/m2 LA Vol Index A2C:  40.8 ml/m2 LA Vol Index BP:   37.3 ml/m2 LA Area A4C:       23.1 cm2 LA Area A2C:       25.3 cm2 LA Major Axis A4C: 6.4 cm LA Major Axis A2C: 6.1 cm LA Vol A4C:        65.8 ml LA Vol A2C:        84.2 ml LA Vol Index BSA:  34.3 ml/m2  RIGHT ATRIUM:                 Normal Ranges: RA Vol A4C:        29.2 ml    (8.3-19.5ml) RA Vol Index A4C:  13.4 ml/m2 RA Area A4C:       13.5 cm2 RA Major Axis A4C: 5.3 cm  LV SYSTOLIC FUNCTION:                      Normal Ranges: EF-A4C View:    79 % (>=55%) EF-A2C View:    70 % EF-Biplane:     76 % LV EF Reported: 76 %  LV DIASTOLIC FUNCTION:           Normal Ranges: MV e'                  0.103 m/s (>8.0) MV lateral e'          0.11 m/s MV medial e'           0.10 m/s  AORTIC VALVE:                      Normal Ranges: AoV Vmax:                4.74 m/s  (<=1.7m/s) AoV Peak P.7 mmHg (<20mmHg) AoV Mean P.1 mmHg (1.7-11.5mmHg) LVOT Max Yon:            1.44 m/s  (<=1.1m/s) AoV VTI:                 120.70 cm (18-25cm) LVOT VTI:                35.64 cm LVOT Diameter:           2.00 cm   (1.8-2.4cm) AoV Area, VTI:           0.93 cm2  (2.5-5.5cm2) AoV Area,Vmax:           0.95 cm2   (2.5-4.5cm2) AoV Dimensionless Index: 0.30  RIGHT VENTRICLE: RV Basal 2.90 cm RV Major 7.7 cm TAPSE:   24.0 mm RV s'    0.15 m/s  TRICUSPID VALVE/RVSP:          Normal Ranges: Peak TR Velocity:     3.17 m/s Est. RA Pressure:     3 mmHg RV Syst Pressure:     43 mmHg  (< 30mmHg)  PULMONIC VALVE:          Normal Ranges: PV Accel Time:  103 msec (>120ms) PV Max Yon:     1.0 m/s  (0.6-0.9m/s) PV Max P.1 mmHg  AORTA: Asc Ao Diam 3.37 cm  [id number] Loy Soni MD Electronically signed on 4/15/2025 at 1:24:21 PM  ** Final **        Assessment and Plan:   Mr. Franky Sauceda is a 74-year-old male with a history of symptomatic severe aortic stenosis, multivessel coronary artery disease, paroxysmal atrial fibrillation, and atypical atrial flutter s/p CABG x3 (LIMA to LAD, SVG to PLV, SVG to OM), bioprosthetic aortic valve replacement with a 23mm Inspiris valve, left atrial Maze procedure, left atrial appendage clip, and posterior pericardial window on 25, recovering well     - He may resume regular activity without restriction (postoperative week 6 - 25)   - He has been referred to cardiac rehab  - He will continue to follow up with his PCP and cardiology   - He was advised to contact our office with questions or concerns     Thank you for the opportunity to participate in his care.     Zaida Molina MD  Cardiac Surgeon  Division of Cardiac Surgery  [facility] [place]       [1]  Past Medical History:  Diagnosis Date   • Arrhythmia    • Bence Huang proteinuria 2013    Bence Huang proteinuria   • Coronary artery disease involving native coronary artery of native heart without angina pectoris 2024    CAC 2101 (2017)   • Dorsalgia, unspecified 2016    Back pain, acute   • Encounter for immunization 2018    Need for shingles vaccine   • Fatigue 04/10/2023   • GERD (gastroesophageal reflux disease)    • History of recent steroid use    • Hyperlipidemia    •  Hypertension    • Localized edema 07/07/2022    Edema of extremities   • Moderate aortic stenosis 04/02/2024   • PAF (paroxysmal atrial fibrillation) (Multi) 04/02/2024   • Pain in left knee 12/08/2021    Acute pain of left knee   • Pain in unspecified knee 04/28/2020    Joint pain, knee   • Personal history of other diseases of the circulatory system     Personal history of coronary atherosclerosis   • Personal history of other diseases of urinary system 07/07/2022    History of renal insufficiency syndrome   • Personal history of other infectious and parasitic diseases 05/14/2018    History of herpes labialis   • Severe aortic stenosis 04/02/2024   • Severe obesity (Multi) 04/16/2024   • Sleep apnea     no CPAP being used   • Sprain of other specified parts of left knee, initial encounter 01/02/2020    Injury of meniscus of left knee, initial encounter   • Type 2 diabetes mellitus    • Unilateral primary osteoarthritis, left knee 08/20/2019    Arthritis of knee, left   • Visual disturbance 08/31/2024   [2]  Past Surgical History:  Procedure Laterality Date   • CARDIAC CATHETERIZATION N/A 5/9/2025    Procedure: LHC, With LV;  Surgeon: Loy Soni MD;  Location: [facility] Cardiac [facility] Lab;  Service: Cardiovascular;  Laterality: N/A;   • CHOLECYSTECTOMY  01/28/2014    Cholecystectomy   • COLONOSCOPY  01/28/2014    Complete Colonoscopy   • ESOPHAGOGASTRODUODENOSCOPY  01/28/2014    Diagnostic Esophagogastroduodenoscopy   [3]  Family History  Problem Relation Name Age of Onset   • Hypertension Mother     • Other (abdominal aortic aneurysm) Father     • Hyperlipidemia Father     • Hypertension Father     [4]  Allergies  Allergen Reactions   • Cerivastatin Other     Back/neck stiffness   • Jardiance [Empagliflozin] Unknown   • Mounjaro [Tirzepatide] GI Upset   • Nifedipine Other     Urinary urgency   • Statins-Hmg-Coa Reductase Inhibitors Other     Joint pain/stiffness   • Icosapent Ethyl Palpitations   [5]  Outpatient  Encounter Medications as of 7/16/2025   Medication Sig Dispense Refill   • acetaminophen (Tylenol) 325 mg tablet Take 2 tablets (650 mg) by mouth every 6 hours if needed for mild pain (1 - 3).     • amLODIPine (Norvasc) 5 mg tablet Take 1 tablet (5 mg) by mouth once daily. 30 tablet 0   • aspirin 81 mg EC tablet Take 1 tablet (81 mg) by mouth once daily. Do not fill before May 10, 2025. (Patient taking differently: Take 1 tablet (81 mg) by mouth once daily. Takes one tablet twice a day) 90 tablet 3   • atorvastatin (Lipitor) 80 mg tablet Take 1 tablet (80 mg) by mouth once daily. 90 tablet 3   • Blood glucose monitoring meter kit kit 1 each if needed.     • blood sugar diagnostic (True Metrix Glucose Test Strip) strip 100 strips once daily. 100 strip 3   • cholecalciferol (Vitamin D-3) 50 mcg (2,000 unit) capsule Take 1 capsule (50 mcg) by mouth once daily.     • Eliquis 5 mg tablet Take 1 tablet (5 mg) by mouth 2 times a day. 180 tablet 3   • FreeStyle Jaun 3 [place] misc Use as instructed 1 each 0   • FreeStyle Jaun 3 Sensor device Change sensor every 14 days as directed. Rotate sites. 2 each 3   • lancets misc 100 Lancets once daily. 100 each 3   • losartan (Cozaar) 50 mg tablet Take 1 tablet (50 mg) by mouth once daily. 90 tablet 3   • metFORMIN (Glucophage) 500 mg tablet Take 1 tablet (500 mg) by mouth 2 times a day. 180 tablet 3   • metoprolol tartrate (Lopressor) 50 mg tablet Take 1 tablet by mouth 2 times a day. 180 tablet 3   • multivitamin with minerals tablet Take 1 tablet by mouth once daily.     • pantoprazole (ProtoNix) 40 mg EC tablet Take 1 tablet (40 mg) by mouth once daily. 90 tablet 3   • semaglutide (Ozempic) 0.25 mg or 0.5 mg (2 mg/3 mL) pen injector INJECT 0.5MG UNDER THE SKIN ONCE A WEEK AS DIRECTED 3 mL 2   • [DISCONTINUED] losartan (Cozaar) 25 mg tablet Take 1 tablet (25 mg) by mouth once daily. 90 tablet 3     No facility-administered encounter medications on file as of 7/16/2025.           [1]  Past Medical History:  Diagnosis Date   • Arrhythmia    • Bence Huang proteinuria 07/27/2013    Bence Huang proteinuria   • Coronary artery disease involving native coronary artery of native heart without angina pectoris 04/02/2024    CAC 2101 (2017)   • Dorsalgia, unspecified 09/27/2016    Back pain, acute   • Encounter for immunization 06/29/2018    Need for shingles vaccine   • Fatigue 04/10/2023   • GERD (gastroesophageal reflux disease)    • History of recent steroid use    • Hyperlipidemia    • Hypertension    • Localized edema 07/07/2022    Edema of extremities   • Moderate aortic stenosis 04/02/2024   • PAF (paroxysmal atrial fibrillation) (Multi) 04/02/2024   • Pain in left knee 12/08/2021    Acute pain of left knee   • Pain in unspecified knee 04/28/2020    Joint pain, knee   • Personal history of other diseases of the circulatory system     Personal history of coronary atherosclerosis   • Personal history of other diseases of urinary system 07/07/2022    History of renal insufficiency syndrome   • Personal history of other infectious and parasitic diseases 05/14/2018    History of herpes labialis   • Severe aortic stenosis 04/02/2024   • Severe obesity (Multi) 04/16/2024   • Sleep apnea     no CPAP being used   • Sprain of other specified parts of left knee, initial encounter 01/02/2020    Injury of meniscus of left knee, initial encounter   • Type 2 diabetes mellitus    • Unilateral primary osteoarthritis, left knee 08/20/2019    Arthritis of knee, left   • Visual disturbance 08/31/2024   [2]  Past Surgical History:  Procedure Laterality Date   • CARDIAC CATHETERIZATION N/A 5/9/2025    Procedure: LHC, With LV;  Surgeon: Loy Soni MD;  Location: [facility] Cardiac [facility];  Service: Cardiovascular;  Laterality: N/A;   • CHOLECYSTECTOMY  01/28/2014    Cholecystectomy   • COLONOSCOPY  01/28/2014    Complete Colonoscopy   • ESOPHAGOGASTRODUODENOSCOPY  01/28/2014    Diagnostic  Esophagogastroduodenoscopy   [3]  Family History  Problem Relation Name Age of Onset   • Hypertension Mother     • Other (abdominal aortic aneurysm) Father     • Hyperlipidemia Father     • Hypertension Father     [4]  Allergies  Allergen Reactions   • Cerivastatin Other     Back/neck stiffness   • Jardiance [Empagliflozin] Unknown   • Mounjaro [Tirzepatide] GI Upset   • Nifedipine Other     Urinary urgency   • Statins-Hmg-Coa Reductase Inhibitors Other     Joint pain/stiffness   • Icosapent Ethyl Palpitations   [5]  Outpatient Encounter Medications as of 7/16/2025   Medication Sig Dispense Refill   • acetaminophen (Tylenol) 325 mg tablet Take 2 tablets (650 mg) by mouth every 6 hours if needed for mild pain (1 - 3).     • amLODIPine (Norvasc) 5 mg tablet Take 1 tablet (5 mg) by mouth once daily. 30 tablet 0   • aspirin 81 mg EC tablet Take 1 tablet (81 mg) by mouth once daily. Do not fill before May 10, 2025. (Patient taking differently: Take 1 tablet (81 mg) by mouth once daily. Takes one tablet twice a day) 90 tablet 3   • atorvastatin (Lipitor) 80 mg tablet Take 1 tablet (80 mg) by mouth once daily. 90 tablet 3   • Blood glucose monitoring meter kit kit 1 each if needed.     • blood sugar diagnostic (True Metrix Glucose Test Strip) strip 100 strips once daily. 100 strip 3   • cholecalciferol (Vitamin D-3) 50 mcg (2,000 unit) capsule Take 1 capsule (50 mcg) by mouth once daily.     • Eliquis 5 mg tablet Take 1 tablet (5 mg) by mouth 2 times a day. 180 tablet 3   • FreeStyle Jaun 3 Hodgen misc Use as instructed 1 each 0   • FreeStyle Jaun 3 Sensor device Change sensor every 14 days as directed. Rotate sites. 2 each 3   • lancets misc 100 Lancets once daily. 100 each 3   • losartan (Cozaar) 50 mg tablet Take 1 tablet (50 mg) by mouth once daily. 90 tablet 3   • metFORMIN (Glucophage) 500 mg tablet Take 1 tablet (500 mg) by mouth 2 times a day. 180 tablet 3   • metoprolol tartrate (Lopressor) 50 mg tablet Take 1  tablet by mouth 2 times a day. 180 tablet 3   • multivitamin with minerals tablet Take 1 tablet by mouth once daily.     • pantoprazole (ProtoNix) 40 mg EC tablet Take 1 tablet (40 mg) by mouth once daily. 90 tablet 3   • semaglutide (Ozempic) 0.25 mg or 0.5 mg (2 mg/3 mL) pen injector INJECT 0.5MG UNDER THE SKIN ONCE A WEEK AS DIRECTED 3 mL 2   • [DISCONTINUED] losartan (Cozaar) 25 mg tablet Take 1 tablet (25 mg) by mouth once daily. 90 tablet 3     No facility-administered encounter medications on file as of 7/16/2025.

## 2025-07-22 ENCOUNTER — HOME CARE VISIT (OUTPATIENT)
Dept: HOME HEALTH SERVICES | Facility: HOME HEALTH | Age: 75
End: 2025-07-22
Payer: COMMERCIAL

## 2025-07-22 ASSESSMENT — ACTIVITIES OF DAILY LIVING (ADL)
BATHING ASSESSED: 1
TOILETING: INDEPENDENT
GROOMING_CURRENT_FUNCTION: INDEPENDENT
AMBULATION ASSISTANCE: INDEPENDENT
AMBULATION ASSISTANCE: 1
GROOMING ASSESSED: 1
BATHING_CURRENT_FUNCTION: INDEPENDENT
DRESSING_LB_CURRENT_FUNCTION: INDEPENDENT
TOILETING: 1
OASIS_M1830: 02
FEEDING: INDEPENDENT
FEEDING ASSESSED: 1
DRESSING_UB_CURRENT_FUNCTION: INDEPENDENT

## 2025-07-23 ASSESSMENT — ACTIVITIES OF DAILY LIVING (ADL): HOME_HEALTH_OASIS: 01

## 2025-08-13 ENCOUNTER — TELEPHONE (OUTPATIENT)
Dept: CARDIAC SURGERY | Facility: CLINIC | Age: 75
End: 2025-08-13
Payer: COMMERCIAL

## 2025-08-13 ENCOUNTER — TELEPHONE (OUTPATIENT)
Dept: SURGERY | Facility: CLINIC | Age: 75
End: 2025-08-13
Payer: COMMERCIAL

## 2025-08-21 ENCOUNTER — ANCILLARY PROCEDURE (OUTPATIENT)
Dept: CARDIOLOGY | Facility: CLINIC | Age: 75
End: 2025-08-21
Payer: COMMERCIAL

## 2025-08-21 ENCOUNTER — APPOINTMENT (OUTPATIENT)
Dept: CARDIOLOGY | Facility: CLINIC | Age: 75
End: 2025-08-21
Payer: COMMERCIAL

## 2025-08-21 LAB
ATRIAL RATE: 77 BPM
P AXIS: 36 DEGREES
P OFFSET: 191 MS
P ONSET: 144 MS
PR INTERVAL: 160 MS
Q ONSET: 224 MS
QRS COUNT: 13 BEATS
QRS DURATION: 86 MS
QT INTERVAL: 386 MS
QTC CALCULATION(BAZETT): 436 MS
QTC FREDERICIA: 419 MS
R AXIS: 29 DEGREES
T AXIS: 126 DEGREES
T OFFSET: 417 MS
VENTRICULAR RATE: 77 BPM

## 2025-08-21 PROCEDURE — 93010 ELECTROCARDIOGRAM REPORT: CPT | Performed by: INTERNAL MEDICINE

## 2025-08-21 PROCEDURE — 93005 ELECTROCARDIOGRAM TRACING: CPT

## 2025-08-28 ENCOUNTER — APPOINTMENT (OUTPATIENT)
Dept: PHARMACY | Facility: HOSPITAL | Age: 75
End: 2025-08-28
Payer: COMMERCIAL

## 2025-08-28 DIAGNOSIS — I25.10 ATHEROSCLEROTIC CARDIOVASCULAR DISEASE: ICD-10-CM

## 2025-08-28 DIAGNOSIS — E11.9 TYPE 2 DIABETES MELLITUS WITHOUT COMPLICATION, WITHOUT LONG-TERM CURRENT USE OF INSULIN: ICD-10-CM

## 2025-09-02 ENCOUNTER — TELEMEDICINE (OUTPATIENT)
Dept: CARDIAC SURGERY | Facility: CLINIC | Age: 75
End: 2025-09-02
Payer: COMMERCIAL

## 2025-09-02 DIAGNOSIS — I48.0 PAF (PAROXYSMAL ATRIAL FIBRILLATION) (MULTI): Primary | ICD-10-CM

## 2025-09-02 PROCEDURE — 4010F ACE/ARB THERAPY RXD/TAKEN: CPT

## 2025-09-02 PROCEDURE — 3051F HG A1C>EQUAL 7.0%<8.0%: CPT

## 2025-09-02 PROCEDURE — 1036F TOBACCO NON-USER: CPT

## 2025-09-02 PROCEDURE — 1159F MED LIST DOCD IN RCRD: CPT

## 2025-09-02 PROCEDURE — 99214 OFFICE O/P EST MOD 30 MIN: CPT

## 2025-09-02 ASSESSMENT — ENCOUNTER SYMPTOMS
OCCASIONAL FEELINGS OF UNSTEADINESS: 0
LOSS OF SENSATION IN FEET: 0
DEPRESSION: 0

## 2025-09-02 ASSESSMENT — LIFESTYLE VARIABLES
HOW OFTEN DO YOU HAVE A DRINK CONTAINING ALCOHOL: NEVER
HOW OFTEN DO YOU HAVE SIX OR MORE DRINKS ON ONE OCCASION: NEVER
SKIP TO QUESTIONS 9-10: 1
HOW MANY STANDARD DRINKS CONTAINING ALCOHOL DO YOU HAVE ON A TYPICAL DAY: PATIENT DOES NOT DRINK
AUDIT-C TOTAL SCORE: 0

## 2025-09-23 ENCOUNTER — APPOINTMENT (OUTPATIENT)
Dept: PHARMACY | Facility: HOSPITAL | Age: 75
End: 2025-09-23
Payer: COMMERCIAL

## 2025-10-16 ENCOUNTER — APPOINTMENT (OUTPATIENT)
Dept: PRIMARY CARE | Facility: CLINIC | Age: 75
End: 2025-10-16
Payer: COMMERCIAL

## (undated) DEVICE — CANNULA, EOPA 20F W/O GUIDEWIRE

## (undated) DEVICE — COVER, CART, 45 X 27 X 48 IN, CLEAR

## (undated) DEVICE — PLEDGET, PTFE, SOFT, LARGE, 3/8 X 3/16 X 1/16 IN

## (undated) DEVICE — DRESSING, ADHESIVE, ISLAND, TELFA, 2 X 3.75 IN, LF

## (undated) DEVICE — MONITORING KIT, TRANSDUCER, RETROGRADE, MPS, W/EXTENSION, LF

## (undated) DEVICE — CATHETER, THORACIC, STRAIGHT, ADULT, 28 FR, PVC

## (undated) DEVICE — DRESSING, ADHESIVE, ISLAND, TELFA, 4 X 14 IN

## (undated) DEVICE — SUTURE, MONOCRYL, 3-0, 18 IN, PS2, UNDYED

## (undated) DEVICE — INSERT, CLAMP, SURGICAL, SOFT/TRACTION, STEALTH, 5 MM

## (undated) DEVICE — BAG, DECANTER

## (undated) DEVICE — Device

## (undated) DEVICE — DEVICE, ISO SYNERGY ENCOMPASS, LONG

## (undated) DEVICE — GUIDEWIRE, GO2WIRE, STEERABLE, 0.035 X 260CM, FLOPPY STRAIGHT

## (undated) DEVICE — KIT, CELL SAVER, W/COLLECTION SET, 225ML WASH SET

## (undated) DEVICE — CLIP, LIGATING, W/ADHESIVE, WIDE SLOT, SMALL, TITANIUM

## (undated) DEVICE — CATHETER, DIAGNOSTIC, 4 FR-JR 4

## (undated) DEVICE — BLADE, ZDRIVE, STERNALOCK XP RIGID FIXATION

## (undated) DEVICE — GEL, ULTRASOUND, AQUASONIC 100, 20 GM, STERILE

## (undated) DEVICE — SUTURE, SILK, 2-0, 30 IN, SH, CONTROL RELEASE, MULTIPACK, BLACK

## (undated) DEVICE — DRIVER, HI-TORQUE, Z-DRIVER

## (undated) DEVICE — SUTURE, PROLENE, 6-0, 30 IN, C-1, CV-11, BLUE

## (undated) DEVICE — SYRINGE, VASOSHIELD, PRESSURE CONTROLLING

## (undated) DEVICE — DRAPE, SHEET, CARDIOVASCULAR, ANTIMICROBIAL, W/ANESTHESIA SCREEN, IOBAN 2, STERI DRAPE, 107 X 133 IN, DISPOSABLE, FABRIC, BLUE, STERILE

## (undated) DEVICE — TIP, SUCTION, YANKAUER, FLEXIBLE

## (undated) DEVICE — CATHETER, DRAINAGE, NASOGASTRIC, DOUBLE LUMEN, FUNNEL END, SUMP, SALEM, 18 FR, 48 IN, PVC, STERILE

## (undated) DEVICE — TUBING PACK, OXYGENATOR, ADULT

## (undated) DEVICE — TR BAND, RADIAL COMPRESSION, STANDARD, 24CM

## (undated) DEVICE — SUTURE, PROLENE, 7-0, 30 IN, BV1, DA, BLUE

## (undated) DEVICE — CLEANER, ELECTROSURGICAL, TIP, 5 X 5 CM, LF

## (undated) DEVICE — SUTURE, SILK, 4-0, 18 IN, LABYRINTH, BLACK

## (undated) DEVICE — SUTURE, ETHIBOND, EXCEL, 2-0, 30 IN, V-5, GREEN/WHITE

## (undated) DEVICE — CANNULA, CARDIAC SUMP

## (undated) DEVICE — CANNULA, VESSEL, FREE FLOW, BLUNT TIP, 3 MM X 5 CM

## (undated) DEVICE — KIT, COR-KNOT MINI COMBO

## (undated) DEVICE — SUTURE, PROLENE, 3-0, 36 IN, SH, DA, BLUE

## (undated) DEVICE — SUTURE, VICRYL 0, TAPER POINT, CT-1 VIOLET 27 INCH

## (undated) DEVICE — GUIDEWIRE, INQWIRE, 3MM J, .035 X 210CM, FIXED

## (undated) DEVICE — TUBE SET, PNEUMOCLEAR, SMOKE EVACU, HIGH-FLOW

## (undated) DEVICE — CONNECTOR, STRAIGHT, 0.5 X 0.5 IN

## (undated) DEVICE — TIP, SUCTION, YANKAUER, W/O VENT, FLEXIBLE, OPEN TIP, HIGH CAPACITY

## (undated) DEVICE — BONE WAX, 3.5G ABSORBABLE, OSTENE

## (undated) DEVICE — SENSOR, O3 REGIONAL, LARGE

## (undated) DEVICE — CANNULA, AORTIC ROOT, 12G

## (undated) DEVICE — SUTURE, PROLENE, 4-0, TAPER POINT, SH/SH BLUE 36IN

## (undated) DEVICE — TOWEL, OR XRAY, RFID DETECT, WHITE, 17X26, STERILE

## (undated) DEVICE — COUNTER, NEEDLE, FOAM BLOCK, POP-N-COUNT, W/BLADEGUARD, W/ADHESIVE 40 COUNT, RED

## (undated) DEVICE — MARKER, SKIN, DUAL TIP INK W/9 LABEL AND REMOVABLE TIME OUT SLEEVE

## (undated) DEVICE — TRAY, SURESTEP, URINE METER, 14FR, SILICONE

## (undated) DEVICE — SYRINGE, 20 CC, LUER LOCK, MONOJECT, W/O CAP, LF

## (undated) DEVICE — TUBING, SUCTION, CARDIAC, 6 FR

## (undated) DEVICE — MANIFOLD, 4 PORT NEPTUNE STANDARD

## (undated) DEVICE — OSTIAL, BASKET-TIP, 12 FR

## (undated) DEVICE — SUCKER LINE, EXTRA, RED VENT

## (undated) DEVICE — DRESSING, MEPILEX, BORDER, SACRUM, 8.7 X 9.8 IN

## (undated) DEVICE — MICROCOAGULATION TEST, ACT+ TEST CUVETTE

## (undated) DEVICE — KIT, TOURNIQUET, 7"

## (undated) DEVICE — ADAPTER, Y, CORONARY PERFUSION

## (undated) DEVICE — CARDIOPLEGIA SET

## (undated) DEVICE — APPLICATOR, CHLORAPREP, W/ORANGE TINT, 26ML

## (undated) DEVICE — SPONGE, HEMOSTAT, SURGICEL FIBRILLAR, ABS, 4 X 4, LF

## (undated) DEVICE — CLIP, LIGATING, HORIZON, LARGE, TITANIUM

## (undated) DEVICE — CLIP, LIGATING, W/ADHESIVE PAD, MEDIUM, TITANIUM

## (undated) DEVICE — TUBING PACK, FX ADULT, WITH ICP CENTRIFUGA

## (undated) DEVICE — CLIPPER, SURGICAL BLADE ASSEMBLY, GENERAL PURPOSE, SINGLE USE

## (undated) DEVICE — SUTURE, PROLENE, 5-0, 36 IN, C1, DA, BLUE

## (undated) DEVICE — CLOSURE SYSTEM, DERMABOND, PRINEO, 22CM, STERILE

## (undated) DEVICE — SINGLES, QLU, COR-KNOT

## (undated) DEVICE — CANNULA. CORONARY OSTIA, 20FR

## (undated) DEVICE — BANDAGE, ELASTIC, ACE, ACE, DOUBLE LENGTH, 6 X 550 IN, LF

## (undated) DEVICE — SUTURE, ETHIBOND, XTRA, 30 IN, 0, CT-1, GREEN

## (undated) DEVICE — COLLECTION UNIT, DRAINAGE, THORACIC, SINGLE TUBE, DRY SUCTION, ATS COMPATIBLE, OASIS 3600, LF

## (undated) DEVICE — BLADE, SAW STERNUM, STERILE

## (undated) DEVICE — SYRINGE, LUER LOCK, 12ML

## (undated) DEVICE — SHUNT, SENSOR

## (undated) DEVICE — DRAPE, FLUID WARMER

## (undated) DEVICE — OXYGENATOR FX 25, W/HR, ARTERIAL FILTER

## (undated) DEVICE — GOWN, ASTOUND, L

## (undated) DEVICE — SUTURE, SILK, 1, 30 IN, LABYRINTH, BLACK

## (undated) DEVICE — CANNULA, VENOUS, 2-STAGE, 32/40 ROUND

## (undated) DEVICE — CATHETER, DIAGNOSTIC, 4 FR-JL 3.5

## (undated) DEVICE — PUNCH, AORTIC 4MM

## (undated) DEVICE — DRAIN, CHANNEL, BLAKE, HUBLESS, ROUND, 28FR

## (undated) DEVICE — SUTURE, VICRYL, 4-0, 27 IN, KS, UNDYED

## (undated) DEVICE — GLIDESHEATH, SLENDER 6FR, 10CM, NITINOL KIT

## (undated) DEVICE — PACING CABLE, EXTENSION, 12 FT BEIGE, DISPOSABLE

## (undated) DEVICE — ELECTRODE, QUICK-COMBO, REDI PACK

## (undated) DEVICE — SUTURE, PROLENE, 4-0, 36 IN, BB, BLUE

## (undated) DEVICE — KIT, BLOWER / MISTER II

## (undated) DEVICE — CLIP, SPRING, BULLDOG, FOGARTY, SOFT JAW, 6 MM, LF

## (undated) DEVICE — CASSETTE, BLOOD, PLEGIC SET

## (undated) DEVICE — SUTURE, ETHIBOND, XTRA, 30 IN, 0, CTX, GREEN

## (undated) DEVICE — SUTURE, VICRYL, 2-0, 27 IN, CT-1, VIOLET

## (undated) DEVICE — DRESSING, ISLAND, TELFA, 4 X 5 IN

## (undated) DEVICE — DRESSING, MEPILEX, BORDER, HEEL, 8.7 X 9.1 IN